# Patient Record
Sex: MALE | Race: WHITE | NOT HISPANIC OR LATINO | URBAN - METROPOLITAN AREA
[De-identification: names, ages, dates, MRNs, and addresses within clinical notes are randomized per-mention and may not be internally consistent; named-entity substitution may affect disease eponyms.]

---

## 2018-05-09 ENCOUNTER — OUTPATIENT (OUTPATIENT)
Dept: OUTPATIENT SERVICES | Facility: HOSPITAL | Age: 68
LOS: 1 days | End: 2018-05-09

## 2020-07-15 PROBLEM — Z00.00 ENCOUNTER FOR PREVENTIVE HEALTH EXAMINATION: Status: ACTIVE | Noted: 2020-07-15

## 2020-07-17 ENCOUNTER — APPOINTMENT (OUTPATIENT)
Dept: UROLOGY | Facility: CLINIC | Age: 70
End: 2020-07-17
Payer: MEDICARE

## 2020-07-17 VITALS
WEIGHT: 146 LBS | HEIGHT: 73 IN | TEMPERATURE: 98 F | DIASTOLIC BLOOD PRESSURE: 80 MMHG | HEART RATE: 90 BPM | RESPIRATION RATE: 17 BRPM | BODY MASS INDEX: 19.35 KG/M2 | SYSTOLIC BLOOD PRESSURE: 153 MMHG

## 2020-07-17 DIAGNOSIS — F17.200 NICOTINE DEPENDENCE, UNSPECIFIED, UNCOMPLICATED: ICD-10-CM

## 2020-07-17 LAB
BILIRUB UR QL STRIP: NORMAL
CLARITY UR: CLEAR
COLLECTION METHOD: NORMAL
GLUCOSE UR-MCNC: NORMAL
HCG UR QL: 0.2 EU/DL
HGB UR QL STRIP.AUTO: NORMAL
KETONES UR-MCNC: NORMAL
LEUKOCYTE ESTERASE UR QL STRIP: ABNORMAL
NITRITE UR QL STRIP: NORMAL
PH UR STRIP: 5
PROT UR STRIP-MCNC: NORMAL
SP GR UR STRIP: 1.02

## 2020-07-17 PROCEDURE — 81003 URINALYSIS AUTO W/O SCOPE: CPT | Mod: QW

## 2020-07-17 PROCEDURE — 99204 OFFICE O/P NEW MOD 45 MIN: CPT | Mod: 25

## 2020-07-17 PROCEDURE — 51798 US URINE CAPACITY MEASURE: CPT

## 2020-07-17 NOTE — HISTORY OF PRESENT ILLNESS
[FreeTextEntry1] : Mr. WILLARD ROSAS 70 year old  M  no PMH and no PSH. pt comes in bc of urinary freq. Urinating small amounts. After urination he feels he doesn't totally empty. This has been going on for at least a year. Stream is weak. Nocturia 2-3. Has urinary urgency. Some hesitancy. Occasional intermittency. 7/7/20 PSA 4.3. Pt has noticed some unexplained weight loss.  Denies FM of cancer. Current smoker.\par \par PVR 0\par

## 2020-07-17 NOTE — PHYSICAL EXAM
[General Appearance - Well Developed] : well developed [General Appearance - Well Nourished] : well nourished [Normal Appearance] : normal appearance [Well Groomed] : well groomed [General Appearance - In No Acute Distress] : no acute distress [Edema] : no peripheral edema [Exaggerated Use Of Accessory Muscles For Inspiration] : no accessory muscle use [Respiration, Rhythm And Depth] : normal respiratory rhythm and effort [Abdomen Soft] : soft [Abdomen Tenderness] : non-tender [Penis Abnormality] : normal circumcised penis [Urethral Meatus] : meatus normal [Costovertebral Angle Tenderness] : no ~M costovertebral angle tenderness [Scrotum] : the scrotum was normal [Testes Mass (___cm)] : there were no testicular masses [Urinary Bladder Findings] : the bladder was normal on palpation [Prostate Tenderness] : the prostate was not tender [Prostate Size ___ gm] : prostate size [unfilled] gm [No Prostate Nodules] : no prostate nodules [Normal Station and Gait] : the gait and station were normal for the patient's age [No Focal Deficits] : no focal deficits [] : no rash [Affect] : the affect was normal [Mood] : the mood was normal [Oriented To Time, Place, And Person] : oriented to person, place, and time [Not Anxious] : not anxious

## 2020-07-17 NOTE — REVIEW OF SYSTEMS
[Feeling Poorly] : feeling poorly [Feeling Tired] : feeling tired [Eyesight Problems] : eyesight problems [Pain during urination] : pain during urination [Constipation] : constipation [Urine Infection (bladder/kidney)] : bladder/kidney infection [Slow urine stream] : slow urine stream [Interrupted urine stream] : interrupted urine stream [Wake up at night to urinate  How many times?  ___] : wakes up to urinate [unfilled] times during the night [Negative] : Heme/Lymph [see HPI] : see HPI

## 2020-07-20 LAB
APPEARANCE: CLEAR
BACTERIA UR CULT: NORMAL
BACTERIA: NEGATIVE
BILIRUBIN URINE: NEGATIVE
BLOOD URINE: NEGATIVE
COLOR: YELLOW
GLUCOSE QUALITATIVE U: NEGATIVE
HYALINE CASTS: 0 /LPF
KETONES URINE: NEGATIVE
LEUKOCYTE ESTERASE URINE: NEGATIVE
MICROSCOPIC-UA: NORMAL
NITRITE URINE: NEGATIVE
PH URINE: 6
PROTEIN URINE: NORMAL
RED BLOOD CELLS URINE: 4 /HPF
SPECIFIC GRAVITY URINE: 1.03
SQUAMOUS EPITHELIAL CELLS: 0 /HPF
UROBILINOGEN URINE: NORMAL
WHITE BLOOD CELLS URINE: 3 /HPF

## 2020-08-14 ENCOUNTER — APPOINTMENT (OUTPATIENT)
Dept: UROLOGY | Facility: CLINIC | Age: 70
End: 2020-08-14
Payer: MEDICARE

## 2020-08-14 VITALS
HEIGHT: 73 IN | WEIGHT: 146 LBS | SYSTOLIC BLOOD PRESSURE: 131 MMHG | HEART RATE: 98 BPM | TEMPERATURE: 97.3 F | DIASTOLIC BLOOD PRESSURE: 69 MMHG | BODY MASS INDEX: 19.35 KG/M2

## 2020-08-14 PROCEDURE — 99214 OFFICE O/P EST MOD 30 MIN: CPT

## 2020-08-14 NOTE — ASSESSMENT
[FreeTextEntry1] : Plan\par prostate biopsy with Dr. Urias\par BMP\par CT urogram\par cystoscopy\par c/w flomax

## 2020-08-14 NOTE — PHYSICAL EXAM
[General Appearance - Well Developed] : well developed [General Appearance - Well Nourished] : well nourished [Normal Appearance] : normal appearance [Well Groomed] : well groomed [General Appearance - In No Acute Distress] : no acute distress [Abdomen Tenderness] : non-tender [Abdomen Soft] : soft [Costovertebral Angle Tenderness] : no ~M costovertebral angle tenderness [Urinary Bladder Findings] : the bladder was normal on palpation [Edema] : no peripheral edema [] : no respiratory distress [Respiration, Rhythm And Depth] : normal respiratory rhythm and effort [Affect] : the affect was normal [Mood] : the mood was normal [Exaggerated Use Of Accessory Muscles For Inspiration] : no accessory muscle use [Oriented To Time, Place, And Person] : oriented to person, place, and time [Not Anxious] : not anxious [Normal Station and Gait] : the gait and station were normal for the patient's age [No Focal Deficits] : no focal deficits

## 2020-08-19 ENCOUNTER — NON-APPOINTMENT (OUTPATIENT)
Age: 70
End: 2020-08-19

## 2020-08-24 ENCOUNTER — APPOINTMENT (OUTPATIENT)
Dept: CT IMAGING | Facility: CLINIC | Age: 70
End: 2020-08-24
Payer: MEDICARE

## 2020-08-24 ENCOUNTER — OUTPATIENT (OUTPATIENT)
Dept: OUTPATIENT SERVICES | Facility: HOSPITAL | Age: 70
LOS: 1 days | End: 2020-08-24
Payer: MEDICARE

## 2020-08-24 ENCOUNTER — RESULT REVIEW (OUTPATIENT)
Age: 70
End: 2020-08-24

## 2020-08-24 DIAGNOSIS — R31.29 OTHER MICROSCOPIC HEMATURIA: ICD-10-CM

## 2020-08-24 PROCEDURE — 74178 CT ABD&PLV WO CNTR FLWD CNTR: CPT

## 2020-08-24 PROCEDURE — 74178 CT ABD&PLV WO CNTR FLWD CNTR: CPT | Mod: 26

## 2020-08-26 ENCOUNTER — APPOINTMENT (OUTPATIENT)
Dept: UROLOGY | Facility: CLINIC | Age: 70
End: 2020-08-26

## 2020-09-03 ENCOUNTER — RX RENEWAL (OUTPATIENT)
Age: 70
End: 2020-09-03

## 2020-09-04 LAB
ANION GAP SERPL CALC-SCNC: 14 MMOL/L
BUN SERPL-MCNC: 22 MG/DL
CALCIUM SERPL-MCNC: 9.5 MG/DL
CHLORIDE SERPL-SCNC: 103 MMOL/L
CO2 SERPL-SCNC: 24 MMOL/L
CREAT SERPL-MCNC: 1.03 MG/DL
GLUCOSE SERPL-MCNC: 97 MG/DL
POTASSIUM SERPL-SCNC: 4.5 MMOL/L
SODIUM SERPL-SCNC: 141 MMOL/L

## 2020-09-15 ENCOUNTER — TRANSCRIPTION ENCOUNTER (OUTPATIENT)
Age: 70
End: 2020-09-15

## 2020-09-16 ENCOUNTER — APPOINTMENT (OUTPATIENT)
Dept: UROLOGY | Facility: CLINIC | Age: 70
End: 2020-09-16
Payer: MEDICARE

## 2020-09-16 VITALS
BODY MASS INDEX: 19.88 KG/M2 | DIASTOLIC BLOOD PRESSURE: 81 MMHG | HEIGHT: 73 IN | TEMPERATURE: 96.7 F | SYSTOLIC BLOOD PRESSURE: 150 MMHG | WEIGHT: 150 LBS | HEART RATE: 101 BPM

## 2020-09-16 LAB
4K SCORE CALCULATION: 6 %
FREE PSA: 0.79 NG/ML
PERCENT FREE PSA: 23 %
TOTAL PSA: 3.49 NG/ML

## 2020-09-16 PROCEDURE — 99214 OFFICE O/P EST MOD 30 MIN: CPT

## 2020-09-16 NOTE — HISTORY OF PRESENT ILLNESS
[FreeTextEntry1] : Pt comes in dollow up elevated PSA. 4K blood test on 9/3/20 shows a PSA of 3.49 and a 4K score calculation 6%. Pt currently on flomax and is doing much better with his urination. He is currently happy with his urination. CT shows a left anterior upper pole renal cyst, left parapelvic cyst. Infiltration of the right perinephric fat unchanged from prior exam. Bladder shows mild circumferential thickening of the bladder wall with more prominent focal thickening along the posterior left bladder wall. Enlarged prostate with focal cystic changes anteriorly. Prostate protrudes into the base of the bladder

## 2020-09-16 NOTE — PHYSICAL EXAM
[General Appearance - Well Developed] : well developed [Normal Appearance] : normal appearance [General Appearance - Well Nourished] : well nourished [Well Groomed] : well groomed [General Appearance - In No Acute Distress] : no acute distress [Abdomen Soft] : soft [Costovertebral Angle Tenderness] : no ~M costovertebral angle tenderness [Abdomen Tenderness] : non-tender [Urinary Bladder Findings] : the bladder was normal on palpation [Edema] : no peripheral edema [] : no respiratory distress [Respiration, Rhythm And Depth] : normal respiratory rhythm and effort [Exaggerated Use Of Accessory Muscles For Inspiration] : no accessory muscle use [Oriented To Time, Place, And Person] : oriented to person, place, and time [Mood] : the mood was normal [Affect] : the affect was normal [No Focal Deficits] : no focal deficits [Normal Station and Gait] : the gait and station were normal for the patient's age [Not Anxious] : not anxious

## 2020-10-08 ENCOUNTER — APPOINTMENT (OUTPATIENT)
Dept: UROLOGY | Facility: CLINIC | Age: 70
End: 2020-10-08
Payer: MEDICARE

## 2020-10-08 VITALS
WEIGHT: 150 LBS | BODY MASS INDEX: 19.88 KG/M2 | DIASTOLIC BLOOD PRESSURE: 93 MMHG | SYSTOLIC BLOOD PRESSURE: 165 MMHG | HEIGHT: 73 IN | HEART RATE: 90 BPM | TEMPERATURE: 97.8 F

## 2020-10-08 LAB
BILIRUB UR QL STRIP: NEGATIVE
CLARITY UR: CLEAR
COLLECTION METHOD: NORMAL
GLUCOSE UR-MCNC: NEGATIVE
HCG UR QL: 1 EU/DL
HGB UR QL STRIP.AUTO: NEGATIVE
KETONES UR-MCNC: NEGATIVE
LEUKOCYTE ESTERASE UR QL STRIP: NEGATIVE
NITRITE UR QL STRIP: NEGATIVE
PH UR STRIP: 7.5
PROT UR STRIP-MCNC: NEGATIVE
SP GR UR STRIP: 1.02

## 2020-10-08 PROCEDURE — 81003 URINALYSIS AUTO W/O SCOPE: CPT | Mod: QW

## 2020-10-08 PROCEDURE — 52000 CYSTOURETHROSCOPY: CPT

## 2021-01-07 ENCOUNTER — APPOINTMENT (OUTPATIENT)
Dept: UROLOGY | Facility: CLINIC | Age: 71
End: 2021-01-07
Payer: MEDICARE

## 2021-01-07 VITALS
DIASTOLIC BLOOD PRESSURE: 82 MMHG | HEART RATE: 96 BPM | SYSTOLIC BLOOD PRESSURE: 158 MMHG | BODY MASS INDEX: 19.88 KG/M2 | TEMPERATURE: 97.6 F | WEIGHT: 150 LBS | HEIGHT: 73 IN

## 2021-01-07 PROCEDURE — 99072 ADDL SUPL MATRL&STAF TM PHE: CPT

## 2021-01-07 PROCEDURE — 99214 OFFICE O/P EST MOD 30 MIN: CPT

## 2021-01-07 NOTE — ASSESSMENT
[FreeTextEntry1] : Plan\par PSA in April\par fu 3 months\par c/w finasteride and flomax\par urine cytology

## 2021-01-08 LAB — URINE CYTOLOGY: NORMAL

## 2021-04-08 ENCOUNTER — APPOINTMENT (OUTPATIENT)
Dept: UROLOGY | Facility: CLINIC | Age: 71
End: 2021-04-08
Payer: MEDICARE

## 2021-04-08 VITALS
TEMPERATURE: 97.8 F | BODY MASS INDEX: 19.88 KG/M2 | SYSTOLIC BLOOD PRESSURE: 138 MMHG | HEART RATE: 103 BPM | DIASTOLIC BLOOD PRESSURE: 72 MMHG | HEIGHT: 73 IN | WEIGHT: 150 LBS

## 2021-04-08 PROCEDURE — 99072 ADDL SUPL MATRL&STAF TM PHE: CPT

## 2021-04-08 PROCEDURE — 99213 OFFICE O/P EST LOW 20 MIN: CPT

## 2021-04-08 NOTE — HISTORY OF PRESENT ILLNESS
[FreeTextEntry1] : Pt comes in follow up hematuria and elevated PSA. Pt did not get PSA done yet. Pt denies any urinary complaints or hematuria.

## 2021-04-15 ENCOUNTER — APPOINTMENT (OUTPATIENT)
Dept: UROLOGY | Facility: CLINIC | Age: 71
End: 2021-04-15
Payer: MEDICARE

## 2021-04-15 VITALS
BODY MASS INDEX: 19.88 KG/M2 | DIASTOLIC BLOOD PRESSURE: 83 MMHG | HEIGHT: 73 IN | HEART RATE: 101 BPM | SYSTOLIC BLOOD PRESSURE: 145 MMHG | TEMPERATURE: 97.3 F | WEIGHT: 150 LBS

## 2021-04-15 PROCEDURE — 99072 ADDL SUPL MATRL&STAF TM PHE: CPT

## 2021-04-15 PROCEDURE — 99214 OFFICE O/P EST MOD 30 MIN: CPT

## 2021-04-15 NOTE — HISTORY OF PRESENT ILLNESS
[FreeTextEntry1] : Pt comes in follow up elevated PSA. Pt is currently on flomax and finasteride. \par \par 8/7/20 PSA 4.6 % free 24\par 9/16/20 PSA 3.49\par 4/12/21 PSA 1.7 on finasteride

## 2021-09-27 ENCOUNTER — RX RENEWAL (OUTPATIENT)
Age: 71
End: 2021-09-27

## 2021-10-14 ENCOUNTER — APPOINTMENT (OUTPATIENT)
Dept: UROLOGY | Facility: CLINIC | Age: 71
End: 2021-10-14
Payer: MEDICARE

## 2021-10-14 VITALS
WEIGHT: 150 LBS | HEART RATE: 108 BPM | HEIGHT: 73 IN | SYSTOLIC BLOOD PRESSURE: 126 MMHG | TEMPERATURE: 97.9 F | BODY MASS INDEX: 19.88 KG/M2 | DIASTOLIC BLOOD PRESSURE: 81 MMHG

## 2021-10-14 PROCEDURE — 99214 OFFICE O/P EST MOD 30 MIN: CPT

## 2021-10-14 RX ORDER — FINASTERIDE 5 MG/1
5 TABLET, FILM COATED ORAL
Qty: 90 | Refills: 3 | Status: ACTIVE | COMMUNITY
Start: 2020-10-08 | End: 1900-01-01

## 2021-10-14 NOTE — HISTORY OF PRESENT ILLNESS
[FreeTextEntry1] : Pt comes in follow up elevated PSA. Pt is currently on flomax and finasteride. \par \par 8/7/20 PSA 4.6 % free 24\par 9/16/20 PSA 3.49\par 4/12/21 PSA 1.7 on finasteride \par 10/8/21 PSA 9.3 %free 17\par

## 2021-10-14 NOTE — ASSESSMENT
[FreeTextEntry1] : Plan\par UA\par culture\par c/w flomax and finasteride\par repeat PSA\par fu 1 month\par

## 2021-10-18 LAB
APPEARANCE: CLEAR
BACTERIA UR CULT: NORMAL
BACTERIA: NEGATIVE
BILIRUBIN URINE: NEGATIVE
BLOOD URINE: NEGATIVE
COLOR: YELLOW
GLUCOSE QUALITATIVE U: NEGATIVE
HYALINE CASTS: 0 /LPF
KETONES URINE: NEGATIVE
LEUKOCYTE ESTERASE URINE: NEGATIVE
MICROSCOPIC-UA: NORMAL
NITRITE URINE: NEGATIVE
PH URINE: 6
PROTEIN URINE: NORMAL
RED BLOOD CELLS URINE: 2 /HPF
SPECIFIC GRAVITY URINE: 1.02
SQUAMOUS EPITHELIAL CELLS: 1 /HPF
UROBILINOGEN URINE: NORMAL
WHITE BLOOD CELLS URINE: 1 /HPF

## 2021-11-16 ENCOUNTER — APPOINTMENT (OUTPATIENT)
Dept: UROLOGY | Facility: CLINIC | Age: 71
End: 2021-11-16
Payer: MEDICARE

## 2021-11-16 VITALS
HEIGHT: 73 IN | HEART RATE: 93 BPM | BODY MASS INDEX: 18.95 KG/M2 | TEMPERATURE: 97.7 F | SYSTOLIC BLOOD PRESSURE: 144 MMHG | WEIGHT: 143 LBS | DIASTOLIC BLOOD PRESSURE: 75 MMHG

## 2021-11-16 PROCEDURE — 99214 OFFICE O/P EST MOD 30 MIN: CPT

## 2021-11-16 NOTE — HISTORY OF PRESENT ILLNESS
[FreeTextEntry1] : Pt comes in follow up elevated PSA. Pt is currently on flomax and finasteride. \par \par 8/7/20 PSA 4.6 % free 24\par 9/16/20 PSA 3.49\par 4/12/21 PSA 1.7 on finasteride \par 10/8/21 PSA 9.3 %free 17 on finasteride\par 11/9/21 PSA 4.2 %free 21 on finasteride\par

## 2021-11-30 ENCOUNTER — APPOINTMENT (OUTPATIENT)
Dept: MRI IMAGING | Facility: CLINIC | Age: 71
End: 2021-11-30

## 2021-12-09 ENCOUNTER — APPOINTMENT (OUTPATIENT)
Dept: MRI IMAGING | Facility: CLINIC | Age: 71
End: 2021-12-09
Payer: MEDICARE

## 2021-12-09 ENCOUNTER — OUTPATIENT (OUTPATIENT)
Dept: OUTPATIENT SERVICES | Facility: HOSPITAL | Age: 71
LOS: 1 days | End: 2021-12-09
Payer: MEDICARE

## 2021-12-09 ENCOUNTER — RESULT REVIEW (OUTPATIENT)
Age: 71
End: 2021-12-09

## 2021-12-09 DIAGNOSIS — R97.20 ELEVATED PROSTATE SPECIFIC ANTIGEN [PSA]: ICD-10-CM

## 2021-12-09 PROCEDURE — 72197 MRI PELVIS W/O & W/DYE: CPT | Mod: 26

## 2021-12-09 PROCEDURE — 76377 3D RENDER W/INTRP POSTPROCES: CPT

## 2021-12-09 PROCEDURE — 76377 3D RENDER W/INTRP POSTPROCES: CPT | Mod: 26

## 2021-12-09 PROCEDURE — 72197 MRI PELVIS W/O & W/DYE: CPT

## 2021-12-09 PROCEDURE — A9585: CPT

## 2021-12-14 ENCOUNTER — APPOINTMENT (OUTPATIENT)
Dept: UROLOGY | Facility: CLINIC | Age: 71
End: 2021-12-14
Payer: MEDICARE

## 2021-12-14 VITALS
HEART RATE: 92 BPM | HEIGHT: 73 IN | DIASTOLIC BLOOD PRESSURE: 80 MMHG | WEIGHT: 143 LBS | BODY MASS INDEX: 18.95 KG/M2 | TEMPERATURE: 97.8 F | SYSTOLIC BLOOD PRESSURE: 187 MMHG

## 2021-12-14 PROCEDURE — 99214 OFFICE O/P EST MOD 30 MIN: CPT

## 2021-12-14 NOTE — HISTORY OF PRESENT ILLNESS
[FreeTextEntry1] : Pt comes in follow up elevated PSA. Pt is currently on flomax and finasteride. \par \par 8/7/20 PSA 4.6 % free 24\par 9/16/20 PSA 3.49\par 4/12/21 PSA 1.7 on finasteride \par 10/8/21 PSA 9.3 %free 17 on finasteride\par 11/9/21 PSA 4.2 %free 21 on finasteride\par 12/9/21 MRI shows 1 PIRADS 5 lesion\par

## 2021-12-22 ENCOUNTER — APPOINTMENT (OUTPATIENT)
Dept: UROLOGY | Facility: CLINIC | Age: 71
End: 2021-12-22
Payer: MEDICARE

## 2021-12-22 VITALS
DIASTOLIC BLOOD PRESSURE: 77 MMHG | OXYGEN SATURATION: 98 % | WEIGHT: 145 LBS | TEMPERATURE: 96.8 F | BODY MASS INDEX: 19.22 KG/M2 | SYSTOLIC BLOOD PRESSURE: 157 MMHG | HEIGHT: 73 IN | HEART RATE: 85 BPM

## 2021-12-22 PROCEDURE — 99214 OFFICE O/P EST MOD 30 MIN: CPT

## 2021-12-22 NOTE — HISTORY OF PRESENT ILLNESS
[FreeTextEntry1] : Pt comes in follow up elevated PSA. Pt is currently on flomax and finasteride. \par 8/7/20 PSA 4.6 % free 24\par 9/16/20 PSA 3.49\par 4/12/21 PSA 1.7 on finasteride \par 10/8/21 PSA 9.3 %free 17 on finasteride\par 11/9/21 PSA 4.2 %free 21 on finasteride\par \par \par 12/9/21 MRI shows 1 PIRADS 5 lesion\par Midline, posterior medial-lateral (PZpm-pl), base-to-apex, peripheral \par zone lesion. There is bilateral neurovascular bundle \par involvement and bilateral seminal vesicle invasion. Long segment contact \par with the anterior rectal wall suspicious for invasion.\par \par Heavy smoker\par PMHx: negative\par Prost Volume = 37 cc / PSA density = 0.24  >>> 0.15\par Fam Hx: neg for PCa, breast cancer.

## 2021-12-22 NOTE — ASSESSMENT
[FreeTextEntry1] : 71 year old male with elevated PSA of: 4.2 on Finasteride (8.8) with a PSA density = 0.24  >> 0.15, with a low%free fraction and an MRI with 1 PIRADS 5 lesion invading bilateral NVB's, both SV's, and abutting the anterior rectal wall. cT3b / cT4\par \par PEPE:\par No Family History of prostate cancer. \par \par Plan:  \par -Schedule Transperineal FUSION Prostate Biopsy  \par NOTE: patient has extreme back pain , so we need to be careful in positioning for biopsy. SEDATION needed (do at [facility])\par \par Precision Trial: 500 men w/ elevated PSA underwent randomization to either MRI +/- MRI guided Bx if suspicion on MRI vs Standard TRUS 12 core Bx-Primary outcome was detection of clinically significant Cancer. Results-71/252 men in MRI group did not undergo biopsy bcs MRI not suggestive; 95/252 who underwent biopsy had clinically significant cancer detected (38%) copared with just 26% in TRUS --> Take home message: the use of MRI and MRI Bx is superior to TRUS in men who have not undergone previous bx because you had higher detection of clinically significant cancer with MRI and less detection and diagnosis of clinically insignificant PCa with MRI, thus saving patients an unnecessary Bx.\par \par I had a discussion with the patient regarding the implication of an elevated PSA and the need for further evaluation with a multiparametric MRI of the prostate with 3D mapping to facilitate subsequent fusion biopsy.  \par \par If suspicious areas are noted, the patient will need a Uronav (meaning fusion MRI-US biopsies) to biopsy the prostate using the transperineal approach. \par \par The potential side effects including bleeding and infection were also detailed. Additionally, we discussed the potential implication of a positive biopsy for prostate cancer, and depending on the grade and stage of the cancer the need for treatment including, active surveillance, radiation, radiation seed implants and surgery.  \par \par The patient has agreed to proceed with prostate biopsy. He will stop all aspirin and aspirin like products 10 days prior to the biopsy. There is no need for pre-procedural antibiotics, and he will self-administer a cleansing Fleet's enema just prior to the biopsy.  \par \par \par

## 2021-12-22 NOTE — PHYSICAL EXAM
[General Appearance - Well Developed] : well developed [Normal Appearance] : normal appearance [General Appearance - Well Nourished] : well nourished [Well Groomed] : well groomed [General Appearance - In No Acute Distress] : no acute distress [Abdomen Soft] : soft [Abdomen Tenderness] : non-tender [Costovertebral Angle Tenderness] : no ~M costovertebral angle tenderness [Urinary Bladder Findings] : the bladder was normal on palpation [Edema] : no peripheral edema [] : no respiratory distress [Respiration, Rhythm And Depth] : normal respiratory rhythm and effort [Exaggerated Use Of Accessory Muscles For Inspiration] : no accessory muscle use [Oriented To Time, Place, And Person] : oriented to person, place, and time [Affect] : the affect was normal [Mood] : the mood was normal [Not Anxious] : not anxious [Normal Station and Gait] : the gait and station were normal for the patient's age [No Focal Deficits] : no focal deficits [FreeTextEntry1] : PEPE:

## 2021-12-28 ENCOUNTER — NON-APPOINTMENT (OUTPATIENT)
Age: 71
End: 2021-12-28

## 2021-12-29 ENCOUNTER — NON-APPOINTMENT (OUTPATIENT)
Age: 71
End: 2021-12-29

## 2022-01-03 ENCOUNTER — APPOINTMENT (OUTPATIENT)
Dept: UROLOGY | Facility: HOSPITAL | Age: 72
End: 2022-01-03

## 2022-01-27 ENCOUNTER — OUTPATIENT (OUTPATIENT)
Dept: OUTPATIENT SERVICES | Facility: HOSPITAL | Age: 72
LOS: 1 days | End: 2022-01-27
Payer: MEDICARE

## 2022-01-27 PROCEDURE — 93010 ELECTROCARDIOGRAM REPORT: CPT

## 2022-01-31 ENCOUNTER — APPOINTMENT (OUTPATIENT)
Dept: CARDIOLOGY | Facility: CLINIC | Age: 72
End: 2022-01-31
Payer: COMMERCIAL

## 2022-01-31 ENCOUNTER — NON-APPOINTMENT (OUTPATIENT)
Age: 72
End: 2022-01-31

## 2022-01-31 VITALS
HEART RATE: 94 BPM | OXYGEN SATURATION: 99 % | SYSTOLIC BLOOD PRESSURE: 116 MMHG | TEMPERATURE: 98.1 F | DIASTOLIC BLOOD PRESSURE: 80 MMHG | BODY MASS INDEX: 18.21 KG/M2 | WEIGHT: 138 LBS

## 2022-01-31 VITALS — SYSTOLIC BLOOD PRESSURE: 124 MMHG | DIASTOLIC BLOOD PRESSURE: 78 MMHG

## 2022-01-31 DIAGNOSIS — R94.31 ABNORMAL ELECTROCARDIOGRAM [ECG] [EKG]: ICD-10-CM

## 2022-01-31 DIAGNOSIS — Z83.438 FAMILY HISTORY OF OTHER DISORDER OF LIPOPROTEIN METABOLISM AND OTHER LIPIDEMIA: ICD-10-CM

## 2022-01-31 DIAGNOSIS — R06.02 SHORTNESS OF BREATH: ICD-10-CM

## 2022-01-31 DIAGNOSIS — Z87.898 PERSONAL HISTORY OF OTHER SPECIFIED CONDITIONS: ICD-10-CM

## 2022-01-31 DIAGNOSIS — N40.0 BENIGN PROSTATIC HYPERPLASIA WITHOUT LOWER URINARY TRACT SYMPMS: ICD-10-CM

## 2022-01-31 DIAGNOSIS — F17.200 NICOTINE DEPENDENCE, UNSPECIFIED, UNCOMPLICATED: ICD-10-CM

## 2022-01-31 DIAGNOSIS — R09.89 OTHER SPECIFIED SYMPTOMS AND SIGNS INVOLVING THE CIRCULATORY AND RESPIRATORY SYSTEMS: ICD-10-CM

## 2022-01-31 DIAGNOSIS — Z78.9 OTHER SPECIFIED HEALTH STATUS: ICD-10-CM

## 2022-01-31 DIAGNOSIS — R39.9 UNSPECIFIED SYMPTOMS AND SIGNS INVOLVING THE GENITOURINARY SYSTEM: ICD-10-CM

## 2022-01-31 DIAGNOSIS — I70.90 UNSPECIFIED ATHEROSCLEROSIS: ICD-10-CM

## 2022-01-31 DIAGNOSIS — Z72.3 LACK OF PHYSICAL EXERCISE: ICD-10-CM

## 2022-01-31 DIAGNOSIS — R97.20 BENIGN PROSTATIC HYPERPLASIA WITHOUT LOWER URINARY TRACT SYMPMS: ICD-10-CM

## 2022-01-31 DIAGNOSIS — I49.9 CARDIAC ARRHYTHMIA, UNSPECIFIED: ICD-10-CM

## 2022-01-31 DIAGNOSIS — R31.29 OTHER MICROSCOPIC HEMATURIA: ICD-10-CM

## 2022-01-31 DIAGNOSIS — N28.1 CYST OF KIDNEY, ACQUIRED: ICD-10-CM

## 2022-01-31 DIAGNOSIS — N32.89 OTHER SPECIFIED DISORDERS OF BLADDER: ICD-10-CM

## 2022-01-31 PROCEDURE — 99205 OFFICE O/P NEW HI 60 MIN: CPT | Mod: 25

## 2022-01-31 PROCEDURE — 93306 TTE W/DOPPLER COMPLETE: CPT

## 2022-01-31 NOTE — ASSESSMENT
[FreeTextEntry1] : Abnormal EKG with right bundle branch and left anterior hemiblock -he has history of smoking; shortness of breath on more than usual exertion; he needs noninvasive CAD work-up.  Echocardiogram today did not show any LV wall motion and has normal LVEF.  He needs Lexiscan marker perfusion scan to be scheduled at some point in future.\par \par Arrhythmias -in the form of frequent PVCs; Holter monitor to quantify PVCs; avoid caffeine products.\par \par Atherosclerosis -I recommended carotid Doppler for evaluation of carotid stenosis, I also recommended abdominal ultrasound for aortic dimension, FAUSTO for peripheral artery disease.\par \par Cachexia and weight loss -has normal colonoscopy; I recommended evaluation by Dr. Chris Quiles; in the meantime I will refer him for x-ray of the chest and CT scan without contrast.\par \par Nicotine dependency -smoking associated hazards of variety of cancers, pulmonary/vascular complication has been discussed with him at great length; importance of stopping smoking has been discussed with him.\par \par Preop for prostate biopsy -clinically he has no evidence of ACS, CHF, arrhythmias; overall cardiac wise patient maximized for the planned procedure.  Proceed without delay.  Perioperative close monitoring of vitals.  Please call me if I can assist you further.  \par \par Risk factor monitoring has been discussed with him at great length including regular walking, compliance to medications, low-cholesterol diet, smoking cessation.  He will be be evaluated by me after cardiac testing.

## 2022-01-31 NOTE — HISTORY OF PRESENT ILLNESS
[FreeTextEntry1] : 71 years old gentleman has been diagnosed of elevated PSA with BPH and has been planned for prostate biopsy.  Preop EKG showed right bundle branch block along with left and hemiblock and a unifocal PVCs he has been referred for cardiac evaluation.\par \par Patient does not exercise per se but gets short of breath very easily.  He denies any chest pain, PND, orthopnea, diaphoresis, dizziness, palpitations.\par \par He has been losing weight since last years old, he lost more than 30 pounds; not seen his PMD for more than a year and half; colonoscopy was normal as per patient.\par \par No past history of diabetes, hypertension, dyslipidemia, CHF, MI, syncope.  He smokes about half to 1 pack/day.

## 2022-02-01 ENCOUNTER — OUTPATIENT (OUTPATIENT)
Dept: OUTPATIENT SERVICES | Facility: HOSPITAL | Age: 72
LOS: 1 days | End: 2022-02-01
Payer: MEDICARE

## 2022-02-01 ENCOUNTER — APPOINTMENT (OUTPATIENT)
Dept: UROLOGY | Facility: HOSPITAL | Age: 72
End: 2022-02-01

## 2022-02-01 PROCEDURE — 76942 ECHO GUIDE FOR BIOPSY: CPT | Mod: 26,59

## 2022-02-01 PROCEDURE — 55700: CPT | Mod: 22

## 2022-02-01 PROCEDURE — 76377 3D RENDER W/INTRP POSTPROCES: CPT | Mod: 26

## 2022-02-01 PROCEDURE — 76872 US TRANSRECTAL: CPT | Mod: 26

## 2022-02-02 ENCOUNTER — NON-APPOINTMENT (OUTPATIENT)
Age: 72
End: 2022-02-02

## 2022-02-03 DIAGNOSIS — Z01.810 ENCOUNTER FOR PREPROCEDURAL CARDIOVASCULAR EXAMINATION: ICD-10-CM

## 2022-02-03 DIAGNOSIS — R97.20 ELEVATED PROSTATE SPECIFIC ANTIGEN [PSA]: ICD-10-CM

## 2022-02-03 DIAGNOSIS — Z01.812 ENCOUNTER FOR PREPROCEDURAL LABORATORY EXAMINATION: ICD-10-CM

## 2022-02-07 DIAGNOSIS — R97.20 ELEVATED PROSTATE SPECIFIC ANTIGEN [PSA]: ICD-10-CM

## 2022-02-14 ENCOUNTER — APPOINTMENT (OUTPATIENT)
Dept: UROLOGY | Facility: CLINIC | Age: 72
End: 2022-02-14
Payer: COMMERCIAL

## 2022-02-14 VITALS
TEMPERATURE: 91.4 F | OXYGEN SATURATION: 98 % | BODY MASS INDEX: 18.55 KG/M2 | HEART RATE: 98 BPM | HEIGHT: 73 IN | SYSTOLIC BLOOD PRESSURE: 171 MMHG | DIASTOLIC BLOOD PRESSURE: 83 MMHG | WEIGHT: 140 LBS

## 2022-02-14 PROCEDURE — 99215 OFFICE O/P EST HI 40 MIN: CPT

## 2022-02-14 NOTE — ASSESSMENT
[FreeTextEntry1] : 71 year old male with elevated PSA of: 4.2 on Finasteride (8.8) with a PSA density = 0.24  >> 0.15, with a low%free fraction and an MRI with 1 PIRADS 5 lesion invading bilateral NVB's, both SV's, and abutting the anterior rectal wall. cT3b / cT4\par PEPE: extremely localized\par No Family History of prostate cancer. \par FU Feb 14 2022: 13/13 cores all positive for high risk PCa: gl 8 and gl 9 PCa\par \par Plan:  Very high risk locally advanced Pca - Gl 9 invading bilateral sems, NVB's\par Bone scan to r/o bone mets\par Refer to Rad Onc / Med Onc / Dr Edwards and Dr. Preston Alvarado Rad Onc\par \par I had a very lengthy and thorough discussion with Mr. Portillo regarding the characteristics of his cancer and the risks, benefits, rationale, implications and alternatives of the various management options. I discouraged him from considering active surveillance because of high risk disease. I also discouraged surgery as he has locally advanced disease with possible invasion to the rectum. Surgery can lead to unfavorable complications including rectal injury, in addition to positive oncologic outcomes and unfavorable outcomes.\par \par We spent the bulk of our time discussing two major options: Primary androgen deprivation therapy PLUS External beam radiation therapy: ADT is associated with multiple side effects, such as hot flashes, osteoporosis, decreased libido, erectile dysfunction, and a potentially higher risk of diabetes and heart disease.\par \par We also discussed the rationale of radiation therapy, specifically external beam radiation therapy. There is an excellent track record of success but can be associated with potential side effects which include urinary urgency, frequency, urethral stricture as well as the potential for erectile dysfunction and rectal irritation or frequency of bowel movements. I explained there is an approximately 2% chance of serious bladder or rectal toxicity as well as a very low risk of inducing a secondary malignancy. The potential downside of radiation is the lack of complete pathologic review and lack of reliable salvage curative options. \par \par At the completion of our discussion, multiple questions were answered to the best of my ability. He appears to be very well informed about his cancer as well as the options. I explained to him there is no rush in making a decision. \par \par \par

## 2022-02-14 NOTE — HISTORY OF PRESENT ILLNESS
[FreeTextEntry1] : Pt comes in follow up elevated PSA. Pt is currently on flomax and finasteride. \par 8/7/20 PSA 4.6 % free 24\par 10/8/21 PSA 9.3 %free 17 on finasteride\par 11/9/21 PSA 4.2 %free 21 on finasteride\par \par 12/9/21 MRI shows 1 PIRADS 5 lesion --  Midline, posterior medial-lateral (PZpm-pl), base-to-apex, peripheral \par zone lesion. There is bilateral neurovascular bundle involvement and bilateral seminal vesicle invasion. Long segment contact with the anterior rectal wall suspicious for invasion. Neurovascular bundle: Bilateral invasion.\par Seminal vesicles:  Bilateral seminal vesicle invasion. Lymph nodes: No pelvic adenopathy. Bones: No suspicious lesions identified.\par \par Heavy smoker\par PMHx: negative\par Prost Volume = 37 cc / PSA density = 0.24  >>> 0.15\par Fam Hx: neg for PCa, breast cancer.\par \par FU Feb 14 2022: 13/13 cores all positive for high risk PCa: gl 8 and gl 9 PCa

## 2022-02-14 NOTE — PHYSICAL EXAM

## 2022-02-18 ENCOUNTER — RESULT REVIEW (OUTPATIENT)
Age: 72
End: 2022-02-18

## 2022-02-18 ENCOUNTER — APPOINTMENT (OUTPATIENT)
Dept: HEMATOLOGY ONCOLOGY | Facility: CLINIC | Age: 72
End: 2022-02-18
Payer: MEDICARE

## 2022-02-18 ENCOUNTER — OUTPATIENT (OUTPATIENT)
Dept: OUTPATIENT SERVICES | Facility: HOSPITAL | Age: 72
LOS: 1 days | End: 2022-02-18
Payer: MEDICARE

## 2022-02-18 VITALS
SYSTOLIC BLOOD PRESSURE: 162 MMHG | TEMPERATURE: 97.9 F | BODY MASS INDEX: 18.27 KG/M2 | OXYGEN SATURATION: 97 % | DIASTOLIC BLOOD PRESSURE: 91 MMHG | WEIGHT: 138.45 LBS | HEART RATE: 85 BPM

## 2022-02-18 DIAGNOSIS — C61 MALIGNANT NEOPLASM OF PROSTATE: ICD-10-CM

## 2022-02-18 LAB
ALBUMIN SERPL ELPH-MCNC: 4.4 G/DL — SIGNIFICANT CHANGE UP (ref 3.3–5)
ALP SERPL-CCNC: 149 U/L — HIGH (ref 40–120)
ALT FLD-CCNC: 23 U/L — SIGNIFICANT CHANGE UP (ref 10–45)
ANION GAP SERPL CALC-SCNC: 13 MMOL/L — SIGNIFICANT CHANGE UP (ref 5–17)
AST SERPL-CCNC: 28 U/L — SIGNIFICANT CHANGE UP (ref 10–40)
BASOPHILS # BLD AUTO: 0.04 K/UL — SIGNIFICANT CHANGE UP (ref 0–0.2)
BASOPHILS NFR BLD AUTO: 0.4 % — SIGNIFICANT CHANGE UP (ref 0–2)
BILIRUB SERPL-MCNC: 0.5 MG/DL — SIGNIFICANT CHANGE UP (ref 0.2–1.2)
BUN SERPL-MCNC: 20 MG/DL — SIGNIFICANT CHANGE UP (ref 7–23)
CALCIUM SERPL-MCNC: 9.4 MG/DL — SIGNIFICANT CHANGE UP (ref 8.4–10.5)
CHLORIDE SERPL-SCNC: 100 MMOL/L — SIGNIFICANT CHANGE UP (ref 96–108)
CO2 SERPL-SCNC: 26 MMOL/L — SIGNIFICANT CHANGE UP (ref 22–31)
CREAT SERPL-MCNC: 1.03 MG/DL — SIGNIFICANT CHANGE UP (ref 0.5–1.3)
CRP SERPL-MCNC: 4 MG/L — SIGNIFICANT CHANGE UP
EOSINOPHIL # BLD AUTO: 0.21 K/UL — SIGNIFICANT CHANGE UP (ref 0–0.5)
EOSINOPHIL NFR BLD AUTO: 2 % — SIGNIFICANT CHANGE UP (ref 0–6)
FERRITIN SERPL-MCNC: 640 NG/ML — HIGH (ref 30–400)
FOLATE SERPL-MCNC: 4.9 NG/ML — SIGNIFICANT CHANGE UP
GLUCOSE SERPL-MCNC: 95 MG/DL — SIGNIFICANT CHANGE UP (ref 70–99)
HCT VFR BLD CALC: 46.4 % — SIGNIFICANT CHANGE UP (ref 39–50)
HGB BLD-MCNC: 15.7 G/DL — SIGNIFICANT CHANGE UP (ref 13–17)
IMM GRANULOCYTES NFR BLD AUTO: 0.4 % — SIGNIFICANT CHANGE UP (ref 0–1.5)
IRON SATN MFR SERPL: 34 % — SIGNIFICANT CHANGE UP (ref 16–55)
IRON SATN MFR SERPL: 80 UG/DL — SIGNIFICANT CHANGE UP (ref 45–165)
LYMPHOCYTES # BLD AUTO: 19 % — SIGNIFICANT CHANGE UP (ref 13–44)
LYMPHOCYTES # BLD AUTO: 2.03 K/UL — SIGNIFICANT CHANGE UP (ref 1–3.3)
MCHC RBC-ENTMCNC: 31.5 PG — SIGNIFICANT CHANGE UP (ref 27–34)
MCHC RBC-ENTMCNC: 33.8 GM/DL — SIGNIFICANT CHANGE UP (ref 32–36)
MCV RBC AUTO: 93 FL — SIGNIFICANT CHANGE UP (ref 80–100)
MONOCYTES # BLD AUTO: 0.75 K/UL — SIGNIFICANT CHANGE UP (ref 0–0.9)
MONOCYTES NFR BLD AUTO: 7 % — SIGNIFICANT CHANGE UP (ref 2–14)
NEUTROPHILS # BLD AUTO: 7.64 K/UL — HIGH (ref 1.8–7.4)
NEUTROPHILS NFR BLD AUTO: 71.2 % — SIGNIFICANT CHANGE UP (ref 43–77)
NRBC # BLD: 0 /100 WBCS — SIGNIFICANT CHANGE UP (ref 0–0)
PLATELET # BLD AUTO: 283 K/UL — SIGNIFICANT CHANGE UP (ref 150–400)
POTASSIUM SERPL-MCNC: 5.1 MMOL/L — SIGNIFICANT CHANGE UP (ref 3.5–5.3)
POTASSIUM SERPL-SCNC: 5.1 MMOL/L — SIGNIFICANT CHANGE UP (ref 3.5–5.3)
PROT SERPL-MCNC: 7.3 G/DL — SIGNIFICANT CHANGE UP (ref 6–8.3)
PSA FREE FLD-MCNC: 16 % — SIGNIFICANT CHANGE UP
PSA FREE FLD-MCNC: 4.15 NG/ML — SIGNIFICANT CHANGE UP
PSA SERPL-MCNC: 25.9 NG/ML — HIGH (ref 0–4)
RBC # BLD: 4.99 M/UL — SIGNIFICANT CHANGE UP (ref 4.2–5.8)
RBC # FLD: 14.7 % — HIGH (ref 10.3–14.5)
SODIUM SERPL-SCNC: 139 MMOL/L — SIGNIFICANT CHANGE UP (ref 135–145)
TIBC SERPL-MCNC: 237 UG/DL — SIGNIFICANT CHANGE UP (ref 220–430)
TRANSFERRIN SERPL-MCNC: 183 MG/DL — LOW (ref 200–360)
UIBC SERPL-MCNC: 157 UG/DL — SIGNIFICANT CHANGE UP (ref 110–370)
VIT B12 SERPL-MCNC: 742 PG/ML — SIGNIFICANT CHANGE UP (ref 232–1245)
WBC # BLD: 10.71 K/UL — HIGH (ref 3.8–10.5)
WBC # FLD AUTO: 10.71 K/UL — HIGH (ref 3.8–10.5)

## 2022-02-18 PROCEDURE — 99205 OFFICE O/P NEW HI 60 MIN: CPT

## 2022-02-18 NOTE — RESULTS/DATA
[FreeTextEntry1] : Mr. Portillo presented at age 71 in February 2022 for evaluation of locally advanced prostate cancer. \par The patient has a medical history of active smoking. \par \par Pending bone scan and CT chest. \par Start bicalutamide. \par RT evaluation. \par Rule out metastatic disease.

## 2022-02-18 NOTE — HISTORY OF PRESENT ILLNESS
[de-identified] : Referred by: Dr. Adrian Kc\par Diagnosis: very high risk locally advanced prostate cancer \par \par Mr. Portillo presented at age 71 in February 2022 for evaluation of locally advanced prostate cancer. \par The patient has a medical history of active smoking. \par \par Sriram was sent in for evaluation for locally advanced prostate cancer after recently being seen by uro-oncologist Dr. Kc.  He initially presented to Dr. Kc with an elevated PSA. MRI pelvis on 12/9/21 revealed 1 PIRADS 5 lesion with bilateral neurovascular bundle involvement and bilateral seminal vesicle invasion, no pelvic adenopathy. He underwent prostate biopsy on 2/14/22 which revealed high risk prostate cancer in 13/13 cores, idania 8  (4+4) and 9 (4+5). He is currently taking tamsulosin and finasteride. He reports extreme urinary urgency and constipation. He is currently taking dulcolax daily but still having difficulty with bowel movements. He reports he has lost 30lb over the past 2 years 2/2 difficulty eating 2/2 constipation. His bone scan is scheduled and he is ordered for CT chest as well. He was referred to medical oncology and radiation oncology, Dr. Alvarado. \par \par Imaging: pending CT chest and bone scan\par MRI pelvis 12/9/21: 1 PIRADS 5 lesion with bilateral neurovascular bundle involvement and bilateral seminal vesicle invasion, no pelvic adenopathy.\par \par Path: \par Prostate biopsy 2/1/22: prostate adenocarcinoma, Idania 8 (4+4) and 9 (4+5) in all cores, +PNI\par \par Genetics: not done\par \par HCM: \par - COVID vaccination: s/p booster January 2022\par - Colonoscopy: done 2 years ago, normal \par - Lung cancer screen: pending CT chest high res\par \par SH: \par - Occupation: previously worked in photo finishes \par - Living situation: lives in [place], his wife passed away, he is here with his sister and HCP today (Maylin) \par - Smoking/etoh/illicits: he actively smokes 10 cigarettes / day x many years, denies etoh, denies illicits \par - Exercise: minimally active \par \par FH: \par - No family history of cancer

## 2022-02-18 NOTE — CONSULT LETTER
[Dear  ___] : Dear  [unfilled], [Consult Letter:] : I had the pleasure of evaluating your patient, [unfilled]. [Please see my note below.] : Please see my note below. [Consult Closing:] : Thank you very much for allowing me to participate in the care of this patient.  If you have any questions, please do not hesitate to contact me. [Sincerely,] : Sincerely, [FreeTextEntry2] : Dr. Adrian Kc [FreeTextEntry3] : Dr. Elizabeth Edwards\par  [DrDaniel  ___] : Dr. SANTIAGO

## 2022-02-18 NOTE — REASON FOR VISIT
[Initial Consultation] : an initial consultation [FreeTextEntry2] : Locally advanced prostate cancer

## 2022-02-18 NOTE — PHYSICAL EXAM
[Ambulatory and capable of all self care but unable to carry out any work activities] : Status 2- Ambulatory and capable of all self care but unable to carry out any work activities. Up and about more than 50% of waking hours [Normal] : affect appropriate [de-identified] : thin male, pleasant, NAD

## 2022-02-19 ENCOUNTER — NON-APPOINTMENT (OUTPATIENT)
Age: 72
End: 2022-02-19

## 2022-02-19 LAB
ERYTHROCYTE [SEDIMENTATION RATE] IN BLOOD: 28 MM/HR — HIGH (ref 0–20)
HBV CORE AB SER-ACNC: REACTIVE
HBV SURFACE AB SER-ACNC: 50.9 MIU/ML — SIGNIFICANT CHANGE UP
HBV SURFACE AB SER-ACNC: REACTIVE
HBV SURFACE AG SER-ACNC: SIGNIFICANT CHANGE UP
HCV AB S/CO SERPL IA: 5.73 S/CO — HIGH (ref 0–0.99)
HCV AB SERPL-IMP: REACTIVE
HIV 1+2 AB+HIV1 P24 AG SERPL QL IA: SIGNIFICANT CHANGE UP

## 2022-02-23 LAB — HCV RNA FLD QL NAA+PROBE: SIGNIFICANT CHANGE UP

## 2022-02-24 ENCOUNTER — OUTPATIENT (OUTPATIENT)
Dept: OUTPATIENT SERVICES | Facility: HOSPITAL | Age: 72
LOS: 1 days | End: 2022-02-24
Payer: MEDICARE

## 2022-02-24 ENCOUNTER — RESULT REVIEW (OUTPATIENT)
Age: 72
End: 2022-02-24

## 2022-02-24 ENCOUNTER — APPOINTMENT (OUTPATIENT)
Dept: CT IMAGING | Facility: CLINIC | Age: 72
End: 2022-02-24

## 2022-02-24 DIAGNOSIS — C61 MALIGNANT NEOPLASM OF PROSTATE: ICD-10-CM

## 2022-02-24 DIAGNOSIS — R93.6 ABNORMAL FINDINGS ON DIAGNOSTIC IMAGING OF LIMBS: ICD-10-CM

## 2022-02-24 DIAGNOSIS — R07.81 PLEURODYNIA: ICD-10-CM

## 2022-02-24 PROCEDURE — 78306 BONE IMAGING WHOLE BODY: CPT | Mod: 26

## 2022-02-24 PROCEDURE — 71100 X-RAY EXAM RIBS UNI 2 VIEWS: CPT | Mod: 26,RT

## 2022-02-24 PROCEDURE — 73552 X-RAY EXAM OF FEMUR 2/>: CPT | Mod: 26,LT

## 2022-03-01 ENCOUNTER — NON-APPOINTMENT (OUTPATIENT)
Age: 72
End: 2022-03-01

## 2022-03-02 ENCOUNTER — APPOINTMENT (OUTPATIENT)
Dept: UROLOGY | Facility: CLINIC | Age: 72
End: 2022-03-02
Payer: MEDICARE

## 2022-03-02 VITALS
HEART RATE: 66 BPM | BODY MASS INDEX: 18.29 KG/M2 | TEMPERATURE: 96.8 F | HEIGHT: 73 IN | OXYGEN SATURATION: 100 % | SYSTOLIC BLOOD PRESSURE: 138 MMHG | WEIGHT: 138 LBS | DIASTOLIC BLOOD PRESSURE: 78 MMHG

## 2022-03-02 PROCEDURE — 99214 OFFICE O/P EST MOD 30 MIN: CPT

## 2022-03-02 NOTE — HISTORY OF PRESENT ILLNESS
[FreeTextEntry1] : Pt comes in follow up elevated PSA. Pt is currently on flomax and finasteride. \par 8/7/20 PSA 4.6 % free 24\par 10/8/21 PSA 9.3 %free 17 on finasteride\par 11/9/21 PSA 4.2 %free 21 on finasteride\par \par 12/9/21 MRI shows 1 PIRADS 5 lesion --  Midline, posterior medial-lateral (PZpm-pl), base-to-apex, peripheral \par zone lesion. There is bilateral neurovascular bundle involvement and bilateral seminal vesicle invasion. Long segment contact with the anterior rectal wall suspicious for invasion. Neurovascular bundle: Bilateral invasion.\par Seminal vesicles:  Bilateral seminal vesicle invasion. Lymph nodes: No pelvic adenopathy. Bones: No suspicious lesions identified.\par \par Heavy smoker\par PMHx: negative\par Prost Volume = 37 cc / PSA density = 0.24  >>> 0.15\par Fam Hx: neg for PCa, breast cancer.\par \par FU Feb 14 2022: 13/13 cores all positive for high risk PCa: Gl 8 and Gl 9 PCa\par FU March 2, 2022: Saw Dr. Alvarado: could do rad tx starting april\par Bone scan:  Focal increased  LEFT femoral, RIGHT 3rd rib and sacral radiotracer accumulation(without correlative radiographic abnormalities) are most suspicious for osseous metastasis

## 2022-03-02 NOTE — ASSESSMENT
[FreeTextEntry1] : 71 year old male with elevated PSA of: 4.2 on Finasteride (8.8) with a PSA density = 0.24  >> 0.15, with a low%free fraction and an MRI with 1 PIRADS 5 lesion invading bilateral NVB's, both SV's, and abutting the anterior rectal wall. cT3b / cT4\par PEPE: extremely localized\par No Family History of prostate cancer. 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 PET PSMA scan.\par Patient has already started casodex 50 mg daily on 02/25/2022.\par Surgical orchiectomy vs. medical ADT +/- Radiation boost especially to bone lesions because patient is symptomatic.\par Regarding orchiectomy, risks and benefits of surgery were discussed in detail with the patient\par All images were reviewed and explained thoroughly\par All the patient's questions were answered to the best of my ability.\par Patient prefers medical ADT + rad tx.\par

## 2022-03-08 ENCOUNTER — RESULT REVIEW (OUTPATIENT)
Age: 72
End: 2022-03-08

## 2022-03-08 ENCOUNTER — APPOINTMENT (OUTPATIENT)
Dept: HEMATOLOGY ONCOLOGY | Facility: CLINIC | Age: 72
End: 2022-03-08
Payer: MEDICARE

## 2022-03-08 ENCOUNTER — APPOINTMENT (OUTPATIENT)
Dept: INFUSION THERAPY | Facility: CANCER CENTER | Age: 72
End: 2022-03-08

## 2022-03-08 VITALS
TEMPERATURE: 97.9 F | HEART RATE: 83 BPM | WEIGHT: 139.97 LBS | OXYGEN SATURATION: 99 % | SYSTOLIC BLOOD PRESSURE: 150 MMHG | DIASTOLIC BLOOD PRESSURE: 85 MMHG | BODY MASS INDEX: 18.47 KG/M2

## 2022-03-08 LAB
BASOPHILS # BLD AUTO: 0.04 K/UL — SIGNIFICANT CHANGE UP (ref 0–0.2)
BASOPHILS NFR BLD AUTO: 0.5 % — SIGNIFICANT CHANGE UP (ref 0–2)
EOSINOPHIL # BLD AUTO: 0.27 K/UL — SIGNIFICANT CHANGE UP (ref 0–0.5)
EOSINOPHIL NFR BLD AUTO: 3.5 % — SIGNIFICANT CHANGE UP (ref 0–6)
HCT VFR BLD CALC: 49.2 % — SIGNIFICANT CHANGE UP (ref 39–50)
HGB BLD-MCNC: 16.3 G/DL — SIGNIFICANT CHANGE UP (ref 13–17)
IMM GRANULOCYTES NFR BLD AUTO: 0.3 % — SIGNIFICANT CHANGE UP (ref 0–1.5)
LYMPHOCYTES # BLD AUTO: 1.89 K/UL — SIGNIFICANT CHANGE UP (ref 1–3.3)
LYMPHOCYTES # BLD AUTO: 24.5 % — SIGNIFICANT CHANGE UP (ref 13–44)
MCHC RBC-ENTMCNC: 31.2 PG — SIGNIFICANT CHANGE UP (ref 27–34)
MCHC RBC-ENTMCNC: 33.1 GM/DL — SIGNIFICANT CHANGE UP (ref 32–36)
MCV RBC AUTO: 94.3 FL — SIGNIFICANT CHANGE UP (ref 80–100)
MONOCYTES # BLD AUTO: 0.47 K/UL — SIGNIFICANT CHANGE UP (ref 0–0.9)
MONOCYTES NFR BLD AUTO: 6.1 % — SIGNIFICANT CHANGE UP (ref 2–14)
NEUTROPHILS # BLD AUTO: 5.01 K/UL — SIGNIFICANT CHANGE UP (ref 1.8–7.4)
NEUTROPHILS NFR BLD AUTO: 65.1 % — SIGNIFICANT CHANGE UP (ref 43–77)
NRBC # BLD: 0 /100 WBCS — SIGNIFICANT CHANGE UP (ref 0–0)
PLATELET # BLD AUTO: 144 K/UL — LOW (ref 150–400)
PSA FREE FLD-MCNC: 17 % — SIGNIFICANT CHANGE UP
PSA FREE FLD-MCNC: 2.48 NG/ML — SIGNIFICANT CHANGE UP
PSA SERPL-MCNC: 14.6 NG/ML — HIGH (ref 0–4)
RBC # BLD: 5.22 M/UL — SIGNIFICANT CHANGE UP (ref 4.2–5.8)
RBC # FLD: 15.4 % — HIGH (ref 10.3–14.5)
WBC # BLD: 7.7 K/UL — SIGNIFICANT CHANGE UP (ref 3.8–10.5)
WBC # FLD AUTO: 7.7 K/UL — SIGNIFICANT CHANGE UP (ref 3.8–10.5)

## 2022-03-08 PROCEDURE — 99215 OFFICE O/P EST HI 40 MIN: CPT

## 2022-03-08 NOTE — HISTORY OF PRESENT ILLNESS
[de-identified] : Referred by: Dr. Adrian Kc\par Diagnosis: very high risk locally advanced prostate cancer \par \par Mr. Portillo presented at age 71 in February 2022 for evaluation of locally advanced prostate cancer. \par The patient has a medical history of active smoking. \par \par Presenting HPI: Sriram was sent in for evaluation for locally advanced prostate cancer after recently being seen by uro-oncologist Dr. Kc.  He initially presented to Dr. Kc with an elevated PSA. MRI pelvis on 12/9/21 revealed 1 PIRADS 5 lesion with bilateral neurovascular bundle involvement and bilateral seminal vesicle invasion, no pelvic adenopathy. He underwent prostate biopsy on 2/14/22 which revealed high risk prostate cancer in 13/13 cores, idania 8  (4+4) and 9 (4+5). He is currently taking tamsulosin and finasteride. He reports extreme urinary urgency and constipation. He is currently taking dulcolax daily but still having difficulty with bowel movements. He reports he has lost 30lb over the past 2 years 2/2 difficulty eating 2/2 constipation. His bone scan is scheduled and he is ordered for CT chest as well. He was referred to medical oncology and radiation oncology, Dr. Alvarado. \par \par Imaging: \par MRI pelvis 12/9/21: 1 PIRADS 5 lesion with bilateral neurovascular bundle involvement and bilateral seminal vesicle invasion, no pelvic adenopathy.\par NM bone scan 2/24/22: focal increased radiotracer activity in L femoral, R 3rd rib and sacrum, c/f osseous metastatic disease. \par \par Path: \par Prostate biopsy 2/1/22: prostate adenocarcinoma, Idania 8 (4+4) and 9 (4+5) in all cores, +PNI\par \par Genetics: not done\par \par HCM: \par - COVID vaccination: s/p booster January 2022\par - Colonoscopy: done 2 years ago, normal \par - Lung cancer screen: pending CT chest high res\par \par SH: \par - Occupation: previously worked in photo finishes \par - Living situation: lives in [place], his wife passed away, he is here with his sister and HCP today (Maylin) \par - Smoking/etoh/illicits: he actively smokes 10 cigarettes / day x many years, denies etoh, denies illicits \par - Exercise: minimally active \par \par FH: \par - No family history of cancer  [de-identified] : Sriram presents for follow up on 3/8/22 for prostate cancer. \par Pending PSMA PET to rule out metastatic disease. \par PSA 2/18/22 was 25.9. \par \par Labs from 2/18/22 reviewed: notable for WBC 10.71, Hb 15.7, Plt 283. 71% Neutrophils.\par Iron studies sufficient. Ferritin 640, B12 742, folate 4.9. ESR 28, \par Alk Phos 149. Cr 1.03. LFTs wnl. \par PSA 25.90. Free PSA 4.15, Free PSA 16%. \par Hep B core Ab +, Hep C + \par \par At today's visit he states he continues to have difficulty urinating and constipation. He is taking dulcolax every 3 days to have a bowel movement. He denies any fevers, chills or foul smelling urine. He initiated bicalutamide approximately 2 weeks ago and is here for lupron injection today. He denies hot flashes or significant fatigue from the bicalutamide.\par NM bone scan on 2/24/22 revealed focal increased radiotracer activity in L femoral, R 3rd rib and sacrum, c/f osseous metastatic disease. \par \par Lab studies today reviewed and notable for WBC 7.70, Hb 16.3, Plt 144. \par

## 2022-03-08 NOTE — CONSULT LETTER
[Dear  ___] : Dear  [unfilled], [Courtesy Letter:] : I had the pleasure of seeing your patient, [unfilled], in my office today. [Please see my note below.] : Please see my note below. [Consult Closing:] : Thank you very much for allowing me to participate in the care of this patient.  If you have any questions, please do not hesitate to contact me. [Sincerely,] : Sincerely, [FreeTextEntry2] : Dr. Adrian Kc [FreeTextEntry3] : Dr. Elizabeth Edwards\par  [DrDaniel  ___] : Dr. SANTIAGO

## 2022-03-08 NOTE — RESULTS/DATA
[FreeTextEntry1] : Mr. Portillo presented at age 71 in February 2022 for evaluation of locally advanced prostate cancer. \par The patient has a medical history of active smoking. \par \par Bone scan concerning for osseous metastatic disease. Patient states he is asymptomatic and without bone pain. \par Pending PSMA PET CT. \par Continue bicalutamide, start lupron today 3/8/22. Consider addition of abiraterone if positive for metastatic disease. \par RT evaluation. \par Start folic acid supplement.

## 2022-03-08 NOTE — PHYSICAL EXAM
[Ambulatory and capable of all self care but unable to carry out any work activities] : Status 2- Ambulatory and capable of all self care but unable to carry out any work activities. Up and about more than 50% of waking hours [Normal] : affect appropriate [de-identified] : thin male, pleasant, NAD

## 2022-03-09 ENCOUNTER — NON-APPOINTMENT (OUTPATIENT)
Age: 72
End: 2022-03-09

## 2022-03-09 LAB
ALBUMIN SERPL ELPH-MCNC: 4.5 G/DL — SIGNIFICANT CHANGE UP (ref 3.3–5)
ALP SERPL-CCNC: 171 U/L — HIGH (ref 40–120)
ALT FLD-CCNC: 25 U/L — SIGNIFICANT CHANGE UP (ref 10–45)
ANION GAP SERPL CALC-SCNC: 18 MMOL/L — HIGH (ref 5–17)
AST SERPL-CCNC: 29 U/L — SIGNIFICANT CHANGE UP (ref 10–40)
BILIRUB SERPL-MCNC: 0.4 MG/DL — SIGNIFICANT CHANGE UP (ref 0.2–1.2)
BUN SERPL-MCNC: 25 MG/DL — HIGH (ref 7–23)
CALCIUM SERPL-MCNC: 9.7 MG/DL — SIGNIFICANT CHANGE UP (ref 8.4–10.5)
CHLORIDE SERPL-SCNC: 104 MMOL/L — SIGNIFICANT CHANGE UP (ref 96–108)
CO2 SERPL-SCNC: 22 MMOL/L — SIGNIFICANT CHANGE UP (ref 22–31)
CREAT SERPL-MCNC: 0.98 MG/DL — SIGNIFICANT CHANGE UP (ref 0.5–1.3)
D DIMER BLD IA.RAPID-MCNC: 336 NG/ML DDU — HIGH
EGFR: 82 ML/MIN/1.73M2 — SIGNIFICANT CHANGE UP
GLUCOSE SERPL-MCNC: 143 MG/DL — HIGH (ref 70–99)
HBV DNA # SERPL NAA+PROBE: SIGNIFICANT CHANGE UP
HBV DNA SERPL NAA+PROBE-LOG#: SIGNIFICANT CHANGE UP LOGIU/ML
HCV RNA SPEC NAA+PROBE-LOG IU: SIGNIFICANT CHANGE UP
HCV RNA SPEC NAA+PROBE-LOG IU: SIGNIFICANT CHANGE UP LOGIU/ML
POTASSIUM SERPL-MCNC: 4.4 MMOL/L — SIGNIFICANT CHANGE UP (ref 3.5–5.3)
POTASSIUM SERPL-SCNC: 4.4 MMOL/L — SIGNIFICANT CHANGE UP (ref 3.5–5.3)
PROT SERPL-MCNC: 7.5 G/DL — SIGNIFICANT CHANGE UP (ref 6–8.3)
SODIUM SERPL-SCNC: 145 MMOL/L — SIGNIFICANT CHANGE UP (ref 135–145)
SURGICAL PATHOLOGY STUDY: SIGNIFICANT CHANGE UP

## 2022-03-16 ENCOUNTER — RESULT REVIEW (OUTPATIENT)
Age: 72
End: 2022-03-16

## 2022-03-16 ENCOUNTER — APPOINTMENT (OUTPATIENT)
Dept: HEMATOLOGY ONCOLOGY | Facility: CLINIC | Age: 72
End: 2022-03-16

## 2022-03-16 LAB
BASOPHILS # BLD AUTO: 0.04 K/UL — SIGNIFICANT CHANGE UP (ref 0–0.2)
BASOPHILS NFR BLD AUTO: 0.5 % — SIGNIFICANT CHANGE UP (ref 0–2)
EOSINOPHIL # BLD AUTO: 0.21 K/UL — SIGNIFICANT CHANGE UP (ref 0–0.5)
EOSINOPHIL NFR BLD AUTO: 2.4 % — SIGNIFICANT CHANGE UP (ref 0–6)
HCT VFR BLD CALC: 44.6 % — SIGNIFICANT CHANGE UP (ref 39–50)
HGB BLD-MCNC: 15 G/DL — SIGNIFICANT CHANGE UP (ref 13–17)
IMM GRANULOCYTES NFR BLD AUTO: 0.3 % — SIGNIFICANT CHANGE UP (ref 0–1.5)
LYMPHOCYTES # BLD AUTO: 1.86 K/UL — SIGNIFICANT CHANGE UP (ref 1–3.3)
LYMPHOCYTES # BLD AUTO: 21.2 % — SIGNIFICANT CHANGE UP (ref 13–44)
MCHC RBC-ENTMCNC: 31.3 PG — SIGNIFICANT CHANGE UP (ref 27–34)
MCHC RBC-ENTMCNC: 33.6 GM/DL — SIGNIFICANT CHANGE UP (ref 32–36)
MCV RBC AUTO: 92.9 FL — SIGNIFICANT CHANGE UP (ref 80–100)
MONOCYTES # BLD AUTO: 0.59 K/UL — SIGNIFICANT CHANGE UP (ref 0–0.9)
MONOCYTES NFR BLD AUTO: 6.7 % — SIGNIFICANT CHANGE UP (ref 2–14)
NEUTROPHILS # BLD AUTO: 6.03 K/UL — SIGNIFICANT CHANGE UP (ref 1.8–7.4)
NEUTROPHILS NFR BLD AUTO: 68.9 % — SIGNIFICANT CHANGE UP (ref 43–77)
NRBC # BLD: 0 /100 WBCS — SIGNIFICANT CHANGE UP (ref 0–0)
PLATELET # BLD AUTO: 171 K/UL — SIGNIFICANT CHANGE UP (ref 150–400)
RBC # BLD: 4.8 M/UL — SIGNIFICANT CHANGE UP (ref 4.2–5.8)
RBC # FLD: 15.3 % — HIGH (ref 10.3–14.5)
WBC # BLD: 8.76 K/UL — SIGNIFICANT CHANGE UP (ref 3.8–10.5)
WBC # FLD AUTO: 8.76 K/UL — SIGNIFICANT CHANGE UP (ref 3.8–10.5)

## 2022-03-16 PROCEDURE — 80053 COMPREHEN METABOLIC PANEL: CPT

## 2022-03-16 PROCEDURE — 86803 HEPATITIS C AB TEST: CPT

## 2022-03-16 PROCEDURE — 87340 HEPATITIS B SURFACE AG IA: CPT

## 2022-03-16 PROCEDURE — 83540 ASSAY OF IRON: CPT

## 2022-03-16 PROCEDURE — 87521 HEPATITIS C PROBE&RVRS TRNSC: CPT

## 2022-03-16 PROCEDURE — 85730 THROMBOPLASTIN TIME PARTIAL: CPT

## 2022-03-16 PROCEDURE — 86704 HEP B CORE ANTIBODY TOTAL: CPT

## 2022-03-16 PROCEDURE — 87522 HEPATITIS C REVRS TRNSCRPJ: CPT

## 2022-03-16 PROCEDURE — 96372 THER/PROPH/DIAG INJ SC/IM: CPT

## 2022-03-16 PROCEDURE — 86706 HEP B SURFACE ANTIBODY: CPT

## 2022-03-16 PROCEDURE — 87517 HEPATITIS B DNA QUANT: CPT

## 2022-03-16 PROCEDURE — 85027 COMPLETE CBC AUTOMATED: CPT

## 2022-03-16 PROCEDURE — 82607 VITAMIN B-12: CPT

## 2022-03-16 PROCEDURE — 82746 ASSAY OF FOLIC ACID SERUM: CPT

## 2022-03-16 PROCEDURE — 82728 ASSAY OF FERRITIN: CPT

## 2022-03-16 PROCEDURE — 36415 COLL VENOUS BLD VENIPUNCTURE: CPT

## 2022-03-16 PROCEDURE — 83550 IRON BINDING TEST: CPT

## 2022-03-16 PROCEDURE — 86140 C-REACTIVE PROTEIN: CPT

## 2022-03-16 PROCEDURE — 85610 PROTHROMBIN TIME: CPT

## 2022-03-16 PROCEDURE — 85379 FIBRIN DEGRADATION QUANT: CPT

## 2022-03-16 PROCEDURE — 87389 HIV-1 AG W/HIV-1&-2 AB AG IA: CPT

## 2022-03-16 PROCEDURE — 84153 ASSAY OF PSA TOTAL: CPT

## 2022-03-16 PROCEDURE — 84466 ASSAY OF TRANSFERRIN: CPT

## 2022-03-16 PROCEDURE — 85652 RBC SED RATE AUTOMATED: CPT

## 2022-03-16 PROCEDURE — 84154 ASSAY OF PSA FREE: CPT

## 2022-03-16 PROCEDURE — 85384 FIBRINOGEN ACTIVITY: CPT

## 2022-03-17 LAB
APTT BLD: 35.1 SEC — SIGNIFICANT CHANGE UP (ref 27.5–35.5)
D DIMER BLD IA.RAPID-MCNC: 331 NG/ML DDU — HIGH
FIBRINOGEN PPP-MCNC: 462 MG/DL — SIGNIFICANT CHANGE UP (ref 330–520)
INR BLD: 0.97 RATIO — SIGNIFICANT CHANGE UP (ref 0.88–1.16)
PROTHROM AB SERPL-ACNC: 11.4 SEC — SIGNIFICANT CHANGE UP (ref 10.5–13.4)

## 2022-03-24 ENCOUNTER — OUTPATIENT (OUTPATIENT)
Dept: OUTPATIENT SERVICES | Facility: HOSPITAL | Age: 72
LOS: 1 days | End: 2022-03-24
Payer: MEDICARE

## 2022-03-24 DIAGNOSIS — C61 MALIGNANT NEOPLASM OF PROSTATE: ICD-10-CM

## 2022-03-25 ENCOUNTER — RESULT REVIEW (OUTPATIENT)
Age: 72
End: 2022-03-25

## 2022-03-25 ENCOUNTER — APPOINTMENT (OUTPATIENT)
Dept: HEMATOLOGY ONCOLOGY | Facility: CLINIC | Age: 72
End: 2022-03-25
Payer: MEDICARE

## 2022-03-25 VITALS
WEIGHT: 139.77 LBS | SYSTOLIC BLOOD PRESSURE: 165 MMHG | RESPIRATION RATE: 18 BRPM | OXYGEN SATURATION: 98 % | BODY MASS INDEX: 18.44 KG/M2 | TEMPERATURE: 97.7 F | HEART RATE: 75 BPM | DIASTOLIC BLOOD PRESSURE: 80 MMHG

## 2022-03-25 LAB
ALBUMIN SERPL ELPH-MCNC: 4.3 G/DL — SIGNIFICANT CHANGE UP (ref 3.3–5)
ALP SERPL-CCNC: 136 U/L — HIGH (ref 40–120)
ALT FLD-CCNC: 21 U/L — SIGNIFICANT CHANGE UP (ref 10–45)
ANION GAP SERPL CALC-SCNC: 12 MMOL/L — SIGNIFICANT CHANGE UP (ref 5–17)
AST SERPL-CCNC: 25 U/L — SIGNIFICANT CHANGE UP (ref 10–40)
BASOPHILS # BLD AUTO: 0.04 K/UL — SIGNIFICANT CHANGE UP (ref 0–0.2)
BASOPHILS NFR BLD AUTO: 0.5 % — SIGNIFICANT CHANGE UP (ref 0–2)
BILIRUB SERPL-MCNC: 0.6 MG/DL — SIGNIFICANT CHANGE UP (ref 0.2–1.2)
BUN SERPL-MCNC: 25 MG/DL — HIGH (ref 7–23)
CALCIUM SERPL-MCNC: 9.1 MG/DL — SIGNIFICANT CHANGE UP (ref 8.4–10.5)
CHLORIDE SERPL-SCNC: 101 MMOL/L — SIGNIFICANT CHANGE UP (ref 96–108)
CO2 SERPL-SCNC: 27 MMOL/L — SIGNIFICANT CHANGE UP (ref 22–31)
CREAT SERPL-MCNC: 0.99 MG/DL — SIGNIFICANT CHANGE UP (ref 0.5–1.3)
EGFR: 81 ML/MIN/1.73M2 — SIGNIFICANT CHANGE UP
EOSINOPHIL # BLD AUTO: 0.21 K/UL — SIGNIFICANT CHANGE UP (ref 0–0.5)
EOSINOPHIL NFR BLD AUTO: 2.8 % — SIGNIFICANT CHANGE UP (ref 0–6)
GLUCOSE SERPL-MCNC: 190 MG/DL — HIGH (ref 70–99)
HCT VFR BLD CALC: 44.6 % — SIGNIFICANT CHANGE UP (ref 39–50)
HGB BLD-MCNC: 15.1 G/DL — SIGNIFICANT CHANGE UP (ref 13–17)
IMM GRANULOCYTES NFR BLD AUTO: 0.4 % — SIGNIFICANT CHANGE UP (ref 0–1.5)
LYMPHOCYTES # BLD AUTO: 1.66 K/UL — SIGNIFICANT CHANGE UP (ref 1–3.3)
LYMPHOCYTES # BLD AUTO: 21.8 % — SIGNIFICANT CHANGE UP (ref 13–44)
MCHC RBC-ENTMCNC: 31.6 PG — SIGNIFICANT CHANGE UP (ref 27–34)
MCHC RBC-ENTMCNC: 33.9 GM/DL — SIGNIFICANT CHANGE UP (ref 32–36)
MCV RBC AUTO: 93.3 FL — SIGNIFICANT CHANGE UP (ref 80–100)
MONOCYTES # BLD AUTO: 0.56 K/UL — SIGNIFICANT CHANGE UP (ref 0–0.9)
MONOCYTES NFR BLD AUTO: 7.4 % — SIGNIFICANT CHANGE UP (ref 2–14)
NEUTROPHILS # BLD AUTO: 5.11 K/UL — SIGNIFICANT CHANGE UP (ref 1.8–7.4)
NEUTROPHILS NFR BLD AUTO: 67.1 % — SIGNIFICANT CHANGE UP (ref 43–77)
NRBC # BLD: 0 /100 WBCS — SIGNIFICANT CHANGE UP (ref 0–0)
PLATELET # BLD AUTO: 151 K/UL — SIGNIFICANT CHANGE UP (ref 150–400)
POTASSIUM SERPL-MCNC: 3.6 MMOL/L — SIGNIFICANT CHANGE UP (ref 3.5–5.3)
POTASSIUM SERPL-SCNC: 3.6 MMOL/L — SIGNIFICANT CHANGE UP (ref 3.5–5.3)
PROT SERPL-MCNC: 6.6 G/DL — SIGNIFICANT CHANGE UP (ref 6–8.3)
PSA FREE FLD-MCNC: 1.34 NG/ML — SIGNIFICANT CHANGE UP
PSA FREE FLD-MCNC: 18 % — SIGNIFICANT CHANGE UP
PSA SERPL-MCNC: 7.47 NG/ML — HIGH (ref 0–4)
RBC # BLD: 4.78 M/UL — SIGNIFICANT CHANGE UP (ref 4.2–5.8)
RBC # FLD: 15.4 % — HIGH (ref 10.3–14.5)
SODIUM SERPL-SCNC: 140 MMOL/L — SIGNIFICANT CHANGE UP (ref 135–145)
WBC # BLD: 7.61 K/UL — SIGNIFICANT CHANGE UP (ref 3.8–10.5)
WBC # FLD AUTO: 7.61 K/UL — SIGNIFICANT CHANGE UP (ref 3.8–10.5)

## 2022-03-25 PROCEDURE — 99215 OFFICE O/P EST HI 40 MIN: CPT

## 2022-03-25 NOTE — HISTORY OF PRESENT ILLNESS
[de-identified] : Referred by: Dr. Adrian Kc\par Diagnosis: very high risk locally advanced prostate cancer \par \par Mr. Portillo presented at age 71 in February 2022 for evaluation of locally advanced prostate cancer. \par The patient has a medical history of active smoking. \par \par Presenting HPI: Sriram was sent in for evaluation for locally advanced prostate cancer after recently being seen by uro-oncologist Dr. Kc.  He initially presented to Dr. Kc with an elevated PSA. MRI pelvis on 12/9/21 revealed 1 PIRADS 5 lesion with bilateral neurovascular bundle involvement and bilateral seminal vesicle invasion, no pelvic adenopathy. He underwent prostate biopsy on 2/14/22 which revealed high risk prostate cancer in 13/13 cores, idania 8  (4+4) and 9 (4+5). He is currently taking tamsulosin and finasteride. He reports extreme urinary urgency and constipation. He is currently taking dulcolax daily but still having difficulty with bowel movements. He reports he has lost 30lb over the past 2 years 2/2 difficulty eating 2/2 constipation. His bone scan is scheduled and he is ordered for CT chest as well. He was referred to medical oncology and radiation oncology, Dr. Alvarado. \par \par Imaging: \par MRI pelvis 12/9/21: 1 PIRADS 5 lesion with bilateral neurovascular bundle involvement and bilateral seminal vesicle invasion, no pelvic adenopathy.\par NM bone scan 2/24/22: focal increased radiotracer activity in L femoral, R 3rd rib and sacrum, c/f osseous metastatic disease. \par \par Path: \par Prostate biopsy 2/1/22: prostate adenocarcinoma, Idania 8 (4+4) and 9 (4+5) in all cores, +PNI\par \par Genetics: not done\par \par HCM: \par - COVID vaccination: s/p booster January 2022\par - Colonoscopy: done 2 years ago, normal \par - Lung cancer screen: pending CT chest high res\par \par SH: \par - Occupation: previously worked in photo finishes \par - Living situation: lives in [place], his wife passed away, he is here with his sister and HCP today (Maylin) \par - Smoking/etoh/illicits: he actively smokes 10 cigarettes / day x many years, denies etoh, denies illicits \par - Exercise: minimally active \par \par FH: \par - No family history of cancer  [de-identified] : Sriram presents for follow up on 3/25/22 for prostate cancer. \par PSA 2/18/22 was 25.9. Repeat PSA 14.60 on 3/8/22. \par NM bone scan on 2/24/22 revealed focal increased radiotracer activity in L femoral, R 3rd rib and sacrum, c/f osseous metastatic disease. \par \par He received his first dose lupron on 3/8/22. PSMA PET was declined by insurance. He was seen by Dr. Alvarado this week who recommended RT to the prostate bed. He denies significant hot flashes or fatigue. He has been urinating more easily. His weight is stable and his appetite is good. Case discussed with Dr. Alvarado and Dr. Kc. Will plan to initiate abiraterone + prednisone at this time and stop bicalutamide. \par \par Lab studies today reviewed and notable for WBC 7.61, Hb 15.1, Plt 151 \par \par PSA trend: \par 3/8/22 (first lupron) PSA 14.6\par 3/3/22 PSA 25.90. Free PSA 4.15, Free PSA 16%. \par Hep B core Ab +, Hep B PCR negative, Hep C +, Hep C RNA not detected

## 2022-03-25 NOTE — RESULTS/DATA
[FreeTextEntry1] : Mr. Portillo presented at age 71 in February 2022 for evaluation of locally advanced prostate cancer. \par The patient has a medical history of active smoking. \par \par Bone scan concerning for osseous metastatic disease. Patient states he is asymptomatic and without bone pain. \par PSMA PET CT denied by insurance. \par Continue lupron next due 5/31/22. Add abiraterone + prednisone. STOP bicalutamide. \par Lab check in 3 weeks. \par Follow up with Dr. Alvarado from RT and Dr. Kc from urologic oncology.

## 2022-03-25 NOTE — CONSULT LETTER
[Dear  ___] : Dear  [unfilled], [Consult Letter:] : I had the pleasure of evaluating your patient, [unfilled]. [Please see my note below.] : Please see my note below. [Consult Closing:] : Thank you very much for allowing me to participate in the care of this patient.  If you have any questions, please do not hesitate to contact me. [Sincerely,] : Sincerely, [FreeTextEntry3] : Dr. Elizabeth Edwards\par  [FreeTextEntry2] : Dr. Adrian Kc

## 2022-03-25 NOTE — PHYSICAL EXAM
[Ambulatory and capable of all self care but unable to carry out any work activities] : Status 2- Ambulatory and capable of all self care but unable to carry out any work activities. Up and about more than 50% of waking hours [Normal] : affect appropriate [de-identified] : thin male, pleasant, NAD

## 2022-03-26 LAB — FOLATE SERPL-MCNC: >20 NG/ML — SIGNIFICANT CHANGE UP

## 2022-03-29 ENCOUNTER — NON-APPOINTMENT (OUTPATIENT)
Age: 72
End: 2022-03-29

## 2022-04-06 ENCOUNTER — APPOINTMENT (OUTPATIENT)
Dept: CARDIOLOGY | Facility: CLINIC | Age: 72
End: 2022-04-06

## 2022-04-12 ENCOUNTER — APPOINTMENT (OUTPATIENT)
Dept: CT IMAGING | Facility: CLINIC | Age: 72
End: 2022-04-12
Payer: MEDICARE

## 2022-04-12 ENCOUNTER — APPOINTMENT (OUTPATIENT)
Dept: RADIOLOGY | Facility: CLINIC | Age: 72
End: 2022-04-12
Payer: MEDICARE

## 2022-04-12 PROCEDURE — 71046 X-RAY EXAM CHEST 2 VIEWS: CPT

## 2022-04-12 PROCEDURE — 71260 CT THORAX DX C+: CPT

## 2022-04-14 ENCOUNTER — APPOINTMENT (OUTPATIENT)
Dept: CARDIOLOGY | Facility: CLINIC | Age: 72
End: 2022-04-14

## 2022-04-15 ENCOUNTER — RESULT REVIEW (OUTPATIENT)
Age: 72
End: 2022-04-15

## 2022-04-15 ENCOUNTER — APPOINTMENT (OUTPATIENT)
Dept: HEMATOLOGY ONCOLOGY | Facility: CLINIC | Age: 72
End: 2022-04-15
Payer: MEDICARE

## 2022-04-15 VITALS
HEIGHT: 73 IN | SYSTOLIC BLOOD PRESSURE: 134 MMHG | OXYGEN SATURATION: 96 % | HEART RATE: 89 BPM | BODY MASS INDEX: 18.71 KG/M2 | DIASTOLIC BLOOD PRESSURE: 83 MMHG | WEIGHT: 141.2 LBS | TEMPERATURE: 98.2 F

## 2022-04-15 LAB
BASOPHILS # BLD AUTO: 0.05 K/UL — SIGNIFICANT CHANGE UP (ref 0–0.2)
BASOPHILS NFR BLD AUTO: 0.5 % — SIGNIFICANT CHANGE UP (ref 0–2)
EOSINOPHIL # BLD AUTO: 0.12 K/UL — SIGNIFICANT CHANGE UP (ref 0–0.5)
EOSINOPHIL NFR BLD AUTO: 1.1 % — SIGNIFICANT CHANGE UP (ref 0–6)
HCT VFR BLD CALC: 46.2 % — SIGNIFICANT CHANGE UP (ref 39–50)
HGB BLD-MCNC: 15.6 G/DL — SIGNIFICANT CHANGE UP (ref 13–17)
IMM GRANULOCYTES NFR BLD AUTO: 0.4 % — SIGNIFICANT CHANGE UP (ref 0–1.5)
LYMPHOCYTES # BLD AUTO: 1.52 K/UL — SIGNIFICANT CHANGE UP (ref 1–3.3)
LYMPHOCYTES # BLD AUTO: 14.2 % — SIGNIFICANT CHANGE UP (ref 13–44)
MCHC RBC-ENTMCNC: 31.4 PG — SIGNIFICANT CHANGE UP (ref 27–34)
MCHC RBC-ENTMCNC: 33.8 GM/DL — SIGNIFICANT CHANGE UP (ref 32–36)
MCV RBC AUTO: 93 FL — SIGNIFICANT CHANGE UP (ref 80–100)
MONOCYTES # BLD AUTO: 0.62 K/UL — SIGNIFICANT CHANGE UP (ref 0–0.9)
MONOCYTES NFR BLD AUTO: 5.8 % — SIGNIFICANT CHANGE UP (ref 2–14)
NEUTROPHILS # BLD AUTO: 8.32 K/UL — HIGH (ref 1.8–7.4)
NEUTROPHILS NFR BLD AUTO: 78 % — HIGH (ref 43–77)
NRBC # BLD: 0 /100 WBCS — SIGNIFICANT CHANGE UP (ref 0–0)
PLATELET # BLD AUTO: 162 K/UL — SIGNIFICANT CHANGE UP (ref 150–400)
RBC # BLD: 4.97 M/UL — SIGNIFICANT CHANGE UP (ref 4.2–5.8)
RBC # FLD: 15.3 % — HIGH (ref 10.3–14.5)
WBC # BLD: 10.67 K/UL — HIGH (ref 3.8–10.5)
WBC # FLD AUTO: 10.67 K/UL — HIGH (ref 3.8–10.5)

## 2022-04-15 PROCEDURE — 82746 ASSAY OF FOLIC ACID SERUM: CPT

## 2022-04-15 PROCEDURE — 84154 ASSAY OF PSA FREE: CPT

## 2022-04-15 PROCEDURE — 84153 ASSAY OF PSA TOTAL: CPT

## 2022-04-15 PROCEDURE — 80053 COMPREHEN METABOLIC PANEL: CPT

## 2022-04-15 PROCEDURE — 84403 ASSAY OF TOTAL TESTOSTERONE: CPT

## 2022-04-15 PROCEDURE — 84402 ASSAY OF FREE TESTOSTERONE: CPT

## 2022-04-15 PROCEDURE — 85027 COMPLETE CBC AUTOMATED: CPT

## 2022-04-15 PROCEDURE — 99215 OFFICE O/P EST HI 40 MIN: CPT

## 2022-04-16 LAB — TESTOST FREE+TOTAL PANEL SERPL-MCNC: <2.5 NG/DL — LOW (ref 193–740)

## 2022-04-16 NOTE — PHYSICAL EXAM
[Ambulatory and capable of all self care but unable to carry out any work activities] : Status 2- Ambulatory and capable of all self care but unable to carry out any work activities. Up and about more than 50% of waking hours [Normal] : affect appropriate [de-identified] : thin male, pleasant, NAD

## 2022-04-16 NOTE — HISTORY OF PRESENT ILLNESS
[de-identified] : Referred by: Dr. Adrian Kc\par Diagnosis: very high risk locally advanced prostate cancer \par \par Mr. Portillo presented at age 71 in February 2022 for evaluation of locally advanced prostate cancer. \par The patient has a medical history of active smoking. \par \par Presenting HPI: Sriram was sent in for evaluation for locally advanced prostate cancer after recently being seen by uro-oncologist Dr. Kc.  He initially presented to Dr. Kc with an elevated PSA. MRI pelvis on 12/9/21 revealed 1 PIRADS 5 lesion with bilateral neurovascular bundle involvement and bilateral seminal vesicle invasion, no pelvic adenopathy. He underwent prostate biopsy on 2/14/22 which revealed high risk prostate cancer in 13/13 cores, idania 8  (4+4) and 9 (4+5). He is currently taking tamsulosin and finasteride. He reports extreme urinary urgency and constipation. He is currently taking dulcolax daily but still having difficulty with bowel movements. He reports he has lost 30lb over the past 2 years 2/2 difficulty eating 2/2 constipation. His bone scan is scheduled and he is ordered for CT chest as well. He was referred to medical oncology and radiation oncology, Dr. Alvarado. \par \par Imaging: \par MRI pelvis 12/9/21: 1 PIRADS 5 lesion with bilateral neurovascular bundle involvement and bilateral seminal vesicle invasion, no pelvic adenopathy.\par NM bone scan 2/24/22: focal increased radiotracer activity in L femoral, R 3rd rib and sacrum, c/f osseous metastatic disease. \par \par Path: \par Prostate biopsy 2/1/22: prostate adenocarcinoma, Idania 8 (4+4) and 9 (4+5) in all cores, +PNI\par \par Genetics: not done\par \par HCM: \par - COVID vaccination: s/p booster January 2022\par - Colonoscopy: done 2 years ago, normal \par - Lung cancer screen: pending CT chest high res\par \par SH: \par - Occupation: previously worked in photo finishes \par - Living situation: lives in [place], his wife passed away, he is here with his sister and HCP today (Maylin) \par - Smoking/etoh/illicits: he actively smokes 10 cigarettes / day x many years, denies etoh, denies illicits \par - Exercise: minimally active \par \par FH: \par - No family history of cancer  [de-identified] : Sriram presents for follow up on 4/15/22 for prostate cancer. \par PSA 2/18/22 was 25.9. Repeat PSA 14.60 on 3/8/22. \par NM bone scan on 2/24/22 revealed focal increased radiotracer activity in L femoral, R 3rd rib and sacrum, c/f osseous metastatic disease. \par Lupron 3/8/22. Next dose due 5/31/22. \par \par At today's visit he states he is feeling generally well. He is urinating more easily. He started abiraterone 1000mg daily + prednisone 2 weeks ago (4/1/22). He does report mild fatigue and mild hot flashes. His weight is stable but he does not have a great appetite. CT chest 4/12/22 revealed liver metastases, R 3rd rib mets, additional possible sclerotic mets, few indeterminate sub-4mm lung nodules. He denies fevers, chills, CP, SOB, n/v/d, LE edema. \par \par Lab studies today reviewed and notable for WBC 10.67, Hb 15.6, Plt 162\par \par PSA trend: \par 4/15/22: PSA 2.23\par 3/8/22 (first lupron) PSA 14.6\par 3/3/22 PSA 25.90. Free PSA 4.15, Free PSA 16%.

## 2022-04-16 NOTE — RESULTS/DATA
[FreeTextEntry1] : Mr. Portillo presented at age 71 in February 2022 for evaluation of locally advanced prostate cancer. \par The patient has a medical history of active smoking. \par \par Bone scan concerning for osseous metastatic disease. Patient states he is asymptomatic and without bone pain. \par PSMA PET CT denied by insurance. CT chest revealed liver mets, bone mets. Small pulmonary nodules. \par Continue lupron next due 5/31/22. Started on abiraterone/pred. PSA responding nicely. \par Follow up with Dr. Alvarado from RT and Dr. Kc from urologic oncology. \par Consider addition of docetaxel if PS improves.

## 2022-04-18 LAB
ALBUMIN SERPL ELPH-MCNC: 4.2 G/DL
ALP BLD-CCNC: 125 U/L
ALT SERPL-CCNC: 29 U/L
ANION GAP SERPL CALC-SCNC: 16 MMOL/L
AST SERPL-CCNC: 27 U/L
BILIRUB SERPL-MCNC: 0.7 MG/DL
BUN SERPL-MCNC: 19 MG/DL
CALCIUM SERPL-MCNC: 9.3 MG/DL
CHLORIDE SERPL-SCNC: 101 MMOL/L
CO2 SERPL-SCNC: 24 MMOL/L
CREAT SERPL-MCNC: 0.96 MG/DL
EGFR: 84 ML/MIN/1.73M2
GLUCOSE SERPL-MCNC: 166 MG/DL
POTASSIUM SERPL-SCNC: 3.9 MMOL/L
PROT SERPL-MCNC: 6.8 G/DL
PSA FREE FLD-MCNC: 20 %
PSA FREE SERPL-MCNC: 0.45 NG/ML
PSA SERPL-MCNC: 2.23 NG/ML
SODIUM SERPL-SCNC: 141 MMOL/L
TESTOST FREE SERPL-MCNC: 0.9 PG/ML — LOW (ref 5.9–27)

## 2022-04-19 ENCOUNTER — NON-APPOINTMENT (OUTPATIENT)
Age: 72
End: 2022-04-19

## 2022-05-27 ENCOUNTER — OUTPATIENT (OUTPATIENT)
Dept: OUTPATIENT SERVICES | Facility: HOSPITAL | Age: 72
LOS: 1 days | End: 2022-05-27
Payer: MEDICARE

## 2022-05-27 DIAGNOSIS — D64.9 ANEMIA, UNSPECIFIED: ICD-10-CM

## 2022-05-27 DIAGNOSIS — C61 MALIGNANT NEOPLASM OF PROSTATE: ICD-10-CM

## 2022-05-31 ENCOUNTER — RESULT REVIEW (OUTPATIENT)
Age: 72
End: 2022-05-31

## 2022-05-31 ENCOUNTER — APPOINTMENT (OUTPATIENT)
Dept: HEMATOLOGY ONCOLOGY | Facility: CLINIC | Age: 72
End: 2022-05-31
Payer: MEDICARE

## 2022-05-31 ENCOUNTER — APPOINTMENT (OUTPATIENT)
Dept: INFUSION THERAPY | Facility: CANCER CENTER | Age: 72
End: 2022-05-31

## 2022-05-31 VITALS
TEMPERATURE: 97.2 F | HEART RATE: 84 BPM | OXYGEN SATURATION: 97 % | DIASTOLIC BLOOD PRESSURE: 67 MMHG | SYSTOLIC BLOOD PRESSURE: 146 MMHG | BODY MASS INDEX: 19.05 KG/M2 | WEIGHT: 144.4 LBS

## 2022-05-31 LAB
BASOPHILS # BLD AUTO: 0.06 K/UL — SIGNIFICANT CHANGE UP (ref 0–0.2)
BASOPHILS NFR BLD AUTO: 0.6 % — SIGNIFICANT CHANGE UP (ref 0–2)
EOSINOPHIL # BLD AUTO: 0.26 K/UL — SIGNIFICANT CHANGE UP (ref 0–0.5)
EOSINOPHIL NFR BLD AUTO: 2.6 % — SIGNIFICANT CHANGE UP (ref 0–6)
HCT VFR BLD CALC: 44 % — SIGNIFICANT CHANGE UP (ref 39–50)
HGB BLD-MCNC: 14.9 G/DL — SIGNIFICANT CHANGE UP (ref 13–17)
IMM GRANULOCYTES NFR BLD AUTO: 0.4 % — SIGNIFICANT CHANGE UP (ref 0–1.5)
LYMPHOCYTES # BLD AUTO: 2.14 K/UL — SIGNIFICANT CHANGE UP (ref 1–3.3)
LYMPHOCYTES # BLD AUTO: 21.4 % — SIGNIFICANT CHANGE UP (ref 13–44)
MCHC RBC-ENTMCNC: 32 PG — SIGNIFICANT CHANGE UP (ref 27–34)
MCHC RBC-ENTMCNC: 33.9 GM/DL — SIGNIFICANT CHANGE UP (ref 32–36)
MCV RBC AUTO: 94.6 FL — SIGNIFICANT CHANGE UP (ref 80–100)
MONOCYTES # BLD AUTO: 0.72 K/UL — SIGNIFICANT CHANGE UP (ref 0–0.9)
MONOCYTES NFR BLD AUTO: 7.2 % — SIGNIFICANT CHANGE UP (ref 2–14)
NEUTROPHILS # BLD AUTO: 6.78 K/UL — SIGNIFICANT CHANGE UP (ref 1.8–7.4)
NEUTROPHILS NFR BLD AUTO: 67.8 % — SIGNIFICANT CHANGE UP (ref 43–77)
NRBC # BLD: 0 /100 WBCS — SIGNIFICANT CHANGE UP (ref 0–0)
PLATELET # BLD AUTO: 146 K/UL — LOW (ref 150–400)
RBC # BLD: 4.65 M/UL — SIGNIFICANT CHANGE UP (ref 4.2–5.8)
RBC # FLD: 14.7 % — HIGH (ref 10.3–14.5)
WBC # BLD: 10 K/UL — SIGNIFICANT CHANGE UP (ref 3.8–10.5)
WBC # FLD AUTO: 10 K/UL — SIGNIFICANT CHANGE UP (ref 3.8–10.5)

## 2022-05-31 PROCEDURE — 99214 OFFICE O/P EST MOD 30 MIN: CPT

## 2022-05-31 PROCEDURE — 85027 COMPLETE CBC AUTOMATED: CPT

## 2022-05-31 PROCEDURE — 96402 CHEMO HORMON ANTINEOPL SQ/IM: CPT

## 2022-05-31 RX ORDER — ACETAMINOPHEN 500 MG
1200-1000 TABLET ORAL DAILY
Qty: 30 | Refills: 5 | Status: ACTIVE | COMMUNITY
Start: 2022-05-31 | End: 1900-01-01

## 2022-05-31 NOTE — RESULTS/DATA
[FreeTextEntry1] : Mr. Portillo presented at age 71 in February 2022 for evaluation of locally advanced prostate cancer. \par The patient has a medical history of active smoking. \par \par Bone scan concerning for osseous metastatic disease. Patient states he is asymptomatic and without bone pain. \par PSMA PET CT denied by insurance. CT chest revealed liver mets, bone mets. Small pulmonary nodules. \par Continue lupron next due 8/25/22. Started on abiraterone/pred. PSA responding nicely. \par Follow up with Dr. Alvarado from RT and Dr. Kc from urologic oncology. \par Consider addition of docetaxel if PS improves.

## 2022-05-31 NOTE — PHYSICAL EXAM
[Ambulatory and capable of all self care but unable to carry out any work activities] : Status 2- Ambulatory and capable of all self care but unable to carry out any work activities. Up and about more than 50% of waking hours [Normal] : affect appropriate [de-identified] : thin male, pleasant, NAD

## 2022-05-31 NOTE — HISTORY OF PRESENT ILLNESS
[de-identified] : Referred by: Dr. Adrian Kc\par Diagnosis: very high risk locally advanced prostate cancer \par \par Mr. Portillo presented at age 71 in February 2022 for evaluation of locally advanced prostate cancer. \par The patient has a medical history of active smoking. \par \par Presenting HPI: Sriram was sent in for evaluation for locally advanced prostate cancer after recently being seen by uro-oncologist Dr. Kc.  He initially presented to Dr. Kc with an elevated PSA. MRI pelvis on 12/9/21 revealed 1 PIRADS 5 lesion with bilateral neurovascular bundle involvement and bilateral seminal vesicle invasion, no pelvic adenopathy. He underwent prostate biopsy on 2/14/22 which revealed high risk prostate cancer in 13/13 cores, idania 8  (4+4) and 9 (4+5). He is currently taking tamsulosin and finasteride. He reports extreme urinary urgency and constipation. He is currently taking dulcolax daily but still having difficulty with bowel movements. He reports he has lost 30lb over the past 2 years 2/2 difficulty eating 2/2 constipation. His bone scan is scheduled and he is ordered for CT chest as well. He was referred to medical oncology and radiation oncology, Dr. Alvarado. \par \par Imaging: \par MRI pelvis 12/9/21: 1 PIRADS 5 lesion with bilateral neurovascular bundle involvement and bilateral seminal vesicle invasion, no pelvic adenopathy.\par NM bone scan 2/24/22: focal increased radiotracer activity in L femoral, R 3rd rib and sacrum, c/f osseous metastatic disease. \par \par Path: \par Prostate biopsy 2/1/22: prostate adenocarcinoma, Idania 8 (4+4) and 9 (4+5) in all cores, +PNI\par \par Genetics: not done\par \par HCM: \par - COVID vaccination: s/p booster January 2022\par - Colonoscopy: done 2 years ago, normal \par - Lung cancer screen: pending CT chest high res\par \par SH: \par - Occupation: previously worked in photo finishes \par - Living situation: lives in [place], his wife passed away, he is here with his sister and HCP today (Maylin) \par - Smoking/etoh/illicits: he actively smokes 10 cigarettes / day x many years, denies etoh, denies illicits \par - Exercise: minimally active \par \par FH: \par - No family history of cancer  [de-identified] : Sriram presents for follow up on 5/31/22 for prostate cancer. \par PSA 2/18/22 was 25.9. Repeat PSA 14.60 on 3/8/22. \par NM bone scan on 2/24/22 revealed focal increased radiotracer activity in L femoral, R 3rd rib and sacrum, c/f osseous metastatic disease. \par CT chest 4/12/22 revealed liver metastases, R 3rd rib mets, additional possible sclerotic mets, few indeterminate sub-4mm lung nodules. \par Lupron 3/8/22. Next dose due 5/31/22. \par Abiraterone started 4/1/22. \par \par He remains on lupron and abiraterone 1000mg daily + prednisone. He reports a good appetite- gained 3 lb. He reports only having 1-2 bowel movements per week. He continues to have mild fatigue and hot flashes. He denies fevers, chills, CP, SOB, n/v/d, LE edema. \par \par Lab studies today reviewed and notable for WBC 10, Hb 14.9, Plt 146\par \par PSA trend: \par 4/15/22: PSA 2.23\par 3/8/22 (first lupron) PSA 14.6\par 3/3/22 PSA 25.90. Free PSA 4.15, Free PSA 16%.

## 2022-06-01 LAB
ALBUMIN SERPL ELPH-MCNC: 4.1 G/DL
ALP BLD-CCNC: 128 U/L
ALT SERPL-CCNC: 19 U/L
ANION GAP SERPL CALC-SCNC: 15 MMOL/L
AST SERPL-CCNC: 25 U/L
BILIRUB SERPL-MCNC: 0.4 MG/DL
BUN SERPL-MCNC: 24 MG/DL
CALCIUM SERPL-MCNC: 9.2 MG/DL
CHLORIDE SERPL-SCNC: 103 MMOL/L
CO2 SERPL-SCNC: 24 MMOL/L
CREAT SERPL-MCNC: 0.9 MG/DL
EGFR: 91 ML/MIN/1.73M2
GLUCOSE SERPL-MCNC: 157 MG/DL
POTASSIUM SERPL-SCNC: 3.5 MMOL/L
PROT SERPL-MCNC: 6.4 G/DL
PSA FREE FLD-MCNC: 24 %
PSA FREE SERPL-MCNC: 0.05 NG/ML
PSA SERPL-MCNC: 0.21 NG/ML
SODIUM SERPL-SCNC: 142 MMOL/L
TESTOST SERPL-MCNC: <2.5 NG/DL

## 2022-06-06 ENCOUNTER — APPOINTMENT (OUTPATIENT)
Dept: RADIOLOGY | Facility: CLINIC | Age: 72
End: 2022-06-06
Payer: MEDICARE

## 2022-06-06 PROCEDURE — 77080 DXA BONE DENSITY AXIAL: CPT

## 2022-06-08 ENCOUNTER — NON-APPOINTMENT (OUTPATIENT)
Age: 72
End: 2022-06-08

## 2022-07-14 ENCOUNTER — RESULT REVIEW (OUTPATIENT)
Age: 72
End: 2022-07-14

## 2022-07-14 ENCOUNTER — LABORATORY RESULT (OUTPATIENT)
Age: 72
End: 2022-07-14

## 2022-07-14 ENCOUNTER — OUTPATIENT (OUTPATIENT)
Dept: OUTPATIENT SERVICES | Facility: HOSPITAL | Age: 72
LOS: 1 days | End: 2022-07-14
Payer: MEDICARE

## 2022-07-14 ENCOUNTER — APPOINTMENT (OUTPATIENT)
Dept: HEMATOLOGY ONCOLOGY | Facility: CLINIC | Age: 72
End: 2022-07-14

## 2022-07-14 VITALS
BODY MASS INDEX: 18.78 KG/M2 | HEART RATE: 82 BPM | SYSTOLIC BLOOD PRESSURE: 160 MMHG | DIASTOLIC BLOOD PRESSURE: 114 MMHG | WEIGHT: 142.38 LBS | OXYGEN SATURATION: 96 % | TEMPERATURE: 98.4 F

## 2022-07-14 DIAGNOSIS — D64.9 ANEMIA, UNSPECIFIED: ICD-10-CM

## 2022-07-14 DIAGNOSIS — C61 MALIGNANT NEOPLASM OF PROSTATE: ICD-10-CM

## 2022-07-14 LAB
BASOPHILS # BLD AUTO: 0.04 K/UL — SIGNIFICANT CHANGE UP (ref 0–0.2)
BASOPHILS NFR BLD AUTO: 0.4 % — SIGNIFICANT CHANGE UP (ref 0–2)
EOSINOPHIL # BLD AUTO: 0.08 K/UL — SIGNIFICANT CHANGE UP (ref 0–0.5)
EOSINOPHIL NFR BLD AUTO: 0.7 % — SIGNIFICANT CHANGE UP (ref 0–6)
HCT VFR BLD CALC: 44 % — SIGNIFICANT CHANGE UP (ref 39–50)
HGB BLD-MCNC: 15.3 G/DL — SIGNIFICANT CHANGE UP (ref 13–17)
IMM GRANULOCYTES NFR BLD AUTO: 0.3 % — SIGNIFICANT CHANGE UP (ref 0–1.5)
LYMPHOCYTES # BLD AUTO: 1.62 K/UL — SIGNIFICANT CHANGE UP (ref 1–3.3)
LYMPHOCYTES # BLD AUTO: 14.7 % — SIGNIFICANT CHANGE UP (ref 13–44)
MCHC RBC-ENTMCNC: 32.2 PG — SIGNIFICANT CHANGE UP (ref 27–34)
MCHC RBC-ENTMCNC: 34.8 GM/DL — SIGNIFICANT CHANGE UP (ref 32–36)
MCV RBC AUTO: 92.6 FL — SIGNIFICANT CHANGE UP (ref 80–100)
MONOCYTES # BLD AUTO: 0.56 K/UL — SIGNIFICANT CHANGE UP (ref 0–0.9)
MONOCYTES NFR BLD AUTO: 5.1 % — SIGNIFICANT CHANGE UP (ref 2–14)
NEUTROPHILS # BLD AUTO: 8.66 K/UL — HIGH (ref 1.8–7.4)
NEUTROPHILS NFR BLD AUTO: 78.8 % — HIGH (ref 43–77)
NRBC # BLD: 0 /100 WBCS — SIGNIFICANT CHANGE UP (ref 0–0)
PLATELET # BLD AUTO: 163 K/UL — SIGNIFICANT CHANGE UP (ref 150–400)
RBC # BLD: 4.75 M/UL — SIGNIFICANT CHANGE UP (ref 4.2–5.8)
RBC # FLD: 13.8 % — SIGNIFICANT CHANGE UP (ref 10.3–14.5)
WBC # BLD: 10.99 K/UL — HIGH (ref 3.8–10.5)
WBC # FLD AUTO: 10.99 K/UL — HIGH (ref 3.8–10.5)

## 2022-07-14 PROCEDURE — 99214 OFFICE O/P EST MOD 30 MIN: CPT

## 2022-07-14 NOTE — HISTORY OF PRESENT ILLNESS
[de-identified] : Referred by: Dr. Adrian Kc\par Diagnosis: very high risk locally advanced prostate cancer \par \par Mr. Poritllo presented at age 71 in February 2022 for evaluation of locally advanced prostate cancer. \par The patient has a medical history of active smoking. \par \par Presenting HPI: Sriram was sent in for evaluation for locally advanced prostate cancer after recently being seen by uro-oncologist Dr. Kc.  He initially presented to Dr. Kc with an elevated PSA. MRI pelvis on 12/9/21 revealed 1 PIRADS 5 lesion with bilateral neurovascular bundle involvement and bilateral seminal vesicle invasion, no pelvic adenopathy. He underwent prostate biopsy on 2/14/22 which revealed high risk prostate cancer in 13/13 cores, idania 8  (4+4) and 9 (4+5). He is currently taking tamsulosin and finasteride. He reports extreme urinary urgency and constipation. He is currently taking dulcolax daily but still having difficulty with bowel movements. He reports he has lost 30lb over the past 2 years 2/2 difficulty eating 2/2 constipation. His bone scan is scheduled and he is ordered for CT chest as well. He was referred to medical oncology and radiation oncology, Dr. Alvarado. \par \par Imaging: \par MRI pelvis 12/9/21: 1 PIRADS 5 lesion with bilateral neurovascular bundle involvement and bilateral seminal vesicle invasion, no pelvic adenopathy.\par NM bone scan 2/24/22: focal increased radiotracer activity in L femoral, R 3rd rib and sacrum, c/f osseous metastatic disease. \par \par Path: \par Prostate biopsy 2/1/22: prostate adenocarcinoma, Idania 8 (4+4) and 9 (4+5) in all cores, +PNI\par \par Genetics: not done\par \par HCM: \par - COVID vaccination: s/p booster January 2022\par - Colonoscopy: done 2 years ago, normal \par - Lung cancer screen: pending CT chest high res\par \par SH: \par - Occupation: previously worked in photo finishes \par - Living situation: lives in [place], his wife passed away, he is here with his sister and HCP today (Maylin) \par - Smoking/etoh/illicits: he actively smokes 10 cigarettes / day x many years, denies etoh, denies illicits \par - Exercise: minimally active \par \par FH: \par - No family history of cancer  [de-identified] : Sriram presents for follow up on 7/14/22 for prostate cancer. \par NM bone scan on 2/24/22 revealed focal increased radiotracer activity in L femoral, R 3rd rib and sacrum, c/f osseous metastatic disease. \par CT chest 4/12/22 revealed liver metastases, R 3rd rib mets, additional possible sclerotic mets, few indeterminate sub-4mm lung nodules. \par Lupron 3/8/22, 5/31/22, next dose 8/25/22. \par Abiraterone started 4/1/22. \par DEXA 6/6/22- normal bone density \par \par At today's visit he reports he continues to suffer from constipation. He is taking miralax and was having daily bowel movements but has not had one in a few days. He remains on lupron and abiraterone 1000mg daily + prednisone. He reports a good appetite- weight is generally stable. He is following with Dr. Alvarado and planned for RT but this was denied by insurance. Will bring over denial letter to Dr. Alvarado today. He continues to have mild fatigue and hot flashes. He denies fevers, chills, CP, SOB, n/v/d, LE edema. \par \par Laboratory studies reviewed at today's visit and notable for: WBC 10.99, Hb 15.3, Plt 163 \par PSA trend: 25.9 --> 14.6 --> 2.23 --> 0.21

## 2022-07-14 NOTE — PHYSICAL EXAM
[Ambulatory and capable of all self care but unable to carry out any work activities] : Status 2- Ambulatory and capable of all self care but unable to carry out any work activities. Up and about more than 50% of waking hours [Normal] : affect appropriate [de-identified] : thin male, pleasant, NAD

## 2022-07-15 LAB
PSA FREE FLD-MCNC: 24 %
PSA FREE SERPL-MCNC: 0.02 NG/ML
PSA SERPL-MCNC: 0.09 NG/ML
TESTOST SERPL-MCNC: <2.5 NG/DL

## 2022-08-02 ENCOUNTER — RESULT REVIEW (OUTPATIENT)
Age: 72
End: 2022-08-02

## 2022-08-02 ENCOUNTER — APPOINTMENT (OUTPATIENT)
Dept: HEMATOLOGY ONCOLOGY | Facility: CLINIC | Age: 72
End: 2022-08-02

## 2022-08-02 LAB
BASOPHILS # BLD AUTO: 0.03 K/UL — SIGNIFICANT CHANGE UP (ref 0–0.2)
BASOPHILS NFR BLD AUTO: 0.3 % — SIGNIFICANT CHANGE UP (ref 0–2)
EOSINOPHIL # BLD AUTO: 0.12 K/UL — SIGNIFICANT CHANGE UP (ref 0–0.5)
EOSINOPHIL NFR BLD AUTO: 1.3 % — SIGNIFICANT CHANGE UP (ref 0–6)
HCT VFR BLD CALC: 42.1 % — SIGNIFICANT CHANGE UP (ref 39–50)
HGB BLD-MCNC: 15.1 G/DL — SIGNIFICANT CHANGE UP (ref 13–17)
IMM GRANULOCYTES NFR BLD AUTO: 0.6 % — SIGNIFICANT CHANGE UP (ref 0–1.5)
LYMPHOCYTES # BLD AUTO: 1.13 K/UL — SIGNIFICANT CHANGE UP (ref 1–3.3)
LYMPHOCYTES # BLD AUTO: 12.2 % — LOW (ref 13–44)
MCHC RBC-ENTMCNC: 33.5 PG — SIGNIFICANT CHANGE UP (ref 27–34)
MCHC RBC-ENTMCNC: 35.9 GM/DL — SIGNIFICANT CHANGE UP (ref 32–36)
MCV RBC AUTO: 93.3 FL — SIGNIFICANT CHANGE UP (ref 80–100)
MONOCYTES # BLD AUTO: 0.56 K/UL — SIGNIFICANT CHANGE UP (ref 0–0.9)
MONOCYTES NFR BLD AUTO: 6 % — SIGNIFICANT CHANGE UP (ref 2–14)
NEUTROPHILS # BLD AUTO: 7.38 K/UL — SIGNIFICANT CHANGE UP (ref 1.8–7.4)
NEUTROPHILS NFR BLD AUTO: 79.6 % — HIGH (ref 43–77)
NRBC # BLD: 0 /100 WBCS — SIGNIFICANT CHANGE UP (ref 0–0)
PLATELET # BLD AUTO: 165 K/UL — SIGNIFICANT CHANGE UP (ref 150–400)
RBC # BLD: 4.51 M/UL — SIGNIFICANT CHANGE UP (ref 4.2–5.8)
RBC # FLD: 13.8 % — SIGNIFICANT CHANGE UP (ref 10.3–14.5)
WBC # BLD: 9.28 K/UL — SIGNIFICANT CHANGE UP (ref 3.8–10.5)
WBC # FLD AUTO: 9.28 K/UL — SIGNIFICANT CHANGE UP (ref 3.8–10.5)

## 2022-08-02 PROCEDURE — 85027 COMPLETE CBC AUTOMATED: CPT

## 2022-08-15 ENCOUNTER — RX RENEWAL (OUTPATIENT)
Age: 72
End: 2022-08-15

## 2022-08-22 ENCOUNTER — OUTPATIENT (OUTPATIENT)
Dept: OUTPATIENT SERVICES | Facility: HOSPITAL | Age: 72
LOS: 1 days | End: 2022-08-22
Payer: MEDICARE

## 2022-08-22 DIAGNOSIS — C61 MALIGNANT NEOPLASM OF PROSTATE: ICD-10-CM

## 2022-08-25 ENCOUNTER — RESULT REVIEW (OUTPATIENT)
Age: 72
End: 2022-08-25

## 2022-08-25 ENCOUNTER — APPOINTMENT (OUTPATIENT)
Dept: INFUSION THERAPY | Facility: CANCER CENTER | Age: 72
End: 2022-08-25

## 2022-08-25 ENCOUNTER — APPOINTMENT (OUTPATIENT)
Dept: HEMATOLOGY ONCOLOGY | Facility: CLINIC | Age: 72
End: 2022-08-25

## 2022-08-25 VITALS
TEMPERATURE: 98.2 F | BODY MASS INDEX: 18.69 KG/M2 | DIASTOLIC BLOOD PRESSURE: 76 MMHG | SYSTOLIC BLOOD PRESSURE: 146 MMHG | HEART RATE: 93 BPM | HEIGHT: 73 IN | OXYGEN SATURATION: 96 % | WEIGHT: 141 LBS

## 2022-08-25 LAB
ALBUMIN SERPL ELPH-MCNC: 4.2 G/DL — SIGNIFICANT CHANGE UP (ref 3.3–5)
ALP SERPL-CCNC: 97 U/L — SIGNIFICANT CHANGE UP (ref 40–120)
ALT FLD-CCNC: 15 U/L — SIGNIFICANT CHANGE UP (ref 10–45)
ANION GAP SERPL CALC-SCNC: 14 MMOL/L — SIGNIFICANT CHANGE UP (ref 5–17)
AST SERPL-CCNC: 18 U/L — SIGNIFICANT CHANGE UP (ref 10–40)
BASOPHILS # BLD AUTO: 0.05 K/UL — SIGNIFICANT CHANGE UP (ref 0–0.2)
BASOPHILS NFR BLD AUTO: 0.5 % — SIGNIFICANT CHANGE UP (ref 0–2)
BILIRUB SERPL-MCNC: 0.5 MG/DL — SIGNIFICANT CHANGE UP (ref 0.2–1.2)
BUN SERPL-MCNC: 27 MG/DL — HIGH (ref 7–23)
CALCIUM SERPL-MCNC: 9.6 MG/DL — SIGNIFICANT CHANGE UP (ref 8.4–10.5)
CHLORIDE SERPL-SCNC: 102 MMOL/L — SIGNIFICANT CHANGE UP (ref 96–108)
CO2 SERPL-SCNC: 26 MMOL/L — SIGNIFICANT CHANGE UP (ref 22–31)
CREAT SERPL-MCNC: 1.01 MG/DL — SIGNIFICANT CHANGE UP (ref 0.5–1.3)
EGFR: 80 ML/MIN/1.73M2 — SIGNIFICANT CHANGE UP
EOSINOPHIL # BLD AUTO: 0.15 K/UL — SIGNIFICANT CHANGE UP (ref 0–0.5)
EOSINOPHIL NFR BLD AUTO: 1.6 % — SIGNIFICANT CHANGE UP (ref 0–6)
GLUCOSE SERPL-MCNC: 160 MG/DL — HIGH (ref 70–99)
HCT VFR BLD CALC: 42.1 % — SIGNIFICANT CHANGE UP (ref 39–50)
HGB BLD-MCNC: 15.1 G/DL — SIGNIFICANT CHANGE UP (ref 13–17)
IMM GRANULOCYTES NFR BLD AUTO: 0.7 % — SIGNIFICANT CHANGE UP (ref 0–1.5)
LYMPHOCYTES # BLD AUTO: 0.8 K/UL — LOW (ref 1–3.3)
LYMPHOCYTES # BLD AUTO: 8.5 % — LOW (ref 13–44)
MCHC RBC-ENTMCNC: 33.2 PG — SIGNIFICANT CHANGE UP (ref 27–34)
MCHC RBC-ENTMCNC: 35.9 GM/DL — SIGNIFICANT CHANGE UP (ref 32–36)
MCV RBC AUTO: 92.5 FL — SIGNIFICANT CHANGE UP (ref 80–100)
MONOCYTES # BLD AUTO: 0.47 K/UL — SIGNIFICANT CHANGE UP (ref 0–0.9)
MONOCYTES NFR BLD AUTO: 5 % — SIGNIFICANT CHANGE UP (ref 2–14)
NEUTROPHILS # BLD AUTO: 7.83 K/UL — HIGH (ref 1.8–7.4)
NEUTROPHILS NFR BLD AUTO: 83.7 % — HIGH (ref 43–77)
NRBC # BLD: 0 /100 WBCS — SIGNIFICANT CHANGE UP (ref 0–0)
PLATELET # BLD AUTO: 169 K/UL — SIGNIFICANT CHANGE UP (ref 150–400)
POTASSIUM SERPL-MCNC: 4.1 MMOL/L — SIGNIFICANT CHANGE UP (ref 3.5–5.3)
POTASSIUM SERPL-SCNC: 4.1 MMOL/L — SIGNIFICANT CHANGE UP (ref 3.5–5.3)
PROT SERPL-MCNC: 6.7 G/DL — SIGNIFICANT CHANGE UP (ref 6–8.3)
RBC # BLD: 4.55 M/UL — SIGNIFICANT CHANGE UP (ref 4.2–5.8)
RBC # FLD: 14 % — SIGNIFICANT CHANGE UP (ref 10.3–14.5)
SODIUM SERPL-SCNC: 141 MMOL/L — SIGNIFICANT CHANGE UP (ref 135–145)
WBC # BLD: 9.37 K/UL — SIGNIFICANT CHANGE UP (ref 3.8–10.5)
WBC # FLD AUTO: 9.37 K/UL — SIGNIFICANT CHANGE UP (ref 3.8–10.5)

## 2022-08-25 PROCEDURE — 96402 CHEMO HORMON ANTINEOPL SQ/IM: CPT

## 2022-08-25 PROCEDURE — 84153 ASSAY OF PSA TOTAL: CPT

## 2022-08-25 PROCEDURE — 80053 COMPREHEN METABOLIC PANEL: CPT

## 2022-08-25 PROCEDURE — 99214 OFFICE O/P EST MOD 30 MIN: CPT

## 2022-08-25 PROCEDURE — 84154 ASSAY OF PSA FREE: CPT

## 2022-08-25 PROCEDURE — 85027 COMPLETE CBC AUTOMATED: CPT

## 2022-08-25 PROCEDURE — 84403 ASSAY OF TOTAL TESTOSTERONE: CPT

## 2022-08-25 PROCEDURE — 84402 ASSAY OF FREE TESTOSTERONE: CPT

## 2022-08-26 LAB
PSA FREE FLD-MCNC: 0.02 NG/ML — SIGNIFICANT CHANGE UP
PSA FREE FLD-MCNC: 24 % — SIGNIFICANT CHANGE UP
PSA SERPL-MCNC: 0.08 NG/ML — SIGNIFICANT CHANGE UP (ref 0–4)

## 2022-08-26 NOTE — PHYSICAL EXAM
[Ambulatory and capable of all self care but unable to carry out any work activities] : Status 2- Ambulatory and capable of all self care but unable to carry out any work activities. Up and about more than 50% of waking hours [Normal] : affect appropriate [de-identified] : thin male, pleasant, NAD

## 2022-08-26 NOTE — HISTORY OF PRESENT ILLNESS
[de-identified] : Referred by: Dr. Adrian Kc\par Diagnosis: very high risk locally advanced prostate cancer \par \par Mr. Portillo presented at age 71 in February 2022 for evaluation of locally advanced prostate cancer. \par The patient has a medical history of active smoking. \par \par Presenting HPI: Sriram was sent in for evaluation for locally advanced prostate cancer after recently being seen by uro-oncologist Dr. Kc.  He initially presented to Dr. Kc with an elevated PSA. MRI pelvis on 12/9/21 revealed 1 PIRADS 5 lesion with bilateral neurovascular bundle involvement and bilateral seminal vesicle invasion, no pelvic adenopathy. He underwent prostate biopsy on 2/14/22 which revealed high risk prostate cancer in 13/13 cores, idania 8  (4+4) and 9 (4+5). He is currently taking tamsulosin and finasteride. He reports extreme urinary urgency and constipation. He is currently taking dulcolax daily but still having difficulty with bowel movements. He reports he has lost 30lb over the past 2 years 2/2 difficulty eating 2/2 constipation. His bone scan is scheduled and he is ordered for CT chest as well. He was referred to medical oncology and radiation oncology, Dr. Alvarado. \par \par Imaging: \par MRI pelvis 12/9/21: 1 PIRADS 5 lesion with bilateral neurovascular bundle involvement and bilateral seminal vesicle invasion, no pelvic adenopathy.\par NM bone scan 2/24/22: focal increased radiotracer activity in L femoral, R 3rd rib and sacrum, c/f osseous metastatic disease. \par \par Path: \par Prostate biopsy 2/1/22: prostate adenocarcinoma, Idania 8 (4+4) and 9 (4+5) in all cores, +PNI\par \par Genetics: not done\par \par HCM: \par - COVID vaccination: s/p booster January 2022\par - Colonoscopy: done 2 years ago, normal \par - Lung cancer screen: pending CT chest high res\par \par SH: \par - Occupation: previously worked in photo finishes \par - Living situation: lives in [place], his wife passed away, he is here with his sister and HCP today (Maylin) \par - Smoking/etoh/illicits: he actively smokes 10 cigarettes / day x many years, denies etoh, denies illicits \par - Exercise: minimally active \par \par FH: \par - No family history of cancer  [de-identified] : Sriram presents for follow up on 8/25/22 for metastatic prostate cancer. \par NM bone scan on 2/24/22 revealed focal increased radiotracer activity in L femoral, R 3rd rib and sacrum, c/f osseous metastatic disease. \par CT chest 4/12/22 revealed liver metastases, R 3rd rib mets, additional possible sclerotic mets, few indeterminate sub-4mm lung nodules. \par Lupron 3/8/22, 5/31/22, next dose 8/25/22. \par Abiraterone started 4/1/22. \par DEXA 6/6/22- normal bone density \par \par At today's visit he reports he continues to suffer from constipation. He completed 15 treatments of RT to the prostate with Dr. Alvarado. He remains on lupron and abiraterone 1000mg daily + prednisone. He reports a good appetite- weight is generally stable. He continues to have mild fatigue and hot flashes. He denies fevers, chills, CP, SOB, n/v/d, LE edema. \par \par Laboratory studies reviewed at today's visit and notable for: WBC 9.37, Hb 15.1, Plt 169\par PSA trend: 25.9 --> 14.6 --> 2.23 --> 0.21 --> 0.09 --> 0.08

## 2022-08-27 LAB — TESTOST FREE+TOTAL PANEL SERPL-MCNC: <2.5 NG/DL — LOW (ref 193–740)

## 2022-08-29 LAB — TESTOST FREE SERPL-MCNC: 0.1 PG/ML — LOW (ref 5.9–27)

## 2022-11-14 ENCOUNTER — OUTPATIENT (OUTPATIENT)
Dept: OUTPATIENT SERVICES | Facility: HOSPITAL | Age: 72
LOS: 1 days | End: 2022-11-14
Payer: MEDICARE

## 2022-11-14 DIAGNOSIS — C61 MALIGNANT NEOPLASM OF PROSTATE: ICD-10-CM

## 2022-11-17 ENCOUNTER — RESULT REVIEW (OUTPATIENT)
Age: 72
End: 2022-11-17

## 2022-11-17 ENCOUNTER — APPOINTMENT (OUTPATIENT)
Dept: HEMATOLOGY ONCOLOGY | Facility: CLINIC | Age: 72
End: 2022-11-17

## 2022-11-17 ENCOUNTER — APPOINTMENT (OUTPATIENT)
Dept: INFUSION THERAPY | Facility: CANCER CENTER | Age: 72
End: 2022-11-17

## 2022-11-17 ENCOUNTER — LABORATORY RESULT (OUTPATIENT)
Age: 72
End: 2022-11-17

## 2022-11-17 VITALS
SYSTOLIC BLOOD PRESSURE: 160 MMHG | HEART RATE: 90 BPM | OXYGEN SATURATION: 99 % | HEIGHT: 73 IN | TEMPERATURE: 97.5 F | BODY MASS INDEX: 19.27 KG/M2 | WEIGHT: 145.39 LBS | DIASTOLIC BLOOD PRESSURE: 82 MMHG

## 2022-11-17 LAB
BASOPHILS # BLD AUTO: 0.03 K/UL — SIGNIFICANT CHANGE UP (ref 0–0.2)
BASOPHILS NFR BLD AUTO: 0.3 % — SIGNIFICANT CHANGE UP (ref 0–2)
EOSINOPHIL # BLD AUTO: 0.1 K/UL — SIGNIFICANT CHANGE UP (ref 0–0.5)
EOSINOPHIL NFR BLD AUTO: 1.1 % — SIGNIFICANT CHANGE UP (ref 0–6)
HCT VFR BLD CALC: 44 % — SIGNIFICANT CHANGE UP (ref 39–50)
HGB BLD-MCNC: 15.4 G/DL — SIGNIFICANT CHANGE UP (ref 13–17)
IMM GRANULOCYTES NFR BLD AUTO: 0.5 % — SIGNIFICANT CHANGE UP (ref 0–0.9)
LYMPHOCYTES # BLD AUTO: 1.1 K/UL — SIGNIFICANT CHANGE UP (ref 1–3.3)
LYMPHOCYTES # BLD AUTO: 12.5 % — LOW (ref 13–44)
MCHC RBC-ENTMCNC: 32.8 PG — SIGNIFICANT CHANGE UP (ref 27–34)
MCHC RBC-ENTMCNC: 35 GM/DL — SIGNIFICANT CHANGE UP (ref 32–36)
MCV RBC AUTO: 93.6 FL — SIGNIFICANT CHANGE UP (ref 80–100)
MONOCYTES # BLD AUTO: 0.51 K/UL — SIGNIFICANT CHANGE UP (ref 0–0.9)
MONOCYTES NFR BLD AUTO: 5.8 % — SIGNIFICANT CHANGE UP (ref 2–14)
NEUTROPHILS # BLD AUTO: 7.03 K/UL — SIGNIFICANT CHANGE UP (ref 1.8–7.4)
NEUTROPHILS NFR BLD AUTO: 79.8 % — HIGH (ref 43–77)
NRBC # BLD: 0 /100 WBCS — SIGNIFICANT CHANGE UP (ref 0–0)
PLATELET # BLD AUTO: 188 K/UL — SIGNIFICANT CHANGE UP (ref 150–400)
RBC # BLD: 4.7 M/UL — SIGNIFICANT CHANGE UP (ref 4.2–5.8)
RBC # FLD: 14.4 % — SIGNIFICANT CHANGE UP (ref 10.3–14.5)
WBC # BLD: 8.81 K/UL — SIGNIFICANT CHANGE UP (ref 3.8–10.5)
WBC # FLD AUTO: 8.81 K/UL — SIGNIFICANT CHANGE UP (ref 3.8–10.5)

## 2022-11-17 PROCEDURE — 36415 COLL VENOUS BLD VENIPUNCTURE: CPT

## 2022-11-17 PROCEDURE — 99214 OFFICE O/P EST MOD 30 MIN: CPT

## 2022-11-17 PROCEDURE — 85027 COMPLETE CBC AUTOMATED: CPT

## 2022-11-17 PROCEDURE — 96402 CHEMO HORMON ANTINEOPL SQ/IM: CPT

## 2022-11-20 NOTE — HISTORY OF PRESENT ILLNESS
[de-identified] : Referred by: Dr. Adrian Kc\par Diagnosis: very high risk locally advanced prostate cancer \par \par Mr. Portillo initially presented at age 71 in February 2022 for evaluation of locally advanced prostate cancer. \par The patient has a medical history of active smoking. \par \par HPI: Sriram was referred for evaluation for locally advanced prostate cancer after being seen by uro-oncologist Dr. Kc.  He initially presented to Dr. Kc with an elevated PSA. MRI pelvis on 12/9/21 revealed 1 PIRADS 5 lesion with bilateral neurovascular bundle involvement and bilateral seminal vesicle invasion, no pelvic adenopathy. He underwent prostate biopsy on 2/14/22 which revealed high risk prostate cancer in 13/13 cores, idania 8  (4+4) and 9 (4+5). He was currently taking tamsulosin and finasteride. He was experiencing extreme urinary urgency and constipation. He had lost 30lb over the past 2 years 2/2 difficulty eating 2/2 constipation.  He was referred to medical oncology and radiation oncology, Dr. Alvarado. \par \par NM bone scan on 2/24/22 revealed focal increased radiotracer activity in L femoral, R 3rd rib and sacrum, c/f osseous metastatic disease. \par CT chest 4/12/22 revealed liver metastases, R 3rd rib mets, additional possible sclerotic mets, few indeterminate sub-4mm lung nodules. \par Lupron 3/8/22, 5/31/22, next dose 8/25/22. \par Abiraterone with Prednisone started 4/1/22. \par \par Imaging: \par MRI pelvis 12/9/21: 1 PIRADS 5 lesion with bilateral neurovascular bundle involvement and bilateral seminal vesicle invasion, no pelvic adenopathy.\par NM bone scan 2/24/22: focal increased radiotracer activity in L femoral, R 3rd rib and sacrum, c/f osseous metastatic disease. \par \par Path: \par Prostate biopsy 2/1/22: prostate adenocarcinoma, [place] 8 (4+4) and 9 (4+5) in all cores, +PNI\par \par Genetics: not done\par \par HCM: \par - COVID vaccination: s/p booster January 2022\par - Colonoscopy:  2020 ?, wnl\par - Lung cancer screen:  CT chest 4/12/22 - small indeterminate sub-4 mm lung nodules\par - DEXA 6/6/22- normal bone density \par \par SH: \par - Occupation: previously worked in photo finishes \par - Living situation: lives in [place], his wife passed away, presented with his sister and HCP (Maylin) \par - Smoking/etoh/illicits: he actively smokes 10 cigarettes / day x many years, denies etoh, denies illicits \par - Exercise: minimally active \par \par FH: \par - No family history of cancer  [de-identified] : Sriram presents for follow up on 11/17/22 for metastatic prostate cancer. \par \par At today's visit he reports he is feeling "a little better".  Urinary symptoms continue to improve.  No longer with dysuria, nocturia decreased to just 2-3 times/night.  No bone pain.  Constipation also slightly improved.  Appetite "very good".  Has gained 2-3 lbs, was hoping for more.   Previously completed 15 treatments of RT to the prostate with Dr. Alvarado. He remains on lupron and abiraterone 1000mg daily + prednisone. Hot flashes not bothersome. He denies fevers, chills, CP, SOB, n/v/d, LE edema. \par \par Laboratory studies reviewed at today's visit and notable for: WBC 8.81, Hb 15.4, Plt 188\par PSA trend: 25.9 --> 14.6 --> 2.23 --> 0.21 --> 0.09 --> 0.08

## 2022-11-20 NOTE — RESULTS/DATA
[FreeTextEntry1] : Mr. Portillo initially presented at age 71 in February 2022 for evaluation of locally advanced prostate cancer. \par The patient has a medical history of active smoking. \par \par Bone scan concerning for osseous metastatic disease. Patient remains asymptomatic and without bone pain. \par PSMA PET CT denied by insurance. CT chest revealed liver mets, bone mets. Small pulmonary nodules. \par Continue lupron next due 2/16/23. Started on abiraterone/pred. PSA responding nicely. \par Continue to follow up with Dr. Alvarado from RT (s/p RT to prostate bed) and Dr. Kc from urologic oncology. \par Consider addition of docetaxel in the event of progression if PS improves. \par \par PSA 0.06 on 11/17/22.

## 2022-11-20 NOTE — PHYSICAL EXAM
[Ambulatory and capable of all self care but unable to carry out any work activities] : Status 2- Ambulatory and capable of all self care but unable to carry out any work activities. Up and about more than 50% of waking hours [Normal] : affect appropriate [de-identified] : thin male, pleasant/cooperative, NAD

## 2022-11-21 LAB
ALBUMIN SERPL ELPH-MCNC: 4.3 G/DL
ALP BLD-CCNC: 106 U/L
ALT SERPL-CCNC: 16 U/L
ANION GAP SERPL CALC-SCNC: 12 MMOL/L
AST SERPL-CCNC: 19 U/L
BILIRUB SERPL-MCNC: 0.4 MG/DL
BUN SERPL-MCNC: 20 MG/DL
CALCIUM SERPL-MCNC: 9.6 MG/DL
CHLORIDE SERPL-SCNC: 100 MMOL/L
CO2 SERPL-SCNC: 28 MMOL/L
CREAT SERPL-MCNC: 0.98 MG/DL
EGFR: 82 ML/MIN/1.73M2
GLUCOSE SERPL-MCNC: 146 MG/DL
POTASSIUM SERPL-SCNC: 4.2 MMOL/L
PROT SERPL-MCNC: 6.9 G/DL
PSA FREE FLD-MCNC: NORMAL %
PSA FREE SERPL-MCNC: <0.01 NG/ML
PSA SERPL-MCNC: 0.06 NG/ML
SODIUM SERPL-SCNC: 140 MMOL/L

## 2022-11-23 RX ORDER — BICALUTAMIDE 50 MG/1
50 TABLET ORAL DAILY
Qty: 30 | Refills: 4 | Status: DISCONTINUED | COMMUNITY
Start: 2022-02-18 | End: 2022-11-23

## 2023-02-11 ENCOUNTER — OUTPATIENT (OUTPATIENT)
Dept: OUTPATIENT SERVICES | Facility: HOSPITAL | Age: 73
LOS: 1 days | End: 2023-02-11
Payer: MEDICARE

## 2023-02-11 DIAGNOSIS — C61 MALIGNANT NEOPLASM OF PROSTATE: ICD-10-CM

## 2023-02-15 ENCOUNTER — APPOINTMENT (OUTPATIENT)
Dept: INFUSION THERAPY | Facility: CANCER CENTER | Age: 73
End: 2023-02-15

## 2023-02-16 ENCOUNTER — APPOINTMENT (OUTPATIENT)
Dept: INFUSION THERAPY | Facility: CANCER CENTER | Age: 73
End: 2023-02-16

## 2023-02-16 ENCOUNTER — RX RENEWAL (OUTPATIENT)
Age: 73
End: 2023-02-16

## 2023-02-16 ENCOUNTER — APPOINTMENT (OUTPATIENT)
Dept: HEMATOLOGY ONCOLOGY | Facility: CLINIC | Age: 73
End: 2023-02-16
Payer: MEDICARE

## 2023-02-16 ENCOUNTER — RESULT REVIEW (OUTPATIENT)
Age: 73
End: 2023-02-16

## 2023-02-16 VITALS
WEIGHT: 144.4 LBS | DIASTOLIC BLOOD PRESSURE: 71 MMHG | HEART RATE: 93 BPM | BODY MASS INDEX: 19.14 KG/M2 | OXYGEN SATURATION: 97 % | HEIGHT: 73 IN | TEMPERATURE: 98.4 F | SYSTOLIC BLOOD PRESSURE: 159 MMHG

## 2023-02-16 DIAGNOSIS — K59.00 CONSTIPATION, UNSPECIFIED: ICD-10-CM

## 2023-02-16 LAB
BASOPHILS # BLD AUTO: 0.04 K/UL — SIGNIFICANT CHANGE UP (ref 0–0.2)
BASOPHILS NFR BLD AUTO: 0.4 % — SIGNIFICANT CHANGE UP (ref 0–2)
EOSINOPHIL # BLD AUTO: 0.24 K/UL — SIGNIFICANT CHANGE UP (ref 0–0.5)
EOSINOPHIL NFR BLD AUTO: 2.4 % — SIGNIFICANT CHANGE UP (ref 0–6)
HCT VFR BLD CALC: 44.3 % — SIGNIFICANT CHANGE UP (ref 39–50)
HGB BLD-MCNC: 15.1 G/DL — SIGNIFICANT CHANGE UP (ref 13–17)
IMM GRANULOCYTES NFR BLD AUTO: 0.4 % — SIGNIFICANT CHANGE UP (ref 0–0.9)
LYMPHOCYTES # BLD AUTO: 1.26 K/UL — SIGNIFICANT CHANGE UP (ref 1–3.3)
LYMPHOCYTES # BLD AUTO: 12.6 % — LOW (ref 13–44)
MCHC RBC-ENTMCNC: 31.4 PG — SIGNIFICANT CHANGE UP (ref 27–34)
MCHC RBC-ENTMCNC: 34.1 GM/DL — SIGNIFICANT CHANGE UP (ref 32–36)
MCV RBC AUTO: 92.1 FL — SIGNIFICANT CHANGE UP (ref 80–100)
MONOCYTES # BLD AUTO: 0.59 K/UL — SIGNIFICANT CHANGE UP (ref 0–0.9)
MONOCYTES NFR BLD AUTO: 5.9 % — SIGNIFICANT CHANGE UP (ref 2–14)
NEUTROPHILS # BLD AUTO: 7.82 K/UL — HIGH (ref 1.8–7.4)
NEUTROPHILS NFR BLD AUTO: 78.3 % — HIGH (ref 43–77)
NRBC # BLD: 0 /100 WBCS — SIGNIFICANT CHANGE UP (ref 0–0)
PLATELET # BLD AUTO: 171 K/UL — SIGNIFICANT CHANGE UP (ref 150–400)
RBC # BLD: 4.81 M/UL — SIGNIFICANT CHANGE UP (ref 4.2–5.8)
RBC # FLD: 14.7 % — HIGH (ref 10.3–14.5)
WBC # BLD: 9.99 K/UL — SIGNIFICANT CHANGE UP (ref 3.8–10.5)
WBC # FLD AUTO: 9.99 K/UL — SIGNIFICANT CHANGE UP (ref 3.8–10.5)

## 2023-02-16 PROCEDURE — 85027 COMPLETE CBC AUTOMATED: CPT

## 2023-02-16 PROCEDURE — 99214 OFFICE O/P EST MOD 30 MIN: CPT

## 2023-02-16 PROCEDURE — 96402 CHEMO HORMON ANTINEOPL SQ/IM: CPT

## 2023-02-16 NOTE — HISTORY OF PRESENT ILLNESS
[de-identified] : Referred by: Dr. Adrian Kc\par Diagnosis: very high risk locally advanced prostate cancer \par \par Mr. Portillo initially presented at age 71 in February 2022 for evaluation of locally advanced prostate cancer. \par The patient has a medical history of active smoking. \par \par HPI: Sriram was referred for evaluation for locally advanced prostate cancer after being seen by uro-oncologist Dr. Kc.  He initially presented to Dr. Kc with an elevated PSA. MRI pelvis on 12/9/21 revealed 1 PIRADS 5 lesion with bilateral neurovascular bundle involvement and bilateral seminal vesicle invasion, no pelvic adenopathy. He underwent prostate biopsy on 2/14/22 which revealed high risk prostate cancer in 13/13 cores, idania 8  (4+4) and 9 (4+5). He was currently taking tamsulosin and finasteride. He was experiencing extreme urinary urgency and constipation. He had lost 30lb over the past 2 years 2/2 difficulty eating 2/2 constipation.  He was referred to medical oncology and radiation oncology, Dr. Alvarado. \par \par NM bone scan on 2/24/22 revealed focal increased radiotracer activity in L femoral, R 3rd rib and sacrum, c/f osseous metastatic disease. \par CT chest 4/12/22 revealed liver metastases, R 3rd rib mets, additional possible sclerotic mets, few indeterminate sub-4mm lung nodules. \par Lupron 3/8/22, 5/31/22, next dose 8/25/22. \par Abiraterone with Prednisone started 4/1/22. \par \par Imaging: \par MRI pelvis 12/9/21: 1 PIRADS 5 lesion with bilateral neurovascular bundle involvement and bilateral seminal vesicle invasion, no pelvic adenopathy.\par NM bone scan 2/24/22: focal increased radiotracer activity in L femoral, R 3rd rib and sacrum, c/f osseous metastatic disease. \par \par Path: \par Prostate biopsy 2/1/22: prostate adenocarcinoma, [place] 8 (4+4) and 9 (4+5) in all cores, +PNI\par \par Genetics: not done\par \par HCM: \par - COVID vaccination: s/p booster January 2022\par - Colonoscopy:  2020 ?, wnl\par - Lung cancer screen:  CT chest 4/12/22 - small indeterminate sub-4 mm lung nodules\par - DEXA 6/6/22- normal bone density \par \par SH: \par - Occupation: previously worked in photo finishes \par - Living situation: lives in [place], his wife passed away, presented with his sister and HCP (Maylin) \par - Smoking/etoh/illicits: he actively smokes 10 cigarettes / day x many years, denies etoh, denies illicits \par - Exercise: minimally active \par \par FH: \par - No family history of cancer  [de-identified] : Sriram presents for follow up on 2/16/23 for metastatic prostate cancer. \par \par At today's visit he reports he is feeling "a little better".  Recovered from Covid infection last month.  Urinary symptoms continue to improve.  No longer with dysuria, nocturia decreased to just 2-3 times/night.  No bone pain.  Constipation persists - no abdominal pain, feels distended at times.  Appetite stable, did not eat x 4 days during Covid.  Remains physically inactive c/w history.  Notes some balance issues. Previously completed 15 treatments of RT to the prostate with Dr. Alvarado. He remains on lupron and abiraterone 1000mg daily + prednisone. Hot flashes not bothersome. He denies headache, visual changes, dizziness, fevers, chills, CP, SOB, n/v/d, LE edema. \par \par Laboratory studies reviewed at today's visit and notable for: WBC 9.99, Hb 15.1, Plt 171\par PSA trend: 25.9 --> 14.6 --> 2.23 --> 0.21 --> 0.09 --> 0.08 -> 0.06

## 2023-02-16 NOTE — RESULTS/DATA
[FreeTextEntry1] : Mr. Portillo initially presented at age 71 in February 2022 for evaluation of locally advanced prostate cancer. \par The patient has a medical history of active smoking. \par \par Bone scan concerning for osseous metastatic disease. Patient remains asymptomatic and without bone pain. \par PSMA PET CT denied by insurance. CT chest revealed liver mets, bone mets. Small pulmonary nodules. \par Continue lupron next due 5/11/23. Started on abiraterone/pred. PSA responding nicely. \par Continue to follow up with Dr. Alvarado from RT (s/p RT to prostate bed) and Dr. Kc from urologic oncology. \par Consider addition of docetaxel in the event of progression if PS improves. \par \par PSA 0.06 on 11/17/22.

## 2023-02-16 NOTE — PHYSICAL EXAM
[Ambulatory and capable of all self care but unable to carry out any work activities] : Status 2- Ambulatory and capable of all self care but unable to carry out any work activities. Up and about more than 50% of waking hours [Normal] : affect appropriate [de-identified] : thin male, pleasant/cooperative, NAD [de-identified] : Soft, non-tender, non-distended

## 2023-02-17 LAB
ALBUMIN SERPL ELPH-MCNC: 4.2 G/DL
ALP BLD-CCNC: 88 U/L
ALT SERPL-CCNC: 18 U/L
ANION GAP SERPL CALC-SCNC: 15 MMOL/L
AST SERPL-CCNC: 23 U/L
BILIRUB SERPL-MCNC: 0.7 MG/DL
BUN SERPL-MCNC: 23 MG/DL
CALCIUM SERPL-MCNC: 9.8 MG/DL
CHLORIDE SERPL-SCNC: 102 MMOL/L
CO2 SERPL-SCNC: 26 MMOL/L
CREAT SERPL-MCNC: 0.94 MG/DL
EGFR: 87 ML/MIN/1.73M2
GLUCOSE SERPL-MCNC: 110 MG/DL
POTASSIUM SERPL-SCNC: 4.3 MMOL/L
PROT SERPL-MCNC: 6.9 G/DL
PSA FREE FLD-MCNC: 25 %
PSA FREE SERPL-MCNC: 0.13 NG/ML
PSA SERPL-MCNC: 0.5 NG/ML
SODIUM SERPL-SCNC: 143 MMOL/L

## 2023-02-24 LAB
TESTOST FREE SERPL-MCNC: 0.2 PG/ML
TESTOST SERPL-MCNC: <2.5 NG/DL

## 2023-05-09 ENCOUNTER — OUTPATIENT (OUTPATIENT)
Dept: OUTPATIENT SERVICES | Facility: HOSPITAL | Age: 73
LOS: 1 days | End: 2023-05-09
Payer: MEDICARE

## 2023-05-09 DIAGNOSIS — C61 MALIGNANT NEOPLASM OF PROSTATE: ICD-10-CM

## 2023-05-15 ENCOUNTER — NON-APPOINTMENT (OUTPATIENT)
Age: 73
End: 2023-05-15

## 2023-05-18 ENCOUNTER — RESULT REVIEW (OUTPATIENT)
Age: 73
End: 2023-05-18

## 2023-05-18 ENCOUNTER — LABORATORY RESULT (OUTPATIENT)
Age: 73
End: 2023-05-18

## 2023-05-18 ENCOUNTER — APPOINTMENT (OUTPATIENT)
Dept: HEMATOLOGY ONCOLOGY | Facility: CLINIC | Age: 73
End: 2023-05-18
Payer: MEDICARE

## 2023-05-18 ENCOUNTER — APPOINTMENT (OUTPATIENT)
Dept: INFUSION THERAPY | Facility: CANCER CENTER | Age: 73
End: 2023-05-18

## 2023-05-18 VITALS
SYSTOLIC BLOOD PRESSURE: 193 MMHG | HEIGHT: 73 IN | BODY MASS INDEX: 19.43 KG/M2 | TEMPERATURE: 98.3 F | OXYGEN SATURATION: 97 % | WEIGHT: 146.6 LBS | DIASTOLIC BLOOD PRESSURE: 100 MMHG | HEART RATE: 93 BPM

## 2023-05-18 LAB
BASOPHILS # BLD AUTO: 0.03 K/UL — SIGNIFICANT CHANGE UP (ref 0–0.2)
BASOPHILS NFR BLD AUTO: 0.3 % — SIGNIFICANT CHANGE UP (ref 0–2)
EOSINOPHIL # BLD AUTO: 0.16 K/UL — SIGNIFICANT CHANGE UP (ref 0–0.5)
EOSINOPHIL NFR BLD AUTO: 1.7 % — SIGNIFICANT CHANGE UP (ref 0–6)
HCT VFR BLD CALC: 46 % — SIGNIFICANT CHANGE UP (ref 39–50)
HGB BLD-MCNC: 15.6 G/DL — SIGNIFICANT CHANGE UP (ref 13–17)
IMM GRANULOCYTES NFR BLD AUTO: 0.3 % — SIGNIFICANT CHANGE UP (ref 0–0.9)
LYMPHOCYTES # BLD AUTO: 1.31 K/UL — SIGNIFICANT CHANGE UP (ref 1–3.3)
LYMPHOCYTES # BLD AUTO: 13.6 % — SIGNIFICANT CHANGE UP (ref 13–44)
MCHC RBC-ENTMCNC: 31.2 PG — SIGNIFICANT CHANGE UP (ref 27–34)
MCHC RBC-ENTMCNC: 33.9 GM/DL — SIGNIFICANT CHANGE UP (ref 32–36)
MCV RBC AUTO: 92 FL — SIGNIFICANT CHANGE UP (ref 80–100)
MONOCYTES # BLD AUTO: 0.53 K/UL — SIGNIFICANT CHANGE UP (ref 0–0.9)
MONOCYTES NFR BLD AUTO: 5.5 % — SIGNIFICANT CHANGE UP (ref 2–14)
NEUTROPHILS # BLD AUTO: 7.58 K/UL — HIGH (ref 1.8–7.4)
NEUTROPHILS NFR BLD AUTO: 78.6 % — HIGH (ref 43–77)
NRBC # BLD: 0 /100 WBCS — SIGNIFICANT CHANGE UP (ref 0–0)
PLATELET # BLD AUTO: 193 K/UL — SIGNIFICANT CHANGE UP (ref 150–400)
RBC # BLD: 5 M/UL — SIGNIFICANT CHANGE UP (ref 4.2–5.8)
RBC # FLD: 14.3 % — SIGNIFICANT CHANGE UP (ref 10.3–14.5)
WBC # BLD: 9.64 K/UL — SIGNIFICANT CHANGE UP (ref 3.8–10.5)
WBC # FLD AUTO: 9.64 K/UL — SIGNIFICANT CHANGE UP (ref 3.8–10.5)

## 2023-05-18 PROCEDURE — 99214 OFFICE O/P EST MOD 30 MIN: CPT

## 2023-05-18 RX ORDER — DIAZEPAM 5 MG/1
5 TABLET ORAL
Qty: 1 | Refills: 0 | Status: DISCONTINUED | COMMUNITY
Start: 2021-12-27 | End: 2023-05-18

## 2023-05-18 NOTE — RESULTS/DATA
[FreeTextEntry1] : Mr. Portillo initially presented at age 71 in February 2022 for evaluation of locally advanced prostate cancer. \par The patient has a medical history of active smoking. \par \par Bone scan concerning for osseous metastatic disease. Patient remains asymptomatic and without bone pain. \par PSMA PET CT denied by insurance. CT chest revealed liver mets, bone mets. Small pulmonary nodules. \par Continue eligard q 3 months. Started on abiraterone/pred. PSA responding nicely. \par Continue to follow up with Dr. Alvarado from RT (s/p RT to prostate bed) and Dr. Kc from urologic oncology. \par Consider addition of docetaxel in the event of progression if PS improves. \par PSA increased to 0.50 at last visit- will continue to monitor. May need to change treatment or add docetaxel. \par RTC 3 months or sooner PRN.

## 2023-05-18 NOTE — PHYSICAL EXAM
[Ambulatory and capable of all self care but unable to carry out any work activities] : Status 2- Ambulatory and capable of all self care but unable to carry out any work activities. Up and about more than 50% of waking hours [Normal] : affect appropriate [de-identified] : thin male, pleasant, NAD

## 2023-05-18 NOTE — HISTORY OF PRESENT ILLNESS
[de-identified] : Referred by: Dr. Adrian Kc\par Diagnosis: very high risk locally advanced prostate cancer \par \par Mr. Portillo presented at age 71 in February 2022 for evaluation of locally advanced prostate cancer. \par The patient has a medical history of active smoking. \par \par Presenting HPI: Sriram was sent in for evaluation for locally advanced prostate cancer after recently being seen by uro-oncologist Dr. Kc.  He initially presented to Dr. Kc with an elevated PSA. MRI pelvis on 12/9/21 revealed 1 PIRADS 5 lesion with bilateral neurovascular bundle involvement and bilateral seminal vesicle invasion, no pelvic adenopathy. He underwent prostate biopsy on 2/14/22 which revealed high risk prostate cancer in 13/13 cores, idania 8  (4+4) and 9 (4+5). He is currently taking tamsulosin and finasteride. He reports extreme urinary urgency and constipation. He is currently taking dulcolax daily but still having difficulty with bowel movements. He reports he has lost 30lb over the past 2 years 2/2 difficulty eating 2/2 constipation. His bone scan is scheduled and he is ordered for CT chest as well. He was referred to medical oncology and radiation oncology, Dr. Alvarado. \par \par Imaging: \par MRI pelvis 12/9/21: 1 PIRADS 5 lesion with bilateral neurovascular bundle involvement and bilateral seminal vesicle invasion, no pelvic adenopathy.\par NM bone scan 2/24/22: focal increased radiotracer activity in L femoral, R 3rd rib and sacrum, c/f osseous metastatic disease. \par CT chest 4/12/22:  liver metastases, R 3rd rib mets, additional possible sclerotic mets, few indeterminate sub-4mm lung nodules. \par \par Path: \par Prostate biopsy 2/1/22: prostate adenocarcinoma, [place] 8 (4+4) and 9 (4+5) in all cores, +PNI\par \par Genetics: not done\par \par HCM: \par - COVID vaccination: s/p booster January 2022\par - Colonoscopy: done 2 years ago, normal \par - Lung cancer screen: pending CT chest high res\par \par SH: \par - Occupation: previously worked in photo finishes \par - Living situation: lives in [place], his wife passed away, he is here with his sister and HCP today (Maylin) \par - Smoking/etoh/illicits: he actively smokes 10 cigarettes / day x many years, denies etoh, denies illicits \par - Exercise: minimally active \par \par FH: \par - No family history of cancer  [de-identified] : Sriram presents for follow up on 5/18/23 for metastatic prostate cancer. \par Lupron/eligard initiated 3/2022. \par Abiraterone started 4/1/22. \par DEXA 6/6/22- normal bone density \par \par At today's visit he states he is generally feeling well. He does report feeling "lazy" and does not want to do much. He feels he may be depressed but is reluctant to start any medications or to speak with social work/therapist. His constipation is somewhat improved - he has not had to take as many laxatives recently. He remains on abiraterone 1000mg daily + prednisone. He received eligard injection today. Reports mild hot flashes which are tolerable. He still wakes up 4-5x per night to urinate. He has not seen Dr. Kc in over a year. He denies headache, visual changes, dizziness, fevers, chills, CP, SOB, n/v/d, LE edema. \par \par PSA increased to 0.50 at last visit- continue to monitor \par \par Laboratory studies reviewed at today's visit and notable for: WBC 9.64, Hb 15.6, Plt 193\par PSA trend: 25.9 --> 14.6 --> 2.23 --> 0.21 --> 0.09 --> 0.08 -> 0.06 --> 0.50.

## 2023-06-12 ENCOUNTER — NON-APPOINTMENT (OUTPATIENT)
Age: 73
End: 2023-06-12

## 2023-06-13 ENCOUNTER — APPOINTMENT (OUTPATIENT)
Dept: NUCLEAR MEDICINE | Facility: CLINIC | Age: 73
End: 2023-06-13
Payer: MEDICARE

## 2023-06-13 ENCOUNTER — OUTPATIENT (OUTPATIENT)
Dept: OUTPATIENT SERVICES | Facility: HOSPITAL | Age: 73
LOS: 1 days | End: 2023-06-13

## 2023-06-13 DIAGNOSIS — C61 MALIGNANT NEOPLASM OF PROSTATE: ICD-10-CM

## 2023-06-13 PROCEDURE — 78816 PET IMAGE W/CT FULL BODY: CPT | Mod: 26

## 2023-06-22 ENCOUNTER — APPOINTMENT (OUTPATIENT)
Dept: HEMATOLOGY ONCOLOGY | Facility: CLINIC | Age: 73
End: 2023-06-22
Payer: MEDICARE

## 2023-06-22 ENCOUNTER — RESULT REVIEW (OUTPATIENT)
Age: 73
End: 2023-06-22

## 2023-06-22 VITALS
BODY MASS INDEX: 19.61 KG/M2 | DIASTOLIC BLOOD PRESSURE: 99 MMHG | WEIGHT: 148 LBS | HEIGHT: 73 IN | TEMPERATURE: 97.9 F | OXYGEN SATURATION: 98 % | HEART RATE: 75 BPM | SYSTOLIC BLOOD PRESSURE: 175 MMHG

## 2023-06-22 LAB
BASOPHILS # BLD AUTO: 0.03 K/UL — SIGNIFICANT CHANGE UP (ref 0–0.2)
BASOPHILS NFR BLD AUTO: 0.3 % — SIGNIFICANT CHANGE UP (ref 0–2)
EOSINOPHIL # BLD AUTO: 0.15 K/UL — SIGNIFICANT CHANGE UP (ref 0–0.5)
EOSINOPHIL NFR BLD AUTO: 1.7 % — SIGNIFICANT CHANGE UP (ref 0–6)
HCT VFR BLD CALC: 46.3 % — SIGNIFICANT CHANGE UP (ref 39–50)
HGB BLD-MCNC: 15.6 G/DL — SIGNIFICANT CHANGE UP (ref 13–17)
IMM GRANULOCYTES NFR BLD AUTO: 0.7 % — SIGNIFICANT CHANGE UP (ref 0–0.9)
LYMPHOCYTES # BLD AUTO: 1.56 K/UL — SIGNIFICANT CHANGE UP (ref 1–3.3)
LYMPHOCYTES # BLD AUTO: 17.3 % — SIGNIFICANT CHANGE UP (ref 13–44)
MCHC RBC-ENTMCNC: 31.3 PG — SIGNIFICANT CHANGE UP (ref 27–34)
MCHC RBC-ENTMCNC: 33.7 GM/DL — SIGNIFICANT CHANGE UP (ref 32–36)
MCV RBC AUTO: 92.8 FL — SIGNIFICANT CHANGE UP (ref 80–100)
MONOCYTES # BLD AUTO: 0.55 K/UL — SIGNIFICANT CHANGE UP (ref 0–0.9)
MONOCYTES NFR BLD AUTO: 6.1 % — SIGNIFICANT CHANGE UP (ref 2–14)
NEUTROPHILS # BLD AUTO: 6.67 K/UL — SIGNIFICANT CHANGE UP (ref 1.8–7.4)
NEUTROPHILS NFR BLD AUTO: 73.9 % — SIGNIFICANT CHANGE UP (ref 43–77)
NRBC # BLD: 0 /100 WBCS — SIGNIFICANT CHANGE UP (ref 0–0)
PLATELET # BLD AUTO: 185 K/UL — SIGNIFICANT CHANGE UP (ref 150–400)
RBC # BLD: 4.99 M/UL — SIGNIFICANT CHANGE UP (ref 4.2–5.8)
RBC # FLD: 14.6 % — HIGH (ref 10.3–14.5)
WBC # BLD: 9.02 K/UL — SIGNIFICANT CHANGE UP (ref 3.8–10.5)
WBC # FLD AUTO: 9.02 K/UL — SIGNIFICANT CHANGE UP (ref 3.8–10.5)

## 2023-06-22 PROCEDURE — 99215 OFFICE O/P EST HI 40 MIN: CPT

## 2023-06-22 PROCEDURE — 96402 CHEMO HORMON ANTINEOPL SQ/IM: CPT

## 2023-06-22 PROCEDURE — 85027 COMPLETE CBC AUTOMATED: CPT

## 2023-06-23 LAB
ALBUMIN SERPL ELPH-MCNC: 4.3 G/DL
ALP BLD-CCNC: 117 U/L
ALT SERPL-CCNC: 16 U/L
ANION GAP SERPL CALC-SCNC: 12 MMOL/L
AST SERPL-CCNC: 21 U/L
BILIRUB SERPL-MCNC: 0.4 MG/DL
BUN SERPL-MCNC: 26 MG/DL
CALCIUM SERPL-MCNC: 9.7 MG/DL
CHLORIDE SERPL-SCNC: 102 MMOL/L
CO2 SERPL-SCNC: 26 MMOL/L
CREAT SERPL-MCNC: 0.96 MG/DL
EGFR: 84 ML/MIN/1.73M2
GLUCOSE SERPL-MCNC: 100 MG/DL
POTASSIUM SERPL-SCNC: 4.5 MMOL/L
PROT SERPL-MCNC: 7.2 G/DL
PSA FREE FLD-MCNC: 28 %
PSA FREE SERPL-MCNC: 1.78 NG/ML
PSA SERPL-MCNC: 6.31 NG/ML
SODIUM SERPL-SCNC: 140 MMOL/L

## 2023-06-23 RX ORDER — ABIRATERONE ACETATE 250 MG/1
250 TABLET, FILM COATED ORAL DAILY
Qty: 120 | Refills: 6 | Status: DISCONTINUED | COMMUNITY
Start: 2022-03-25 | End: 2023-06-23

## 2023-06-23 RX ORDER — PREDNISONE 5 MG/1
5 TABLET ORAL
Qty: 30 | Refills: 6 | Status: DISCONTINUED | COMMUNITY
Start: 2022-03-25 | End: 2023-06-23

## 2023-06-25 NOTE — HISTORY OF PRESENT ILLNESS
[de-identified] : Referred by: Dr. Adrian Kc\par \par Cancer Summary: \par DIAGNOSIS: metastatic prostate cancer \par PROCEDURE AND DATE: Prostate biopsy 2/1/2022\par PATHOLOGY: prostate adenocarcinoma, [place] 8 (4+4) and 9 (4+5) in all cores, +PNI\par STAGE: IV\par Chemotherapy: TBD \par Radiation: s/p palliative RT to prostate bed for symptom management \par pending RT to progressive spinal mets \par Hormonal: lupron initiated on 3/8/22. \par Abiraterone 1000mg daily + prednisone 5mg 4/1/22 - 6/2023, now with POD\par Switch to xtandi 160mg daily \par STATUS: active \par Genetics STATUS: sent 6/22/23 \par [facility] 5/22/23- Foundation testing- PDL1 IC score 0%, TC score 0%, TPS 0%\par \par Mr. Portillo presented at age 71 in February 2022 for evaluation of prostate cancer. \par The patient is an active smoker. \par \par Presenting HPI: Sriram was sent in for evaluation for locally advanced prostate cancer after recently being seen by uro-oncologist Dr. Kc.  He initially presented to Dr. Kc with an elevated PSA. MRI pelvis on 12/9/21 revealed 1 PIRADS 5 lesion with bilateral neurovascular bundle involvement and bilateral seminal vesicle invasion, no pelvic adenopathy. He underwent prostate biopsy on 2/14/22 which revealed high risk prostate cancer in 13/13 cores, idania 8  (4+4) and 9 (4+5). He is currently taking tamsulosin and finasteride. He reports extreme urinary urgency and constipation. He is currently taking dulcolax daily but still having difficulty with bowel movements. He reports he has lost 30lb over the past 2 years 2/2 difficulty eating 2/2 constipation. His bone scan is scheduled and he is ordered for CT chest as well. He was referred to medical oncology and radiation oncology, Dr. Alvarado. \par \par Imaging: \par MRI pelvis 12/9/21: 1 PIRADS 5 lesion with bilateral neurovascular bundle involvement and bilateral seminal vesicle invasion, no pelvic adenopathy.\par NM bone scan 2/24/22: focal increased radiotracer activity in L femoral, R 3rd rib and sacrum, c/f osseous metastatic disease. \par CT chest 4/12/22:  liver metastases, R 3rd rib mets, additional possible sclerotic mets, few indeterminate sub-4mm lung nodules. \par \par HCM: \par - COVID vaccination: s/p booster January 2022\par - Colonoscopy: done 2 years ago, normal \par \par SH: \par - Occupation: previously worked in photo finishes \par - Living situation: lives in [place], his wife passed away, he is here with his sister and HCP today (Maylin) \par - Smoking/etoh/illicits: he actively smokes 10 cigarettes / day x many years, denies etoh, denies illicits \par - Exercise: minimally active \par \par FH: \par - No family history of cancer  [de-identified] : Sriram presents for follow up on 6/22/23 for metastatic prostate cancer. \par Lupron/eligard initiated 3/2022. \par Abiraterone started 4/1/22. \par DEXA 6/6/22- normal bone density \par \par PSA increased to 3.54 at last visit. \par PSMA PET/CT 6/13/23- reveals focus of intensely increased PSMA expression within the LEFT L3 vertebral body (SUV 22.6) and adjacent to the LEFT L4/L5 facet joint. \par Foci of bone tracer seen in L femur, R 3rd rib and sacral regions on prior bone scan do not demonstrate current abnormal PSMA activity. \par No abnormal activity at prostatectomy site. \par Suspected foci of osseous mets at L3 and left L4/L5 facet joint. \par Pending MRI lumbosacral spine. \par \par At today's visit he is generally feeling well. His weight is stable.  He denies headache, visual changes, dizziness, fevers, chills, CP, SOB, n/v/d, LE edema. \par \par Laboratory studies reviewed at today's visit and notable for: WBC 9.64, Hb 15.6, Plt 193\par PSA trend: 25.9 --> 14.6 --> 2.23 --> 0.21 --> 0.09 --> 0.08 -> 0.06 --> 0.50 --> 3.54

## 2023-06-25 NOTE — RESULTS/DATA
[FreeTextEntry1] : Mr. Portillo initially presented at age 71 in February 2022 for evaluation of locally advanced prostate cancer. \par The patient has a medical history of active smoking. \par \par Bone scan concerning for osseous metastatic disease. Patient remains asymptomatic and without bone pain. \par CT chest revealed liver mets, bone mets. Small pulmonary nodules. \par Initiated eligard + abiraterone/pred from 4/2022-6/2023 with initial good response. \par Now with POD- new spinal mets. PSA increased to 3.54 \par PSMA PET/CT 6/13/23- reveals focus of intensely increased PSMA expression within the LEFT L3 vertebral body (SUV 22.6) and adjacent to the LEFT L4/L5 facet joint. \par Foci of bone tracer seen in L femur, R 3rd rib and sacral regions on prior bone scan do not demonstrate current abnormal PSMA activity. \par No abnormal activity at prostatectomy site. \par Suspected foci of osseous mets at L3 and left L4/L5 facet joint. \par MRI of lumbosacral spine pending. \par Will refer back to Dr. Alvarado to consider RT to the spine. \par Will switch abiraterone to enzalutamide 160mg daily. \par Trend PSA and testosterone levels q 3 months. \par Next visit 8/17/23.

## 2023-06-25 NOTE — PHYSICAL EXAM
[Ambulatory and capable of all self care but unable to carry out any work activities] : Status 2- Ambulatory and capable of all self care but unable to carry out any work activities. Up and about more than 50% of waking hours [Normal] : affect appropriate [de-identified] : thin male, pleasant, NAD

## 2023-06-26 LAB
TESTOST FREE SERPL-MCNC: 0 PG/ML
TESTOST SERPL-MCNC: <2.5 NG/DL

## 2023-06-28 ENCOUNTER — APPOINTMENT (OUTPATIENT)
Dept: MRI IMAGING | Facility: CLINIC | Age: 73
End: 2023-06-28
Payer: MEDICARE

## 2023-06-28 PROCEDURE — A9579: CPT

## 2023-06-28 PROCEDURE — 72158 MRI LUMBAR SPINE W/O & W/DYE: CPT

## 2023-06-28 PROCEDURE — A9585: CPT

## 2023-07-09 ENCOUNTER — NON-APPOINTMENT (OUTPATIENT)
Age: 73
End: 2023-07-09

## 2023-07-18 ENCOUNTER — OUTPATIENT (OUTPATIENT)
Dept: OUTPATIENT SERVICES | Facility: HOSPITAL | Age: 73
LOS: 1 days | End: 2023-07-18
Payer: MEDICARE

## 2023-07-18 DIAGNOSIS — C61 MALIGNANT NEOPLASM OF PROSTATE: ICD-10-CM

## 2023-07-19 ENCOUNTER — RESULT REVIEW (OUTPATIENT)
Age: 73
End: 2023-07-19

## 2023-07-19 ENCOUNTER — APPOINTMENT (OUTPATIENT)
Age: 73
End: 2023-07-19

## 2023-07-19 LAB
BASOPHILS # BLD AUTO: 0.05 K/UL — SIGNIFICANT CHANGE UP (ref 0–0.2)
BASOPHILS NFR BLD AUTO: 0.5 % — SIGNIFICANT CHANGE UP (ref 0–2)
EOSINOPHIL # BLD AUTO: 0.33 K/UL — SIGNIFICANT CHANGE UP (ref 0–0.5)
EOSINOPHIL NFR BLD AUTO: 3.6 % — SIGNIFICANT CHANGE UP (ref 0–6)
HCT VFR BLD CALC: 44.8 % — SIGNIFICANT CHANGE UP (ref 39–50)
HGB BLD-MCNC: 15.2 G/DL — SIGNIFICANT CHANGE UP (ref 13–17)
IMM GRANULOCYTES NFR BLD AUTO: 0.4 % — SIGNIFICANT CHANGE UP (ref 0–0.9)
LYMPHOCYTES # BLD AUTO: 1.57 K/UL — SIGNIFICANT CHANGE UP (ref 1–3.3)
LYMPHOCYTES # BLD AUTO: 16.9 % — SIGNIFICANT CHANGE UP (ref 13–44)
MCHC RBC-ENTMCNC: 30.7 PG — SIGNIFICANT CHANGE UP (ref 27–34)
MCHC RBC-ENTMCNC: 33.9 GM/DL — SIGNIFICANT CHANGE UP (ref 32–36)
MCV RBC AUTO: 90.5 FL — SIGNIFICANT CHANGE UP (ref 80–100)
MONOCYTES # BLD AUTO: 0.66 K/UL — SIGNIFICANT CHANGE UP (ref 0–0.9)
MONOCYTES NFR BLD AUTO: 7.1 % — SIGNIFICANT CHANGE UP (ref 2–14)
NEUTROPHILS # BLD AUTO: 6.62 K/UL — SIGNIFICANT CHANGE UP (ref 1.8–7.4)
NEUTROPHILS NFR BLD AUTO: 71.5 % — SIGNIFICANT CHANGE UP (ref 43–77)
NRBC # BLD: 0 /100 WBCS — SIGNIFICANT CHANGE UP (ref 0–0)
PLATELET # BLD AUTO: 195 K/UL — SIGNIFICANT CHANGE UP (ref 150–400)
RBC # BLD: 4.95 M/UL — SIGNIFICANT CHANGE UP (ref 4.2–5.8)
RBC # FLD: 14.5 % — SIGNIFICANT CHANGE UP (ref 10.3–14.5)
WBC # BLD: 9.27 K/UL — SIGNIFICANT CHANGE UP (ref 3.8–10.5)
WBC # FLD AUTO: 9.27 K/UL — SIGNIFICANT CHANGE UP (ref 3.8–10.5)

## 2023-07-20 ENCOUNTER — NON-APPOINTMENT (OUTPATIENT)
Age: 73
End: 2023-07-20

## 2023-07-20 LAB
ALBUMIN SERPL ELPH-MCNC: 4 G/DL
ALP BLD-CCNC: 113 U/L
ALT SERPL-CCNC: 10 U/L
ANION GAP SERPL CALC-SCNC: 12 MMOL/L
AST SERPL-CCNC: 19 U/L
BILIRUB SERPL-MCNC: 0.3 MG/DL
BUN SERPL-MCNC: 27 MG/DL
CALCIUM SERPL-MCNC: 10 MG/DL
CHLORIDE SERPL-SCNC: 101 MMOL/L
CO2 SERPL-SCNC: 25 MMOL/L
CREAT SERPL-MCNC: 0.99 MG/DL
EGFR: 81 ML/MIN/1.73M2
GLUCOSE SERPL-MCNC: 80 MG/DL
POTASSIUM SERPL-SCNC: 4.2 MMOL/L
PROT SERPL-MCNC: 6.8 G/DL
PSA FREE FLD-MCNC: 28 %
PSA FREE SERPL-MCNC: 1.95 NG/ML
PSA SERPL-MCNC: 6.86 NG/ML
SODIUM SERPL-SCNC: 138 MMOL/L

## 2023-07-21 LAB
TESTOST FREE SERPL-MCNC: 0.6 PG/ML
TESTOST SERPL-MCNC: <2.5 NG/DL

## 2023-07-24 ENCOUNTER — APPOINTMENT (OUTPATIENT)
Age: 73
End: 2023-07-24

## 2023-07-31 ENCOUNTER — LABORATORY RESULT (OUTPATIENT)
Age: 73
End: 2023-07-31

## 2023-07-31 ENCOUNTER — APPOINTMENT (OUTPATIENT)
Age: 73
End: 2023-07-31

## 2023-08-17 ENCOUNTER — APPOINTMENT (OUTPATIENT)
Dept: HEMATOLOGY ONCOLOGY | Facility: CLINIC | Age: 73
End: 2023-08-17
Payer: MEDICARE

## 2023-08-17 ENCOUNTER — RESULT REVIEW (OUTPATIENT)
Age: 73
End: 2023-08-17

## 2023-08-17 ENCOUNTER — APPOINTMENT (OUTPATIENT)
Dept: INFUSION THERAPY | Facility: CANCER CENTER | Age: 73
End: 2023-08-17

## 2023-08-17 VITALS
DIASTOLIC BLOOD PRESSURE: 86 MMHG | BODY MASS INDEX: 19.11 KG/M2 | TEMPERATURE: 98.2 F | SYSTOLIC BLOOD PRESSURE: 182 MMHG | HEIGHT: 73 IN | OXYGEN SATURATION: 98 % | HEART RATE: 80 BPM | WEIGHT: 144.2 LBS

## 2023-08-17 LAB
BASOPHILS # BLD AUTO: 0.05 K/UL — SIGNIFICANT CHANGE UP (ref 0–0.2)
BASOPHILS NFR BLD AUTO: 0.7 % — SIGNIFICANT CHANGE UP (ref 0–2)
EOSINOPHIL # BLD AUTO: 0.62 K/UL — HIGH (ref 0–0.5)
EOSINOPHIL NFR BLD AUTO: 8.7 % — HIGH (ref 0–6)
HCT VFR BLD CALC: 42.3 % — SIGNIFICANT CHANGE UP (ref 39–50)
HGB BLD-MCNC: 14.5 G/DL — SIGNIFICANT CHANGE UP (ref 13–17)
IMM GRANULOCYTES NFR BLD AUTO: 0.3 % — SIGNIFICANT CHANGE UP (ref 0–0.9)
LYMPHOCYTES # BLD AUTO: 0.8 K/UL — LOW (ref 1–3.3)
LYMPHOCYTES # BLD AUTO: 11.3 % — LOW (ref 13–44)
MCHC RBC-ENTMCNC: 30.9 PG — SIGNIFICANT CHANGE UP (ref 27–34)
MCHC RBC-ENTMCNC: 34.3 GM/DL — SIGNIFICANT CHANGE UP (ref 32–36)
MCV RBC AUTO: 90 FL — SIGNIFICANT CHANGE UP (ref 80–100)
MONOCYTES # BLD AUTO: 0.66 K/UL — SIGNIFICANT CHANGE UP (ref 0–0.9)
MONOCYTES NFR BLD AUTO: 9.3 % — SIGNIFICANT CHANGE UP (ref 2–14)
NEUTROPHILS # BLD AUTO: 4.96 K/UL — SIGNIFICANT CHANGE UP (ref 1.8–7.4)
NEUTROPHILS NFR BLD AUTO: 69.7 % — SIGNIFICANT CHANGE UP (ref 43–77)
NRBC # BLD: 0 /100 WBCS — SIGNIFICANT CHANGE UP (ref 0–0)
PLATELET # BLD AUTO: 181 K/UL — SIGNIFICANT CHANGE UP (ref 150–400)
RBC # BLD: 4.7 M/UL — SIGNIFICANT CHANGE UP (ref 4.2–5.8)
RBC # FLD: 14.5 % — SIGNIFICANT CHANGE UP (ref 10.3–14.5)
WBC # BLD: 7.11 K/UL — SIGNIFICANT CHANGE UP (ref 3.8–10.5)
WBC # FLD AUTO: 7.11 K/UL — SIGNIFICANT CHANGE UP (ref 3.8–10.5)

## 2023-08-17 PROCEDURE — 85027 COMPLETE CBC AUTOMATED: CPT

## 2023-08-17 PROCEDURE — 99215 OFFICE O/P EST HI 40 MIN: CPT

## 2023-08-17 PROCEDURE — 96402 CHEMO HORMON ANTINEOPL SQ/IM: CPT

## 2023-08-17 RX ORDER — FOLIC ACID 1 MG/1
1 TABLET ORAL DAILY
Qty: 90 | Refills: 3 | Status: DISCONTINUED | COMMUNITY
Start: 2022-03-08 | End: 2023-08-17

## 2023-08-17 NOTE — PHYSICAL EXAM
[Restricted in physically strenuous activity but ambulatory and able to carry out work of a light or sedentary nature] : Status 1- Restricted in physically strenuous activity but ambulatory and able to carry out work of a light or sedentary nature, e.g., light house work, office work [Normal] : affect appropriate [de-identified] : thin male, pleasant, NAD

## 2023-08-17 NOTE — RESULTS/DATA
[FreeTextEntry1] : Mr. Portillo initially presented at age 71 in February 2022 for evaluation of locally advanced prostate cancer.  The patient has a medical history of active smoking.   Bone scan concerning for osseous metastatic disease. Patient remains asymptomatic and without bone pain.  CT chest revealed liver mets, bone mets. Small pulmonary nodules.  Initiated eligard + abiraterone/pred from 4/2022-6/2023 with initial good response.  Now with POD- new spinal mets. PSA increased to 3.54  PSMA PET/CT 6/13/23- reveals focus of intensely increased PSMA expression within the LEFT L3 vertebral body (SUV 22.6) and adjacent to the LEFT L4/L5 facet joint.  Foci of bone tracer seen in L femur, R 3rd rib and sacral regions on prior bone scan do not demonstrate current abnormal PSMA activity.  No abnormal activity at prostatectomy site.  Suspected foci of osseous mets at L3 and left L4/L5 facet joint.  Metastatic disease confirmed on MRI of the spne.  He is now s/p RT x 10 fractions to the spine. Denies back pain.  Initiated enzalutamide (xtandi) 160mg daily on 6/22/23- had decreased appetite but now feels improved.  Will continue for now. If symptoms persist at next visit, can dose reduce.  We previously discussed possible addition of docetaxel. Can consider docetaxel in the event of progression. His performance status has improved.  Discussed Xgeva for castrate resistant disease- reports terrible dental health. Has many decayed/broken teeth. Unlikely to be a candidate for xgeva. Dental letter provided.  DEXA normal. Recommend calcium and vitamin D.  Continue follow with uro-oncology Dr. Kc. Continue tamsulosin and finasteride.  Urged to follow with PCP for monitoring of BP. We will trend PSA (free and total), testosterone levels q 3 months.  Next visit 9/28/23 w/ NP.  All patient questions answered at today's visit. Patient urged to call the office with any questions or concerns.

## 2023-08-17 NOTE — HISTORY OF PRESENT ILLNESS
[de-identified] : Referred by: Dr. Adrian Kc  Cancer Summary:  DIAGNOSIS: metastatic prostate cancer  PROCEDURE AND DATE: Prostate biopsy 2/1/2022 PATHOLOGY: prostate adenocarcinoma, Idania 8 (4+4) and 9 (4+5) in all cores, +PNI STAGE: IV Chemotherapy: TBD  Radiation: s/p palliative RT to prostate bed for symptom management  pending RT to progressive spinal mets  Hormonal: lupron initiated on 3/8/22.  Abiraterone 1000mg daily + prednisone 5mg 4/1/22 - 6/2023, now with POD Switch to xtandi 160mg daily 6/22/2023  STATUS: active  Genetics STATUS: [facility] genetic testing sent 6/22/23 - Negative for pathogenic mutations or VUS  Foundation 5/22/23- Foundation testing- PDL1 IC score 0%, TC score 0%, TPS 0%  Mr. Portillo presented at age 71 in February 2022 for evaluation of prostate cancer.  The patient is an active smoker.   Presenting HPI: Sriram was sent in for evaluation for locally advanced prostate cancer after recently being seen by uro-oncologist Dr. Kc.  He initially presented to Dr. Kc with an elevated PSA. MRI pelvis on 12/9/21 revealed 1 PIRADS 5 lesion with bilateral neurovascular bundle involvement and bilateral seminal vesicle invasion, no pelvic adenopathy. He underwent prostate biopsy on 2/14/22 which revealed high risk prostate cancer in 13/13 cores, idania 8  (4+4) and 9 (4+5). He is currently taking tamsulosin and finasteride. He reports extreme urinary urgency and constipation. He is currently taking dulcolax daily but still having difficulty with bowel movements. He reports he has lost 30lb over the past 2 years 2/2 difficulty eating 2/2 constipation. His bone scan is scheduled and he is ordered for CT chest as well. He was referred to medical oncology and radiation oncology, Dr. Alvarado.   Imaging:  MRI pelvis 12/9/21: 1 PIRADS 5 lesion with bilateral neurovascular bundle involvement and bilateral seminal vesicle invasion, no pelvic adenopathy. NM bone scan 2/24/22: focal increased radiotracer activity in L femoral, R 3rd rib and sacrum, c/f osseous metastatic disease.  CT chest 4/12/22:  liver metastases, R 3rd rib mets, additional possible sclerotic mets, few indeterminate sub-4mm lung nodules.   HCM:  - COVID vaccination: s/p booster January 2022 - Colonoscopy: done 2 years ago, normal   SH:  - Occupation: previously worked in photo finishes  - Living situation: lives in [place], his wife passed away, he is here with his sister and HCP today (Maylin)  - Smoking/etoh/illicits: he actively smokes 10 cigarettes / day x many years, denies etoh, denies illicits  - Exercise: minimally active   FH:  - No family history of cancer  [de-identified] : Sriram presents for follow up on 8/17/23 for metastatic prostate cancer.  Lupron/eligard initiated 3/2022.  Abiraterone started 4/1/22. Switched to xtandi 6/22/23.  DEXA 6/6/22- normal bone density   At today's visit he states he feels a bit lightheaded as he had not eaten in several hours. His most recent PSA had increased to 3.54 and imaging was concerning for progression of disease. He underwent 10 fractions of RT to the spine w/ Dr. Alvarado. He denies any back pain at this time. He continues to have urinary frequency.  He states the xtandi decreased his appetite but it is now coming back. His weight is stable.   PSA increased to 3.54 at last visit.  PSMA PET/CT 6/13/23- reveals focus of intensely increased PSMA expression within the LEFT L3 vertebral body (SUV 22.6) and adjacent to the LEFT L4/L5 facet joint.  Foci of bone tracer seen in L femur, R 3rd rib and sacral regions on prior bone scan do not demonstrate current abnormal PSMA activity.  No abnormal activity at prostatectomy site.  Suspected foci of osseous mets at L3 and left L4/L5 facet joint.   MRI lumbosacral spine 6/28/23- subtle MRI findings at L5-S1 corresponding to focal abnormal PET/CT, concerning for bony metastatic disease, L3 findings suspicious for metastatic disease  Laboratory studies reviewed at today's visit and notable for: WBC 7.11, Hb 14.5, Plt 181  PSA trend: 25.9 --> 14.6 --> 2.23 --> 0.21 --> 0.09 --> 0.08 -> 0.06 --> 0.50 --> 3.54

## 2023-08-18 LAB
ALBUMIN SERPL ELPH-MCNC: 4 G/DL
ALP BLD-CCNC: 100 U/L
ALT SERPL-CCNC: 9 U/L
ANION GAP SERPL CALC-SCNC: 12 MMOL/L
AST SERPL-CCNC: 16 U/L
BILIRUB SERPL-MCNC: 0.3 MG/DL
BUN SERPL-MCNC: 27 MG/DL
CALCIUM SERPL-MCNC: 9.3 MG/DL
CHLORIDE SERPL-SCNC: 103 MMOL/L
CO2 SERPL-SCNC: 25 MMOL/L
CREAT SERPL-MCNC: 0.9 MG/DL
EGFR: 91 ML/MIN/1.73M2
GLUCOSE SERPL-MCNC: 103 MG/DL
POTASSIUM SERPL-SCNC: 4.3 MMOL/L
PROT SERPL-MCNC: 6.9 G/DL
PSA FREE FLD-MCNC: 24 %
PSA FREE SERPL-MCNC: 1.96 NG/ML
PSA SERPL-MCNC: 8.15 NG/ML
SODIUM SERPL-SCNC: 140 MMOL/L

## 2023-08-21 LAB
TESTOST FREE SERPL-MCNC: 1.1 PG/ML
TESTOST SERPL-MCNC: <2.5 NG/DL

## 2023-09-22 ENCOUNTER — OUTPATIENT (OUTPATIENT)
Dept: OUTPATIENT SERVICES | Facility: HOSPITAL | Age: 73
LOS: 1 days | End: 2023-09-22
Payer: MEDICARE

## 2023-09-22 DIAGNOSIS — C61 MALIGNANT NEOPLASM OF PROSTATE: ICD-10-CM

## 2023-09-28 ENCOUNTER — RESULT REVIEW (OUTPATIENT)
Age: 73
End: 2023-09-28

## 2023-09-28 ENCOUNTER — APPOINTMENT (OUTPATIENT)
Dept: HEMATOLOGY ONCOLOGY | Facility: CLINIC | Age: 73
End: 2023-09-28
Payer: MEDICARE

## 2023-09-28 VITALS
HEIGHT: 73 IN | BODY MASS INDEX: 18.77 KG/M2 | SYSTOLIC BLOOD PRESSURE: 153 MMHG | DIASTOLIC BLOOD PRESSURE: 70 MMHG | OXYGEN SATURATION: 99 % | TEMPERATURE: 97.7 F | HEART RATE: 66 BPM | WEIGHT: 141.6 LBS

## 2023-09-28 LAB
BASOPHILS # BLD AUTO: 0.03 K/UL — SIGNIFICANT CHANGE UP (ref 0–0.2)
BASOPHILS NFR BLD AUTO: 0.5 % — SIGNIFICANT CHANGE UP (ref 0–2)
EOSINOPHIL # BLD AUTO: 0.3 K/UL — SIGNIFICANT CHANGE UP (ref 0–0.5)
EOSINOPHIL NFR BLD AUTO: 4.6 % — SIGNIFICANT CHANGE UP (ref 0–6)
HCT VFR BLD CALC: 40.8 % — SIGNIFICANT CHANGE UP (ref 39–50)
HGB BLD-MCNC: 13.8 G/DL — SIGNIFICANT CHANGE UP (ref 13–17)
IMM GRANULOCYTES NFR BLD AUTO: 0.3 % — SIGNIFICANT CHANGE UP (ref 0–0.9)
LYMPHOCYTES # BLD AUTO: 0.72 K/UL — LOW (ref 1–3.3)
LYMPHOCYTES # BLD AUTO: 11 % — LOW (ref 13–44)
MCHC RBC-ENTMCNC: 31.1 PG — SIGNIFICANT CHANGE UP (ref 27–34)
MCHC RBC-ENTMCNC: 33.8 GM/DL — SIGNIFICANT CHANGE UP (ref 32–36)
MCV RBC AUTO: 91.9 FL — SIGNIFICANT CHANGE UP (ref 80–100)
MONOCYTES # BLD AUTO: 0.62 K/UL — SIGNIFICANT CHANGE UP (ref 0–0.9)
MONOCYTES NFR BLD AUTO: 9.5 % — SIGNIFICANT CHANGE UP (ref 2–14)
NEUTROPHILS # BLD AUTO: 4.86 K/UL — SIGNIFICANT CHANGE UP (ref 1.8–7.4)
NEUTROPHILS NFR BLD AUTO: 74.1 % — SIGNIFICANT CHANGE UP (ref 43–77)
NRBC # BLD: 0 /100 WBCS — SIGNIFICANT CHANGE UP (ref 0–0)
PLATELET # BLD AUTO: 181 K/UL — SIGNIFICANT CHANGE UP (ref 150–400)
RBC # BLD: 4.44 M/UL — SIGNIFICANT CHANGE UP (ref 4.2–5.8)
RBC # FLD: 15.5 % — HIGH (ref 10.3–14.5)
WBC # BLD: 6.55 K/UL — SIGNIFICANT CHANGE UP (ref 3.8–10.5)
WBC # FLD AUTO: 6.55 K/UL — SIGNIFICANT CHANGE UP (ref 3.8–10.5)

## 2023-09-28 PROCEDURE — 99215 OFFICE O/P EST HI 40 MIN: CPT

## 2023-09-29 LAB
ALBUMIN SERPL ELPH-MCNC: 3.9 G/DL
ALP BLD-CCNC: 98 U/L
ALT SERPL-CCNC: 8 U/L
ANION GAP SERPL CALC-SCNC: 17 MMOL/L
AST SERPL-CCNC: 15 U/L
BILIRUB SERPL-MCNC: 0.3 MG/DL
BUN SERPL-MCNC: 23 MG/DL
CALCIUM SERPL-MCNC: 9.3 MG/DL
CHLORIDE SERPL-SCNC: 101 MMOL/L
CO2 SERPL-SCNC: 23 MMOL/L
CREAT SERPL-MCNC: 0.85 MG/DL
EGFR: 92 ML/MIN/1.73M2
GLUCOSE SERPL-MCNC: 100 MG/DL
POTASSIUM SERPL-SCNC: 3.9 MMOL/L
PROT SERPL-MCNC: 6.6 G/DL
PSA FREE FLD-MCNC: 21 %
PSA FREE SERPL-MCNC: 1.44 NG/ML
PSA SERPL-MCNC: 6.93 NG/ML
SODIUM SERPL-SCNC: 141 MMOL/L

## 2023-10-03 LAB
TESTOST FREE SERPL-MCNC: 0.7 PG/ML
TESTOST SERPL-MCNC: <2.5 NG/DL

## 2023-11-17 ENCOUNTER — APPOINTMENT (OUTPATIENT)
Dept: INFUSION THERAPY | Facility: CANCER CENTER | Age: 73
End: 2023-11-17

## 2023-11-17 ENCOUNTER — APPOINTMENT (OUTPATIENT)
Dept: HEMATOLOGY ONCOLOGY | Facility: CLINIC | Age: 73
End: 2023-11-17
Payer: MEDICARE

## 2023-11-17 ENCOUNTER — RESULT REVIEW (OUTPATIENT)
Age: 73
End: 2023-11-17

## 2023-11-17 VITALS
WEIGHT: 146 LBS | TEMPERATURE: 97.7 F | HEIGHT: 73 IN | SYSTOLIC BLOOD PRESSURE: 177 MMHG | DIASTOLIC BLOOD PRESSURE: 91 MMHG | HEART RATE: 72 BPM | OXYGEN SATURATION: 99 % | BODY MASS INDEX: 19.35 KG/M2

## 2023-11-17 LAB
BASOPHILS # BLD AUTO: 0.03 K/UL — SIGNIFICANT CHANGE UP (ref 0–0.2)
BASOPHILS # BLD AUTO: 0.03 K/UL — SIGNIFICANT CHANGE UP (ref 0–0.2)
BASOPHILS NFR BLD AUTO: 0.4 % — SIGNIFICANT CHANGE UP (ref 0–2)
BASOPHILS NFR BLD AUTO: 0.4 % — SIGNIFICANT CHANGE UP (ref 0–2)
EOSINOPHIL # BLD AUTO: 0.26 K/UL — SIGNIFICANT CHANGE UP (ref 0–0.5)
EOSINOPHIL # BLD AUTO: 0.26 K/UL — SIGNIFICANT CHANGE UP (ref 0–0.5)
EOSINOPHIL NFR BLD AUTO: 3.6 % — SIGNIFICANT CHANGE UP (ref 0–6)
EOSINOPHIL NFR BLD AUTO: 3.6 % — SIGNIFICANT CHANGE UP (ref 0–6)
HCT VFR BLD CALC: 42.4 % — SIGNIFICANT CHANGE UP (ref 39–50)
HCT VFR BLD CALC: 42.4 % — SIGNIFICANT CHANGE UP (ref 39–50)
HGB BLD-MCNC: 14.3 G/DL — SIGNIFICANT CHANGE UP (ref 13–17)
HGB BLD-MCNC: 14.3 G/DL — SIGNIFICANT CHANGE UP (ref 13–17)
IMM GRANULOCYTES NFR BLD AUTO: 0.4 % — SIGNIFICANT CHANGE UP (ref 0–0.9)
IMM GRANULOCYTES NFR BLD AUTO: 0.4 % — SIGNIFICANT CHANGE UP (ref 0–0.9)
LYMPHOCYTES # BLD AUTO: 1.01 K/UL — SIGNIFICANT CHANGE UP (ref 1–3.3)
LYMPHOCYTES # BLD AUTO: 1.01 K/UL — SIGNIFICANT CHANGE UP (ref 1–3.3)
LYMPHOCYTES # BLD AUTO: 14.1 % — SIGNIFICANT CHANGE UP (ref 13–44)
LYMPHOCYTES # BLD AUTO: 14.1 % — SIGNIFICANT CHANGE UP (ref 13–44)
MCHC RBC-ENTMCNC: 31.4 PG — SIGNIFICANT CHANGE UP (ref 27–34)
MCHC RBC-ENTMCNC: 31.4 PG — SIGNIFICANT CHANGE UP (ref 27–34)
MCHC RBC-ENTMCNC: 33.7 GM/DL — SIGNIFICANT CHANGE UP (ref 32–36)
MCHC RBC-ENTMCNC: 33.7 GM/DL — SIGNIFICANT CHANGE UP (ref 32–36)
MCV RBC AUTO: 93.2 FL — SIGNIFICANT CHANGE UP (ref 80–100)
MCV RBC AUTO: 93.2 FL — SIGNIFICANT CHANGE UP (ref 80–100)
MONOCYTES # BLD AUTO: 0.65 K/UL — SIGNIFICANT CHANGE UP (ref 0–0.9)
MONOCYTES # BLD AUTO: 0.65 K/UL — SIGNIFICANT CHANGE UP (ref 0–0.9)
MONOCYTES NFR BLD AUTO: 9.1 % — SIGNIFICANT CHANGE UP (ref 2–14)
MONOCYTES NFR BLD AUTO: 9.1 % — SIGNIFICANT CHANGE UP (ref 2–14)
NEUTROPHILS # BLD AUTO: 5.19 K/UL — SIGNIFICANT CHANGE UP (ref 1.8–7.4)
NEUTROPHILS # BLD AUTO: 5.19 K/UL — SIGNIFICANT CHANGE UP (ref 1.8–7.4)
NEUTROPHILS NFR BLD AUTO: 72.4 % — SIGNIFICANT CHANGE UP (ref 43–77)
NEUTROPHILS NFR BLD AUTO: 72.4 % — SIGNIFICANT CHANGE UP (ref 43–77)
NRBC # BLD: 0 /100 WBCS — SIGNIFICANT CHANGE UP (ref 0–0)
NRBC # BLD: 0 /100 WBCS — SIGNIFICANT CHANGE UP (ref 0–0)
PLATELET # BLD AUTO: 206 K/UL — SIGNIFICANT CHANGE UP (ref 150–400)
PLATELET # BLD AUTO: 206 K/UL — SIGNIFICANT CHANGE UP (ref 150–400)
RBC # BLD: 4.55 M/UL — SIGNIFICANT CHANGE UP (ref 4.2–5.8)
RBC # BLD: 4.55 M/UL — SIGNIFICANT CHANGE UP (ref 4.2–5.8)
RBC # FLD: 15.1 % — HIGH (ref 10.3–14.5)
RBC # FLD: 15.1 % — HIGH (ref 10.3–14.5)
WBC # BLD: 7.17 K/UL — SIGNIFICANT CHANGE UP (ref 3.8–10.5)
WBC # BLD: 7.17 K/UL — SIGNIFICANT CHANGE UP (ref 3.8–10.5)
WBC # FLD AUTO: 7.17 K/UL — SIGNIFICANT CHANGE UP (ref 3.8–10.5)
WBC # FLD AUTO: 7.17 K/UL — SIGNIFICANT CHANGE UP (ref 3.8–10.5)

## 2023-11-17 PROCEDURE — 99213 OFFICE O/P EST LOW 20 MIN: CPT

## 2023-11-17 PROCEDURE — 96402 CHEMO HORMON ANTINEOPL SQ/IM: CPT

## 2023-11-17 PROCEDURE — 85027 COMPLETE CBC AUTOMATED: CPT

## 2023-11-20 LAB
ALBUMIN SERPL ELPH-MCNC: 4 G/DL
ALP BLD-CCNC: 122 U/L
ALT SERPL-CCNC: 8 U/L
ANION GAP SERPL CALC-SCNC: 12 MMOL/L
AST SERPL-CCNC: 18 U/L
BILIRUB SERPL-MCNC: 0.3 MG/DL
BUN SERPL-MCNC: 26 MG/DL
CALCIUM SERPL-MCNC: 9.4 MG/DL
CHLORIDE SERPL-SCNC: 103 MMOL/L
CO2 SERPL-SCNC: 27 MMOL/L
CREAT SERPL-MCNC: 0.79 MG/DL
EGFR: 94 ML/MIN/1.73M2
GLUCOSE SERPL-MCNC: 89 MG/DL
POTASSIUM SERPL-SCNC: 4.1 MMOL/L
PROT SERPL-MCNC: 6.7 G/DL
PSA FREE FLD-MCNC: 24 %
PSA FREE SERPL-MCNC: 2.28 NG/ML
PSA SERPL-MCNC: 9.48 NG/ML
SODIUM SERPL-SCNC: 142 MMOL/L

## 2023-11-21 LAB
TESTOST FREE SERPL-MCNC: 0.9 PG/ML
TESTOST SERPL-MCNC: <2.5 NG/DL

## 2023-12-05 ENCOUNTER — APPOINTMENT (OUTPATIENT)
Dept: NUCLEAR MEDICINE | Facility: CLINIC | Age: 73
End: 2023-12-05
Payer: MEDICARE

## 2023-12-05 ENCOUNTER — RESULT REVIEW (OUTPATIENT)
Age: 73
End: 2023-12-05

## 2023-12-05 PROCEDURE — 78816 PET IMAGE W/CT FULL BODY: CPT | Mod: PS

## 2023-12-05 PROCEDURE — A9595: CPT

## 2023-12-08 ENCOUNTER — OUTPATIENT (OUTPATIENT)
Dept: OUTPATIENT SERVICES | Facility: HOSPITAL | Age: 73
LOS: 1 days | End: 2023-12-08
Payer: MEDICARE

## 2023-12-08 DIAGNOSIS — C61 MALIGNANT NEOPLASM OF PROSTATE: ICD-10-CM

## 2023-12-11 ENCOUNTER — RESULT REVIEW (OUTPATIENT)
Age: 73
End: 2023-12-11

## 2023-12-11 ENCOUNTER — LABORATORY RESULT (OUTPATIENT)
Age: 73
End: 2023-12-11

## 2023-12-11 ENCOUNTER — APPOINTMENT (OUTPATIENT)
Dept: HEMATOLOGY ONCOLOGY | Facility: CLINIC | Age: 73
End: 2023-12-11
Payer: MEDICARE

## 2023-12-11 VITALS
SYSTOLIC BLOOD PRESSURE: 194 MMHG | BODY MASS INDEX: 19.75 KG/M2 | WEIGHT: 149 LBS | HEART RATE: 80 BPM | HEIGHT: 73 IN | DIASTOLIC BLOOD PRESSURE: 96 MMHG | TEMPERATURE: 97.2 F | OXYGEN SATURATION: 99 %

## 2023-12-11 DIAGNOSIS — Z91.89 OTHER SPECIFIED PERSONAL RISK FACTORS, NOT ELSEWHERE CLASSIFIED: ICD-10-CM

## 2023-12-11 LAB
BASOPHILS # BLD AUTO: 0.04 K/UL — SIGNIFICANT CHANGE UP (ref 0–0.2)
BASOPHILS # BLD AUTO: 0.04 K/UL — SIGNIFICANT CHANGE UP (ref 0–0.2)
BASOPHILS NFR BLD AUTO: 0.6 % — SIGNIFICANT CHANGE UP (ref 0–2)
BASOPHILS NFR BLD AUTO: 0.6 % — SIGNIFICANT CHANGE UP (ref 0–2)
EOSINOPHIL # BLD AUTO: 0.34 K/UL — SIGNIFICANT CHANGE UP (ref 0–0.5)
EOSINOPHIL # BLD AUTO: 0.34 K/UL — SIGNIFICANT CHANGE UP (ref 0–0.5)
EOSINOPHIL NFR BLD AUTO: 4.8 % — SIGNIFICANT CHANGE UP (ref 0–6)
EOSINOPHIL NFR BLD AUTO: 4.8 % — SIGNIFICANT CHANGE UP (ref 0–6)
HCT VFR BLD CALC: 40.9 % — SIGNIFICANT CHANGE UP (ref 39–50)
HCT VFR BLD CALC: 40.9 % — SIGNIFICANT CHANGE UP (ref 39–50)
HGB BLD-MCNC: 14 G/DL — SIGNIFICANT CHANGE UP (ref 13–17)
HGB BLD-MCNC: 14 G/DL — SIGNIFICANT CHANGE UP (ref 13–17)
IMM GRANULOCYTES NFR BLD AUTO: 0.4 % — SIGNIFICANT CHANGE UP (ref 0–0.9)
IMM GRANULOCYTES NFR BLD AUTO: 0.4 % — SIGNIFICANT CHANGE UP (ref 0–0.9)
LYMPHOCYTES # BLD AUTO: 1.04 K/UL — SIGNIFICANT CHANGE UP (ref 1–3.3)
LYMPHOCYTES # BLD AUTO: 1.04 K/UL — SIGNIFICANT CHANGE UP (ref 1–3.3)
LYMPHOCYTES # BLD AUTO: 14.7 % — SIGNIFICANT CHANGE UP (ref 13–44)
LYMPHOCYTES # BLD AUTO: 14.7 % — SIGNIFICANT CHANGE UP (ref 13–44)
MCHC RBC-ENTMCNC: 31.3 PG — SIGNIFICANT CHANGE UP (ref 27–34)
MCHC RBC-ENTMCNC: 31.3 PG — SIGNIFICANT CHANGE UP (ref 27–34)
MCHC RBC-ENTMCNC: 34.2 GM/DL — SIGNIFICANT CHANGE UP (ref 32–36)
MCHC RBC-ENTMCNC: 34.2 GM/DL — SIGNIFICANT CHANGE UP (ref 32–36)
MCV RBC AUTO: 91.5 FL — SIGNIFICANT CHANGE UP (ref 80–100)
MCV RBC AUTO: 91.5 FL — SIGNIFICANT CHANGE UP (ref 80–100)
MONOCYTES # BLD AUTO: 0.59 K/UL — SIGNIFICANT CHANGE UP (ref 0–0.9)
MONOCYTES # BLD AUTO: 0.59 K/UL — SIGNIFICANT CHANGE UP (ref 0–0.9)
MONOCYTES NFR BLD AUTO: 8.3 % — SIGNIFICANT CHANGE UP (ref 2–14)
MONOCYTES NFR BLD AUTO: 8.3 % — SIGNIFICANT CHANGE UP (ref 2–14)
NEUTROPHILS # BLD AUTO: 5.04 K/UL — SIGNIFICANT CHANGE UP (ref 1.8–7.4)
NEUTROPHILS # BLD AUTO: 5.04 K/UL — SIGNIFICANT CHANGE UP (ref 1.8–7.4)
NEUTROPHILS NFR BLD AUTO: 71.2 % — SIGNIFICANT CHANGE UP (ref 43–77)
NEUTROPHILS NFR BLD AUTO: 71.2 % — SIGNIFICANT CHANGE UP (ref 43–77)
NRBC # BLD: 0 /100 WBCS — SIGNIFICANT CHANGE UP (ref 0–0)
NRBC # BLD: 0 /100 WBCS — SIGNIFICANT CHANGE UP (ref 0–0)
PLATELET # BLD AUTO: 251 K/UL — SIGNIFICANT CHANGE UP (ref 150–400)
PLATELET # BLD AUTO: 251 K/UL — SIGNIFICANT CHANGE UP (ref 150–400)
RBC # BLD: 4.47 M/UL — SIGNIFICANT CHANGE UP (ref 4.2–5.8)
RBC # BLD: 4.47 M/UL — SIGNIFICANT CHANGE UP (ref 4.2–5.8)
RBC # FLD: 14.2 % — SIGNIFICANT CHANGE UP (ref 10.3–14.5)
RBC # FLD: 14.2 % — SIGNIFICANT CHANGE UP (ref 10.3–14.5)
WBC # BLD: 7.08 K/UL — SIGNIFICANT CHANGE UP (ref 3.8–10.5)
WBC # BLD: 7.08 K/UL — SIGNIFICANT CHANGE UP (ref 3.8–10.5)
WBC # FLD AUTO: 7.08 K/UL — SIGNIFICANT CHANGE UP (ref 3.8–10.5)
WBC # FLD AUTO: 7.08 K/UL — SIGNIFICANT CHANGE UP (ref 3.8–10.5)

## 2023-12-11 PROCEDURE — 99215 OFFICE O/P EST HI 40 MIN: CPT

## 2023-12-12 LAB
ALBUMIN SERPL ELPH-MCNC: 4 G/DL
ALP BLD-CCNC: 137 U/L
ALT SERPL-CCNC: 5 U/L
ANION GAP SERPL CALC-SCNC: 12 MMOL/L
APTT BLD: 33.5 SEC
AST SERPL-CCNC: 17 U/L
BILIRUB SERPL-MCNC: 0.2 MG/DL
BUN SERPL-MCNC: 24 MG/DL
CALCIUM SERPL-MCNC: 9.4 MG/DL
CHLORIDE SERPL-SCNC: 101 MMOL/L
CO2 SERPL-SCNC: 27 MMOL/L
CREAT SERPL-MCNC: 0.75 MG/DL
CRP SERPL-MCNC: 13 MG/L
EGFR: 96 ML/MIN/1.73M2
FERRITIN SERPL-MCNC: 402 NG/ML
FOLATE SERPL-MCNC: 19.1 NG/ML
GLUCOSE SERPL-MCNC: 82 MG/DL
HBV CORE IGG+IGM SER QL: REACTIVE
HBV SURFACE AB SER QL: REACTIVE
HBV SURFACE AB SERPL IA-ACNC: 37.9 MIU/ML
HBV SURFACE AG SER QL: NONREACTIVE
HCV AB SER QL: ABNORMAL
HCV S/CO RATIO: 4.18 S/CO
HIV1+2 AB SPEC QL IA.RAPID: NONREACTIVE
INR PPP: 0.91 RATIO
IRON SATN MFR SERPL: 31 %
IRON SERPL-MCNC: 63 UG/DL
POTASSIUM SERPL-SCNC: 4.7 MMOL/L
PROT SERPL-MCNC: 6.8 G/DL
PSA FREE FLD-MCNC: 23 %
PSA FREE SERPL-MCNC: 2.46 NG/ML
PSA SERPL-MCNC: 10.9 NG/ML
PT BLD: 10.3 SEC
SODIUM SERPL-SCNC: 139 MMOL/L
TIBC SERPL-MCNC: 205 UG/DL
UIBC SERPL-MCNC: 142 UG/DL
VIT B12 SERPL-MCNC: 1968 PG/ML

## 2023-12-13 LAB
TESTOST FREE SERPL-MCNC: 1.6 PG/ML
TESTOST SERPL-MCNC: <2.5 NG/DL

## 2023-12-15 RX ORDER — ONDANSETRON 8 MG/1
8 TABLET ORAL EVERY 8 HOURS
Qty: 30 | Refills: 2 | Status: ACTIVE | COMMUNITY
Start: 2023-12-15 | End: 1900-01-01

## 2023-12-21 ENCOUNTER — RESULT REVIEW (OUTPATIENT)
Age: 73
End: 2023-12-21

## 2023-12-21 ENCOUNTER — APPOINTMENT (OUTPATIENT)
Dept: HEMATOLOGY ONCOLOGY | Facility: CLINIC | Age: 73
End: 2023-12-21

## 2023-12-21 ENCOUNTER — NON-APPOINTMENT (OUTPATIENT)
Age: 73
End: 2023-12-21

## 2023-12-21 ENCOUNTER — APPOINTMENT (OUTPATIENT)
Age: 73
End: 2023-12-21

## 2023-12-21 VITALS
TEMPERATURE: 97.4 F | DIASTOLIC BLOOD PRESSURE: 95 MMHG | SYSTOLIC BLOOD PRESSURE: 170 MMHG | HEART RATE: 71 BPM | WEIGHT: 147.6 LBS | HEIGHT: 73.31 IN | OXYGEN SATURATION: 98 % | BODY MASS INDEX: 19.35 KG/M2

## 2023-12-21 LAB
BASOPHILS # BLD AUTO: 0.03 K/UL — SIGNIFICANT CHANGE UP (ref 0–0.2)
BASOPHILS # BLD AUTO: 0.03 K/UL — SIGNIFICANT CHANGE UP (ref 0–0.2)
BASOPHILS NFR BLD AUTO: 0.4 % — SIGNIFICANT CHANGE UP (ref 0–2)
BASOPHILS NFR BLD AUTO: 0.4 % — SIGNIFICANT CHANGE UP (ref 0–2)
EOSINOPHIL # BLD AUTO: 0.29 K/UL — SIGNIFICANT CHANGE UP (ref 0–0.5)
EOSINOPHIL # BLD AUTO: 0.29 K/UL — SIGNIFICANT CHANGE UP (ref 0–0.5)
EOSINOPHIL NFR BLD AUTO: 3.9 % — SIGNIFICANT CHANGE UP (ref 0–6)
EOSINOPHIL NFR BLD AUTO: 3.9 % — SIGNIFICANT CHANGE UP (ref 0–6)
HCT VFR BLD CALC: 40.7 % — SIGNIFICANT CHANGE UP (ref 39–50)
HCT VFR BLD CALC: 40.7 % — SIGNIFICANT CHANGE UP (ref 39–50)
HGB BLD-MCNC: 14.1 G/DL — SIGNIFICANT CHANGE UP (ref 13–17)
HGB BLD-MCNC: 14.1 G/DL — SIGNIFICANT CHANGE UP (ref 13–17)
IMM GRANULOCYTES NFR BLD AUTO: 0.3 % — SIGNIFICANT CHANGE UP (ref 0–0.9)
IMM GRANULOCYTES NFR BLD AUTO: 0.3 % — SIGNIFICANT CHANGE UP (ref 0–0.9)
LYMPHOCYTES # BLD AUTO: 1.07 K/UL — SIGNIFICANT CHANGE UP (ref 1–3.3)
LYMPHOCYTES # BLD AUTO: 1.07 K/UL — SIGNIFICANT CHANGE UP (ref 1–3.3)
LYMPHOCYTES # BLD AUTO: 14.5 % — SIGNIFICANT CHANGE UP (ref 13–44)
LYMPHOCYTES # BLD AUTO: 14.5 % — SIGNIFICANT CHANGE UP (ref 13–44)
MCHC RBC-ENTMCNC: 31.3 PG — SIGNIFICANT CHANGE UP (ref 27–34)
MCHC RBC-ENTMCNC: 31.3 PG — SIGNIFICANT CHANGE UP (ref 27–34)
MCHC RBC-ENTMCNC: 34.6 GM/DL — SIGNIFICANT CHANGE UP (ref 32–36)
MCHC RBC-ENTMCNC: 34.6 GM/DL — SIGNIFICANT CHANGE UP (ref 32–36)
MCV RBC AUTO: 90.4 FL — SIGNIFICANT CHANGE UP (ref 80–100)
MCV RBC AUTO: 90.4 FL — SIGNIFICANT CHANGE UP (ref 80–100)
MONOCYTES # BLD AUTO: 0.62 K/UL — SIGNIFICANT CHANGE UP (ref 0–0.9)
MONOCYTES # BLD AUTO: 0.62 K/UL — SIGNIFICANT CHANGE UP (ref 0–0.9)
MONOCYTES NFR BLD AUTO: 8.4 % — SIGNIFICANT CHANGE UP (ref 2–14)
MONOCYTES NFR BLD AUTO: 8.4 % — SIGNIFICANT CHANGE UP (ref 2–14)
NEUTROPHILS # BLD AUTO: 5.36 K/UL — SIGNIFICANT CHANGE UP (ref 1.8–7.4)
NEUTROPHILS # BLD AUTO: 5.36 K/UL — SIGNIFICANT CHANGE UP (ref 1.8–7.4)
NEUTROPHILS NFR BLD AUTO: 72.5 % — SIGNIFICANT CHANGE UP (ref 43–77)
NEUTROPHILS NFR BLD AUTO: 72.5 % — SIGNIFICANT CHANGE UP (ref 43–77)
NRBC # BLD: 0 /100 WBCS — SIGNIFICANT CHANGE UP (ref 0–0)
NRBC # BLD: 0 /100 WBCS — SIGNIFICANT CHANGE UP (ref 0–0)
PLATELET # BLD AUTO: 244 K/UL — SIGNIFICANT CHANGE UP (ref 150–400)
PLATELET # BLD AUTO: 244 K/UL — SIGNIFICANT CHANGE UP (ref 150–400)
RBC # BLD: 4.5 M/UL — SIGNIFICANT CHANGE UP (ref 4.2–5.8)
RBC # BLD: 4.5 M/UL — SIGNIFICANT CHANGE UP (ref 4.2–5.8)
RBC # FLD: 14.3 % — SIGNIFICANT CHANGE UP (ref 10.3–14.5)
RBC # FLD: 14.3 % — SIGNIFICANT CHANGE UP (ref 10.3–14.5)
WBC # BLD: 7.39 K/UL — SIGNIFICANT CHANGE UP (ref 3.8–10.5)
WBC # BLD: 7.39 K/UL — SIGNIFICANT CHANGE UP (ref 3.8–10.5)
WBC # FLD AUTO: 7.39 K/UL — SIGNIFICANT CHANGE UP (ref 3.8–10.5)
WBC # FLD AUTO: 7.39 K/UL — SIGNIFICANT CHANGE UP (ref 3.8–10.5)

## 2023-12-22 LAB
ALBUMIN SERPL ELPH-MCNC: 4.1 G/DL
ALP BLD-CCNC: 138 U/L
ALT SERPL-CCNC: 6 U/L
ANION GAP SERPL CALC-SCNC: 14 MMOL/L
AST SERPL-CCNC: 16 U/L
BILIRUB SERPL-MCNC: 0.2 MG/DL
BUN SERPL-MCNC: 24 MG/DL
CALCIUM SERPL-MCNC: 9.6 MG/DL
CHLORIDE SERPL-SCNC: 103 MMOL/L
CO2 SERPL-SCNC: 26 MMOL/L
CREAT SERPL-MCNC: 0.82 MG/DL
EGFR: 93 ML/MIN/1.73M2
GLUCOSE SERPL-MCNC: 76 MG/DL
POTASSIUM SERPL-SCNC: 4.8 MMOL/L
PROT SERPL-MCNC: 7 G/DL
SODIUM SERPL-SCNC: 142 MMOL/L

## 2023-12-29 ENCOUNTER — RESULT REVIEW (OUTPATIENT)
Age: 73
End: 2023-12-29

## 2024-01-04 ENCOUNTER — RESULT REVIEW (OUTPATIENT)
Age: 74
End: 2024-01-04

## 2024-01-04 ENCOUNTER — NON-APPOINTMENT (OUTPATIENT)
Age: 74
End: 2024-01-04

## 2024-01-04 ENCOUNTER — APPOINTMENT (OUTPATIENT)
Dept: INFUSION THERAPY | Facility: CANCER CENTER | Age: 74
End: 2024-01-04

## 2024-01-04 DIAGNOSIS — R11.2 NAUSEA WITH VOMITING, UNSPECIFIED: ICD-10-CM

## 2024-01-04 DIAGNOSIS — Z51.11 ENCOUNTER FOR ANTINEOPLASTIC CHEMOTHERAPY: ICD-10-CM

## 2024-01-04 LAB
BASOPHILS # BLD AUTO: 0.02 K/UL — SIGNIFICANT CHANGE UP (ref 0–0.2)
BASOPHILS # BLD AUTO: 0.02 K/UL — SIGNIFICANT CHANGE UP (ref 0–0.2)
BASOPHILS NFR BLD AUTO: 0.2 % — SIGNIFICANT CHANGE UP (ref 0–2)
BASOPHILS NFR BLD AUTO: 0.2 % — SIGNIFICANT CHANGE UP (ref 0–2)
EOSINOPHIL # BLD AUTO: 0.06 K/UL — SIGNIFICANT CHANGE UP (ref 0–0.5)
EOSINOPHIL # BLD AUTO: 0.06 K/UL — SIGNIFICANT CHANGE UP (ref 0–0.5)
EOSINOPHIL NFR BLD AUTO: 0.5 % — SIGNIFICANT CHANGE UP (ref 0–6)
EOSINOPHIL NFR BLD AUTO: 0.5 % — SIGNIFICANT CHANGE UP (ref 0–6)
HCT VFR BLD CALC: 36.4 % — LOW (ref 39–50)
HCT VFR BLD CALC: 36.4 % — LOW (ref 39–50)
HGB BLD-MCNC: 12.8 G/DL — LOW (ref 13–17)
HGB BLD-MCNC: 12.8 G/DL — LOW (ref 13–17)
IMM GRANULOCYTES NFR BLD AUTO: 0.4 % — SIGNIFICANT CHANGE UP (ref 0–0.9)
IMM GRANULOCYTES NFR BLD AUTO: 0.4 % — SIGNIFICANT CHANGE UP (ref 0–0.9)
LYMPHOCYTES # BLD AUTO: 0.6 K/UL — LOW (ref 1–3.3)
LYMPHOCYTES # BLD AUTO: 0.6 K/UL — LOW (ref 1–3.3)
LYMPHOCYTES # BLD AUTO: 5.2 % — LOW (ref 13–44)
LYMPHOCYTES # BLD AUTO: 5.2 % — LOW (ref 13–44)
MCHC RBC-ENTMCNC: 31.4 PG — SIGNIFICANT CHANGE UP (ref 27–34)
MCHC RBC-ENTMCNC: 31.4 PG — SIGNIFICANT CHANGE UP (ref 27–34)
MCHC RBC-ENTMCNC: 35.2 GM/DL — SIGNIFICANT CHANGE UP (ref 32–36)
MCHC RBC-ENTMCNC: 35.2 GM/DL — SIGNIFICANT CHANGE UP (ref 32–36)
MCV RBC AUTO: 89.2 FL — SIGNIFICANT CHANGE UP (ref 80–100)
MCV RBC AUTO: 89.2 FL — SIGNIFICANT CHANGE UP (ref 80–100)
MONOCYTES # BLD AUTO: 0.61 K/UL — SIGNIFICANT CHANGE UP (ref 0–0.9)
MONOCYTES # BLD AUTO: 0.61 K/UL — SIGNIFICANT CHANGE UP (ref 0–0.9)
MONOCYTES NFR BLD AUTO: 5.3 % — SIGNIFICANT CHANGE UP (ref 2–14)
MONOCYTES NFR BLD AUTO: 5.3 % — SIGNIFICANT CHANGE UP (ref 2–14)
NEUTROPHILS # BLD AUTO: 10.17 K/UL — HIGH (ref 1.8–7.4)
NEUTROPHILS # BLD AUTO: 10.17 K/UL — HIGH (ref 1.8–7.4)
NEUTROPHILS NFR BLD AUTO: 88.4 % — HIGH (ref 43–77)
NEUTROPHILS NFR BLD AUTO: 88.4 % — HIGH (ref 43–77)
NRBC # BLD: 0 /100 WBCS — SIGNIFICANT CHANGE UP (ref 0–0)
NRBC # BLD: 0 /100 WBCS — SIGNIFICANT CHANGE UP (ref 0–0)
PLATELET # BLD AUTO: 215 K/UL — SIGNIFICANT CHANGE UP (ref 150–400)
PLATELET # BLD AUTO: 215 K/UL — SIGNIFICANT CHANGE UP (ref 150–400)
RBC # BLD: 4.08 M/UL — LOW (ref 4.2–5.8)
RBC # BLD: 4.08 M/UL — LOW (ref 4.2–5.8)
RBC # FLD: 14.1 % — SIGNIFICANT CHANGE UP (ref 10.3–14.5)
RBC # FLD: 14.1 % — SIGNIFICANT CHANGE UP (ref 10.3–14.5)
WBC # BLD: 11.51 K/UL — HIGH (ref 3.8–10.5)
WBC # BLD: 11.51 K/UL — HIGH (ref 3.8–10.5)
WBC # FLD AUTO: 11.51 K/UL — HIGH (ref 3.8–10.5)
WBC # FLD AUTO: 11.51 K/UL — HIGH (ref 3.8–10.5)

## 2024-01-05 LAB
ALBUMIN SERPL ELPH-MCNC: 3.5 G/DL — SIGNIFICANT CHANGE UP (ref 3.3–5)
ALBUMIN SERPL ELPH-MCNC: 3.5 G/DL — SIGNIFICANT CHANGE UP (ref 3.3–5)
ALP SERPL-CCNC: 121 U/L — HIGH (ref 40–120)
ALP SERPL-CCNC: 121 U/L — HIGH (ref 40–120)
ALT FLD-CCNC: 7 U/L — LOW (ref 10–45)
ALT FLD-CCNC: 7 U/L — LOW (ref 10–45)
ANION GAP SERPL CALC-SCNC: 12 MMOL/L — SIGNIFICANT CHANGE UP (ref 5–17)
ANION GAP SERPL CALC-SCNC: 12 MMOL/L — SIGNIFICANT CHANGE UP (ref 5–17)
AST SERPL-CCNC: 18 U/L — SIGNIFICANT CHANGE UP (ref 10–40)
AST SERPL-CCNC: 18 U/L — SIGNIFICANT CHANGE UP (ref 10–40)
BILIRUB SERPL-MCNC: 0.3 MG/DL — SIGNIFICANT CHANGE UP (ref 0.2–1.2)
BILIRUB SERPL-MCNC: 0.3 MG/DL — SIGNIFICANT CHANGE UP (ref 0.2–1.2)
BUN SERPL-MCNC: 26 MG/DL — HIGH (ref 7–23)
BUN SERPL-MCNC: 26 MG/DL — HIGH (ref 7–23)
CALCIUM SERPL-MCNC: 8.9 MG/DL — SIGNIFICANT CHANGE UP (ref 8.4–10.5)
CALCIUM SERPL-MCNC: 8.9 MG/DL — SIGNIFICANT CHANGE UP (ref 8.4–10.5)
CHLORIDE SERPL-SCNC: 103 MMOL/L — SIGNIFICANT CHANGE UP (ref 96–108)
CHLORIDE SERPL-SCNC: 103 MMOL/L — SIGNIFICANT CHANGE UP (ref 96–108)
CO2 SERPL-SCNC: 25 MMOL/L — SIGNIFICANT CHANGE UP (ref 22–31)
CO2 SERPL-SCNC: 25 MMOL/L — SIGNIFICANT CHANGE UP (ref 22–31)
CREAT SERPL-MCNC: 0.66 MG/DL — SIGNIFICANT CHANGE UP (ref 0.5–1.3)
CREAT SERPL-MCNC: 0.66 MG/DL — SIGNIFICANT CHANGE UP (ref 0.5–1.3)
EGFR: 100 ML/MIN/1.73M2 — SIGNIFICANT CHANGE UP
EGFR: 100 ML/MIN/1.73M2 — SIGNIFICANT CHANGE UP
GLUCOSE SERPL-MCNC: 122 MG/DL — HIGH (ref 70–99)
GLUCOSE SERPL-MCNC: 122 MG/DL — HIGH (ref 70–99)
MAGNESIUM SERPL-MCNC: 1.9 MG/DL — SIGNIFICANT CHANGE UP (ref 1.6–2.6)
MAGNESIUM SERPL-MCNC: 1.9 MG/DL — SIGNIFICANT CHANGE UP (ref 1.6–2.6)
PHOSPHATE SERPL-MCNC: 2.9 MG/DL — SIGNIFICANT CHANGE UP (ref 2.5–4.5)
PHOSPHATE SERPL-MCNC: 2.9 MG/DL — SIGNIFICANT CHANGE UP (ref 2.5–4.5)
POTASSIUM SERPL-MCNC: 3.4 MMOL/L — LOW (ref 3.5–5.3)
POTASSIUM SERPL-MCNC: 3.4 MMOL/L — LOW (ref 3.5–5.3)
POTASSIUM SERPL-SCNC: 3.4 MMOL/L — LOW (ref 3.5–5.3)
POTASSIUM SERPL-SCNC: 3.4 MMOL/L — LOW (ref 3.5–5.3)
PROT SERPL-MCNC: 6.8 G/DL — SIGNIFICANT CHANGE UP (ref 6–8.3)
PROT SERPL-MCNC: 6.8 G/DL — SIGNIFICANT CHANGE UP (ref 6–8.3)
PSA FLD-MCNC: 12.5 NG/ML — HIGH (ref 0–4)
PSA FLD-MCNC: 12.5 NG/ML — HIGH (ref 0–4)
SODIUM SERPL-SCNC: 140 MMOL/L — SIGNIFICANT CHANGE UP (ref 135–145)
SODIUM SERPL-SCNC: 140 MMOL/L — SIGNIFICANT CHANGE UP (ref 135–145)

## 2024-01-10 ENCOUNTER — NON-APPOINTMENT (OUTPATIENT)
Age: 74
End: 2024-01-10

## 2024-01-11 ENCOUNTER — RESULT REVIEW (OUTPATIENT)
Age: 74
End: 2024-01-11

## 2024-01-11 ENCOUNTER — APPOINTMENT (OUTPATIENT)
Dept: INFUSION THERAPY | Facility: CANCER CENTER | Age: 74
End: 2024-01-11

## 2024-01-11 LAB
ALBUMIN SERPL ELPH-MCNC: 3.6 G/DL — SIGNIFICANT CHANGE UP (ref 3.3–5)
ALBUMIN SERPL ELPH-MCNC: 3.6 G/DL — SIGNIFICANT CHANGE UP (ref 3.3–5)
ALP SERPL-CCNC: 111 U/L — SIGNIFICANT CHANGE UP (ref 40–120)
ALP SERPL-CCNC: 111 U/L — SIGNIFICANT CHANGE UP (ref 40–120)
ALT FLD-CCNC: 8 U/L — LOW (ref 10–45)
ALT FLD-CCNC: 8 U/L — LOW (ref 10–45)
ANION GAP SERPL CALC-SCNC: 12 MMOL/L — SIGNIFICANT CHANGE UP (ref 5–17)
ANION GAP SERPL CALC-SCNC: 12 MMOL/L — SIGNIFICANT CHANGE UP (ref 5–17)
AST SERPL-CCNC: 19 U/L — SIGNIFICANT CHANGE UP (ref 10–40)
AST SERPL-CCNC: 19 U/L — SIGNIFICANT CHANGE UP (ref 10–40)
BASOPHILS # BLD AUTO: 0.01 K/UL — SIGNIFICANT CHANGE UP (ref 0–0.2)
BASOPHILS # BLD AUTO: 0.01 K/UL — SIGNIFICANT CHANGE UP (ref 0–0.2)
BASOPHILS NFR BLD AUTO: 0.1 % — SIGNIFICANT CHANGE UP (ref 0–2)
BASOPHILS NFR BLD AUTO: 0.1 % — SIGNIFICANT CHANGE UP (ref 0–2)
BILIRUB SERPL-MCNC: 0.2 MG/DL — SIGNIFICANT CHANGE UP (ref 0.2–1.2)
BILIRUB SERPL-MCNC: 0.2 MG/DL — SIGNIFICANT CHANGE UP (ref 0.2–1.2)
BUN SERPL-MCNC: 29 MG/DL — HIGH (ref 7–23)
BUN SERPL-MCNC: 29 MG/DL — HIGH (ref 7–23)
CALCIUM SERPL-MCNC: 9.3 MG/DL — SIGNIFICANT CHANGE UP (ref 8.4–10.5)
CALCIUM SERPL-MCNC: 9.3 MG/DL — SIGNIFICANT CHANGE UP (ref 8.4–10.5)
CHLORIDE SERPL-SCNC: 102 MMOL/L — SIGNIFICANT CHANGE UP (ref 96–108)
CHLORIDE SERPL-SCNC: 102 MMOL/L — SIGNIFICANT CHANGE UP (ref 96–108)
CO2 SERPL-SCNC: 25 MMOL/L — SIGNIFICANT CHANGE UP (ref 22–31)
CO2 SERPL-SCNC: 25 MMOL/L — SIGNIFICANT CHANGE UP (ref 22–31)
CREAT SERPL-MCNC: 0.77 MG/DL — SIGNIFICANT CHANGE UP (ref 0.5–1.3)
CREAT SERPL-MCNC: 0.77 MG/DL — SIGNIFICANT CHANGE UP (ref 0.5–1.3)
EGFR: 95 ML/MIN/1.73M2 — SIGNIFICANT CHANGE UP
EGFR: 95 ML/MIN/1.73M2 — SIGNIFICANT CHANGE UP
EOSINOPHIL # BLD AUTO: 0.03 K/UL — SIGNIFICANT CHANGE UP (ref 0–0.5)
EOSINOPHIL # BLD AUTO: 0.03 K/UL — SIGNIFICANT CHANGE UP (ref 0–0.5)
EOSINOPHIL NFR BLD AUTO: 0.4 % — SIGNIFICANT CHANGE UP (ref 0–6)
EOSINOPHIL NFR BLD AUTO: 0.4 % — SIGNIFICANT CHANGE UP (ref 0–6)
GLUCOSE SERPL-MCNC: 114 MG/DL — HIGH (ref 70–99)
GLUCOSE SERPL-MCNC: 114 MG/DL — HIGH (ref 70–99)
HCT VFR BLD CALC: 37.6 % — LOW (ref 39–50)
HCT VFR BLD CALC: 37.6 % — LOW (ref 39–50)
HGB BLD-MCNC: 13 G/DL — SIGNIFICANT CHANGE UP (ref 13–17)
HGB BLD-MCNC: 13 G/DL — SIGNIFICANT CHANGE UP (ref 13–17)
IMM GRANULOCYTES NFR BLD AUTO: 0.4 % — SIGNIFICANT CHANGE UP (ref 0–0.9)
IMM GRANULOCYTES NFR BLD AUTO: 0.4 % — SIGNIFICANT CHANGE UP (ref 0–0.9)
LYMPHOCYTES # BLD AUTO: 1.05 K/UL — SIGNIFICANT CHANGE UP (ref 1–3.3)
LYMPHOCYTES # BLD AUTO: 1.05 K/UL — SIGNIFICANT CHANGE UP (ref 1–3.3)
LYMPHOCYTES # BLD AUTO: 12.4 % — LOW (ref 13–44)
LYMPHOCYTES # BLD AUTO: 12.4 % — LOW (ref 13–44)
MAGNESIUM SERPL-MCNC: 2 MG/DL — SIGNIFICANT CHANGE UP (ref 1.6–2.6)
MAGNESIUM SERPL-MCNC: 2 MG/DL — SIGNIFICANT CHANGE UP (ref 1.6–2.6)
MCHC RBC-ENTMCNC: 30.7 PG — SIGNIFICANT CHANGE UP (ref 27–34)
MCHC RBC-ENTMCNC: 30.7 PG — SIGNIFICANT CHANGE UP (ref 27–34)
MCHC RBC-ENTMCNC: 34.6 GM/DL — SIGNIFICANT CHANGE UP (ref 32–36)
MCHC RBC-ENTMCNC: 34.6 GM/DL — SIGNIFICANT CHANGE UP (ref 32–36)
MCV RBC AUTO: 88.9 FL — SIGNIFICANT CHANGE UP (ref 80–100)
MCV RBC AUTO: 88.9 FL — SIGNIFICANT CHANGE UP (ref 80–100)
MONOCYTES # BLD AUTO: 0.63 K/UL — SIGNIFICANT CHANGE UP (ref 0–0.9)
MONOCYTES # BLD AUTO: 0.63 K/UL — SIGNIFICANT CHANGE UP (ref 0–0.9)
MONOCYTES NFR BLD AUTO: 7.5 % — SIGNIFICANT CHANGE UP (ref 2–14)
MONOCYTES NFR BLD AUTO: 7.5 % — SIGNIFICANT CHANGE UP (ref 2–14)
NEUTROPHILS # BLD AUTO: 6.69 K/UL — SIGNIFICANT CHANGE UP (ref 1.8–7.4)
NEUTROPHILS # BLD AUTO: 6.69 K/UL — SIGNIFICANT CHANGE UP (ref 1.8–7.4)
NEUTROPHILS NFR BLD AUTO: 79.2 % — HIGH (ref 43–77)
NEUTROPHILS NFR BLD AUTO: 79.2 % — HIGH (ref 43–77)
NRBC # BLD: 0 /100 WBCS — SIGNIFICANT CHANGE UP (ref 0–0)
NRBC # BLD: 0 /100 WBCS — SIGNIFICANT CHANGE UP (ref 0–0)
PHOSPHATE SERPL-MCNC: 3.6 MG/DL — SIGNIFICANT CHANGE UP (ref 2.5–4.5)
PHOSPHATE SERPL-MCNC: 3.6 MG/DL — SIGNIFICANT CHANGE UP (ref 2.5–4.5)
PLATELET # BLD AUTO: 259 K/UL — SIGNIFICANT CHANGE UP (ref 150–400)
PLATELET # BLD AUTO: 259 K/UL — SIGNIFICANT CHANGE UP (ref 150–400)
POTASSIUM SERPL-MCNC: 3.9 MMOL/L — SIGNIFICANT CHANGE UP (ref 3.5–5.3)
POTASSIUM SERPL-MCNC: 3.9 MMOL/L — SIGNIFICANT CHANGE UP (ref 3.5–5.3)
POTASSIUM SERPL-SCNC: 3.9 MMOL/L — SIGNIFICANT CHANGE UP (ref 3.5–5.3)
POTASSIUM SERPL-SCNC: 3.9 MMOL/L — SIGNIFICANT CHANGE UP (ref 3.5–5.3)
PROT SERPL-MCNC: 7.1 G/DL — SIGNIFICANT CHANGE UP (ref 6–8.3)
PROT SERPL-MCNC: 7.1 G/DL — SIGNIFICANT CHANGE UP (ref 6–8.3)
PSA FLD-MCNC: 14.3 NG/ML — HIGH (ref 0–4)
PSA FLD-MCNC: 14.3 NG/ML — HIGH (ref 0–4)
RBC # BLD: 4.23 M/UL — SIGNIFICANT CHANGE UP (ref 4.2–5.8)
RBC # BLD: 4.23 M/UL — SIGNIFICANT CHANGE UP (ref 4.2–5.8)
RBC # FLD: 14.6 % — HIGH (ref 10.3–14.5)
RBC # FLD: 14.6 % — HIGH (ref 10.3–14.5)
SODIUM SERPL-SCNC: 138 MMOL/L — SIGNIFICANT CHANGE UP (ref 135–145)
SODIUM SERPL-SCNC: 138 MMOL/L — SIGNIFICANT CHANGE UP (ref 135–145)
WBC # BLD: 8.44 K/UL — SIGNIFICANT CHANGE UP (ref 3.8–10.5)
WBC # BLD: 8.44 K/UL — SIGNIFICANT CHANGE UP (ref 3.8–10.5)
WBC # FLD AUTO: 8.44 K/UL — SIGNIFICANT CHANGE UP (ref 3.8–10.5)
WBC # FLD AUTO: 8.44 K/UL — SIGNIFICANT CHANGE UP (ref 3.8–10.5)

## 2024-01-18 ENCOUNTER — APPOINTMENT (OUTPATIENT)
Dept: INFUSION THERAPY | Facility: CANCER CENTER | Age: 74
End: 2024-01-18

## 2024-01-18 ENCOUNTER — RESULT REVIEW (OUTPATIENT)
Age: 74
End: 2024-01-18

## 2024-01-18 LAB
BASOPHILS # BLD AUTO: 0.03 K/UL — SIGNIFICANT CHANGE UP (ref 0–0.2)
BASOPHILS NFR BLD AUTO: 0.5 % — SIGNIFICANT CHANGE UP (ref 0–2)
EOSINOPHIL # BLD AUTO: 0.17 K/UL — SIGNIFICANT CHANGE UP (ref 0–0.5)
EOSINOPHIL NFR BLD AUTO: 2.8 % — SIGNIFICANT CHANGE UP (ref 0–6)
HCT VFR BLD CALC: 36.3 % — LOW (ref 39–50)
HGB BLD-MCNC: 12.3 G/DL — LOW (ref 13–17)
IMM GRANULOCYTES NFR BLD AUTO: 1 % — HIGH (ref 0–0.9)
LYMPHOCYTES # BLD AUTO: 0.87 K/UL — LOW (ref 1–3.3)
LYMPHOCYTES # BLD AUTO: 14.6 % — SIGNIFICANT CHANGE UP (ref 13–44)
MCHC RBC-ENTMCNC: 30.4 PG — SIGNIFICANT CHANGE UP (ref 27–34)
MCHC RBC-ENTMCNC: 33.9 GM/DL — SIGNIFICANT CHANGE UP (ref 32–36)
MCV RBC AUTO: 89.6 FL — SIGNIFICANT CHANGE UP (ref 80–100)
MONOCYTES # BLD AUTO: 0.32 K/UL — SIGNIFICANT CHANGE UP (ref 0–0.9)
MONOCYTES NFR BLD AUTO: 5.4 % — SIGNIFICANT CHANGE UP (ref 2–14)
NEUTROPHILS # BLD AUTO: 4.52 K/UL — SIGNIFICANT CHANGE UP (ref 1.8–7.4)
NEUTROPHILS NFR BLD AUTO: 75.7 % — SIGNIFICANT CHANGE UP (ref 43–77)
NRBC # BLD: 0 /100 WBCS — SIGNIFICANT CHANGE UP (ref 0–0)
PLATELET # BLD AUTO: 243 K/UL — SIGNIFICANT CHANGE UP (ref 150–400)
RBC # BLD: 4.05 M/UL — LOW (ref 4.2–5.8)
RBC # FLD: 14.6 % — HIGH (ref 10.3–14.5)
WBC # BLD: 5.97 K/UL — SIGNIFICANT CHANGE UP (ref 3.8–10.5)
WBC # FLD AUTO: 5.97 K/UL — SIGNIFICANT CHANGE UP (ref 3.8–10.5)

## 2024-01-19 LAB
ALBUMIN SERPL ELPH-MCNC: 3.7 G/DL — SIGNIFICANT CHANGE UP (ref 3.3–5)
ALP SERPL-CCNC: 109 U/L — SIGNIFICANT CHANGE UP (ref 40–120)
ALT FLD-CCNC: 7 U/L — LOW (ref 10–45)
ANION GAP SERPL CALC-SCNC: 11 MMOL/L — SIGNIFICANT CHANGE UP (ref 5–17)
AST SERPL-CCNC: 19 U/L — SIGNIFICANT CHANGE UP (ref 10–40)
BILIRUB SERPL-MCNC: 0.2 MG/DL — SIGNIFICANT CHANGE UP (ref 0.2–1.2)
BUN SERPL-MCNC: 22 MG/DL — SIGNIFICANT CHANGE UP (ref 7–23)
CALCIUM SERPL-MCNC: 9.1 MG/DL — SIGNIFICANT CHANGE UP (ref 8.4–10.5)
CHLORIDE SERPL-SCNC: 101 MMOL/L — SIGNIFICANT CHANGE UP (ref 96–108)
CO2 SERPL-SCNC: 26 MMOL/L — SIGNIFICANT CHANGE UP (ref 22–31)
CREAT SERPL-MCNC: 0.78 MG/DL — SIGNIFICANT CHANGE UP (ref 0.5–1.3)
EGFR: 95 ML/MIN/1.73M2 — SIGNIFICANT CHANGE UP
GLUCOSE SERPL-MCNC: 118 MG/DL — HIGH (ref 70–99)
MAGNESIUM SERPL-MCNC: 1.9 MG/DL — SIGNIFICANT CHANGE UP (ref 1.6–2.6)
PHOSPHATE SERPL-MCNC: 2.8 MG/DL — SIGNIFICANT CHANGE UP (ref 2.5–4.5)
POTASSIUM SERPL-MCNC: 3.8 MMOL/L — SIGNIFICANT CHANGE UP (ref 3.5–5.3)
POTASSIUM SERPL-SCNC: 3.8 MMOL/L — SIGNIFICANT CHANGE UP (ref 3.5–5.3)
PROT SERPL-MCNC: 6.7 G/DL — SIGNIFICANT CHANGE UP (ref 6–8.3)
SODIUM SERPL-SCNC: 138 MMOL/L — SIGNIFICANT CHANGE UP (ref 135–145)

## 2024-02-01 ENCOUNTER — RESULT REVIEW (OUTPATIENT)
Age: 74
End: 2024-02-01

## 2024-02-01 ENCOUNTER — APPOINTMENT (OUTPATIENT)
Dept: INFUSION THERAPY | Facility: CANCER CENTER | Age: 74
End: 2024-02-01

## 2024-02-01 ENCOUNTER — APPOINTMENT (OUTPATIENT)
Dept: ULTRASOUND IMAGING | Facility: CLINIC | Age: 74
End: 2024-02-01
Payer: MEDICARE

## 2024-02-01 DIAGNOSIS — R60.0 LOCALIZED EDEMA: ICD-10-CM

## 2024-02-01 LAB
BASOPHILS # BLD AUTO: 0.03 K/UL — SIGNIFICANT CHANGE UP (ref 0–0.2)
BASOPHILS NFR BLD AUTO: 0.5 % — SIGNIFICANT CHANGE UP (ref 0–2)
EOSINOPHIL # BLD AUTO: 0.15 K/UL — SIGNIFICANT CHANGE UP (ref 0–0.5)
EOSINOPHIL NFR BLD AUTO: 2.4 % — SIGNIFICANT CHANGE UP (ref 0–6)
HCT VFR BLD CALC: 34.2 % — LOW (ref 39–50)
HGB BLD-MCNC: 11.7 G/DL — LOW (ref 13–17)
IMM GRANULOCYTES NFR BLD AUTO: 0.6 % — SIGNIFICANT CHANGE UP (ref 0–0.9)
LYMPHOCYTES # BLD AUTO: 0.78 K/UL — LOW (ref 1–3.3)
LYMPHOCYTES # BLD AUTO: 12.4 % — LOW (ref 13–44)
MCHC RBC-ENTMCNC: 31 PG — SIGNIFICANT CHANGE UP (ref 27–34)
MCHC RBC-ENTMCNC: 34.2 GM/DL — SIGNIFICANT CHANGE UP (ref 32–36)
MCV RBC AUTO: 90.7 FL — SIGNIFICANT CHANGE UP (ref 80–100)
MONOCYTES # BLD AUTO: 0.81 K/UL — SIGNIFICANT CHANGE UP (ref 0–0.9)
MONOCYTES NFR BLD AUTO: 12.9 % — SIGNIFICANT CHANGE UP (ref 2–14)
NEUTROPHILS # BLD AUTO: 4.46 K/UL — SIGNIFICANT CHANGE UP (ref 1.8–7.4)
NEUTROPHILS NFR BLD AUTO: 71.2 % — SIGNIFICANT CHANGE UP (ref 43–77)
NRBC # BLD: 0 /100 WBCS — SIGNIFICANT CHANGE UP (ref 0–0)
PLATELET # BLD AUTO: 242 K/UL — SIGNIFICANT CHANGE UP (ref 150–400)
RBC # BLD: 3.77 M/UL — LOW (ref 4.2–5.8)
RBC # FLD: 15.7 % — HIGH (ref 10.3–14.5)
WBC # BLD: 6.27 K/UL — SIGNIFICANT CHANGE UP (ref 3.8–10.5)
WBC # FLD AUTO: 6.27 K/UL — SIGNIFICANT CHANGE UP (ref 3.8–10.5)

## 2024-02-01 PROCEDURE — G0103: CPT

## 2024-02-01 PROCEDURE — 96409 CHEMO IV PUSH SNGL DRUG: CPT

## 2024-02-01 PROCEDURE — 84100 ASSAY OF PHOSPHORUS: CPT

## 2024-02-01 PROCEDURE — 93971 EXTREMITY STUDY: CPT | Mod: RT

## 2024-02-01 PROCEDURE — 80053 COMPREHEN METABOLIC PANEL: CPT

## 2024-02-01 PROCEDURE — 96375 TX/PRO/DX INJ NEW DRUG ADDON: CPT

## 2024-02-01 PROCEDURE — 83735 ASSAY OF MAGNESIUM: CPT

## 2024-02-01 PROCEDURE — 96413 CHEMO IV INFUSION 1 HR: CPT

## 2024-02-01 PROCEDURE — 85027 COMPLETE CBC AUTOMATED: CPT

## 2024-02-01 PROCEDURE — 96415 CHEMO IV INFUSION ADDL HR: CPT

## 2024-02-02 ENCOUNTER — OUTPATIENT (OUTPATIENT)
Dept: OUTPATIENT SERVICES | Facility: HOSPITAL | Age: 74
LOS: 1 days | End: 2024-02-02
Payer: MEDICARE

## 2024-02-02 DIAGNOSIS — Z51.11 ENCOUNTER FOR ANTINEOPLASTIC CHEMOTHERAPY: ICD-10-CM

## 2024-02-02 DIAGNOSIS — R11.2 NAUSEA WITH VOMITING, UNSPECIFIED: ICD-10-CM

## 2024-02-02 DIAGNOSIS — C61 MALIGNANT NEOPLASM OF PROSTATE: ICD-10-CM

## 2024-02-02 LAB
ALBUMIN SERPL ELPH-MCNC: 3.7 G/DL — SIGNIFICANT CHANGE UP (ref 3.3–5)
ALP SERPL-CCNC: 113 U/L — SIGNIFICANT CHANGE UP (ref 40–120)
ALT FLD-CCNC: 9 U/L — LOW (ref 10–45)
ANION GAP SERPL CALC-SCNC: 12 MMOL/L — SIGNIFICANT CHANGE UP (ref 5–17)
AST SERPL-CCNC: 16 U/L — SIGNIFICANT CHANGE UP (ref 10–40)
BILIRUB SERPL-MCNC: 0.2 MG/DL — SIGNIFICANT CHANGE UP (ref 0.2–1.2)
BUN SERPL-MCNC: 24 MG/DL — HIGH (ref 7–23)
CALCIUM SERPL-MCNC: 8.6 MG/DL — SIGNIFICANT CHANGE UP (ref 8.4–10.5)
CHLORIDE SERPL-SCNC: 105 MMOL/L — SIGNIFICANT CHANGE UP (ref 96–108)
CO2 SERPL-SCNC: 26 MMOL/L — SIGNIFICANT CHANGE UP (ref 22–31)
CREAT SERPL-MCNC: 0.87 MG/DL — SIGNIFICANT CHANGE UP (ref 0.5–1.3)
EGFR: 92 ML/MIN/1.73M2 — SIGNIFICANT CHANGE UP
GLUCOSE SERPL-MCNC: 119 MG/DL — HIGH (ref 70–99)
MAGNESIUM SERPL-MCNC: 1.9 MG/DL — SIGNIFICANT CHANGE UP (ref 1.6–2.6)
PHOSPHATE SERPL-MCNC: 3 MG/DL — SIGNIFICANT CHANGE UP (ref 2.5–4.5)
POTASSIUM SERPL-MCNC: 3.6 MMOL/L — SIGNIFICANT CHANGE UP (ref 3.5–5.3)
POTASSIUM SERPL-SCNC: 3.6 MMOL/L — SIGNIFICANT CHANGE UP (ref 3.5–5.3)
PROT SERPL-MCNC: 6.1 G/DL — SIGNIFICANT CHANGE UP (ref 6–8.3)
SODIUM SERPL-SCNC: 142 MMOL/L — SIGNIFICANT CHANGE UP (ref 135–145)

## 2024-02-08 ENCOUNTER — APPOINTMENT (OUTPATIENT)
Dept: INFUSION THERAPY | Facility: CANCER CENTER | Age: 74
End: 2024-02-08

## 2024-02-08 ENCOUNTER — RESULT REVIEW (OUTPATIENT)
Age: 74
End: 2024-02-08

## 2024-02-08 ENCOUNTER — APPOINTMENT (OUTPATIENT)
Dept: HEMATOLOGY ONCOLOGY | Facility: CLINIC | Age: 74
End: 2024-02-08
Payer: MEDICARE

## 2024-02-08 DIAGNOSIS — D52.9 FOLATE DEFICIENCY ANEMIA, UNSPECIFIED: ICD-10-CM

## 2024-02-08 LAB
ALBUMIN SERPL ELPH-MCNC: 3.7 G/DL — SIGNIFICANT CHANGE UP (ref 3.3–5)
ALP SERPL-CCNC: 115 U/L — SIGNIFICANT CHANGE UP (ref 40–120)
ALT FLD-CCNC: 10 U/L — SIGNIFICANT CHANGE UP (ref 10–45)
ANION GAP SERPL CALC-SCNC: 9 MMOL/L — SIGNIFICANT CHANGE UP (ref 5–17)
AST SERPL-CCNC: 19 U/L — SIGNIFICANT CHANGE UP (ref 10–40)
BASOPHILS # BLD AUTO: 0.04 K/UL — SIGNIFICANT CHANGE UP (ref 0–0.2)
BASOPHILS NFR BLD AUTO: 0.6 % — SIGNIFICANT CHANGE UP (ref 0–2)
BILIRUB SERPL-MCNC: <0.2 MG/DL — SIGNIFICANT CHANGE UP (ref 0.2–1.2)
BUN SERPL-MCNC: 29 MG/DL — HIGH (ref 7–23)
CALCIUM SERPL-MCNC: 8.7 MG/DL — SIGNIFICANT CHANGE UP (ref 8.4–10.5)
CHLORIDE SERPL-SCNC: 104 MMOL/L — SIGNIFICANT CHANGE UP (ref 96–108)
CO2 SERPL-SCNC: 26 MMOL/L — SIGNIFICANT CHANGE UP (ref 22–31)
CREAT SERPL-MCNC: 0.73 MG/DL — SIGNIFICANT CHANGE UP (ref 0.5–1.3)
EGFR: 97 ML/MIN/1.73M2 — SIGNIFICANT CHANGE UP
EOSINOPHIL # BLD AUTO: 0.22 K/UL — SIGNIFICANT CHANGE UP (ref 0–0.5)
EOSINOPHIL NFR BLD AUTO: 3.4 % — SIGNIFICANT CHANGE UP (ref 0–6)
GLUCOSE SERPL-MCNC: 80 MG/DL — SIGNIFICANT CHANGE UP (ref 70–99)
HCT VFR BLD CALC: 33.3 % — LOW (ref 39–50)
HGB BLD-MCNC: 11.1 G/DL — LOW (ref 13–17)
IMM GRANULOCYTES NFR BLD AUTO: 1.4 % — HIGH (ref 0–0.9)
LYMPHOCYTES # BLD AUTO: 0.95 K/UL — LOW (ref 1–3.3)
LYMPHOCYTES # BLD AUTO: 14.5 % — SIGNIFICANT CHANGE UP (ref 13–44)
MAGNESIUM SERPL-MCNC: 1.8 MG/DL — SIGNIFICANT CHANGE UP (ref 1.6–2.6)
MCHC RBC-ENTMCNC: 30.5 PG — SIGNIFICANT CHANGE UP (ref 27–34)
MCHC RBC-ENTMCNC: 33.3 GM/DL — SIGNIFICANT CHANGE UP (ref 32–36)
MCV RBC AUTO: 91.5 FL — SIGNIFICANT CHANGE UP (ref 80–100)
MONOCYTES # BLD AUTO: 0.4 K/UL — SIGNIFICANT CHANGE UP (ref 0–0.9)
MONOCYTES NFR BLD AUTO: 6.1 % — SIGNIFICANT CHANGE UP (ref 2–14)
NEUTROPHILS # BLD AUTO: 4.86 K/UL — SIGNIFICANT CHANGE UP (ref 1.8–7.4)
NEUTROPHILS NFR BLD AUTO: 74 % — SIGNIFICANT CHANGE UP (ref 43–77)
NRBC # BLD: 0 /100 WBCS — SIGNIFICANT CHANGE UP (ref 0–0)
PHOSPHATE SERPL-MCNC: 3.1 MG/DL — SIGNIFICANT CHANGE UP (ref 2.5–4.5)
PLATELET # BLD AUTO: 222 K/UL — SIGNIFICANT CHANGE UP (ref 150–400)
POTASSIUM SERPL-MCNC: 4.1 MMOL/L — SIGNIFICANT CHANGE UP (ref 3.5–5.3)
POTASSIUM SERPL-SCNC: 4.1 MMOL/L — SIGNIFICANT CHANGE UP (ref 3.5–5.3)
PROT SERPL-MCNC: 6.3 G/DL — SIGNIFICANT CHANGE UP (ref 6–8.3)
PSA FLD-MCNC: 28.8 NG/ML — HIGH (ref 0–4)
RBC # BLD: 3.64 M/UL — LOW (ref 4.2–5.8)
RBC # FLD: 15.9 % — HIGH (ref 10.3–14.5)
SODIUM SERPL-SCNC: 139 MMOL/L — SIGNIFICANT CHANGE UP (ref 135–145)
WBC # BLD: 6.56 K/UL — SIGNIFICANT CHANGE UP (ref 3.8–10.5)
WBC # FLD AUTO: 6.56 K/UL — SIGNIFICANT CHANGE UP (ref 3.8–10.5)

## 2024-02-08 PROCEDURE — 99214 OFFICE O/P EST MOD 30 MIN: CPT

## 2024-02-08 RX ORDER — LIDOCAINE AND PRILOCAINE 25; 25 MG/G; MG/G
2.5-2.5 CREAM TOPICAL
Qty: 1 | Refills: 2 | Status: ACTIVE | COMMUNITY
Start: 2024-02-08 | End: 1900-01-01

## 2024-02-09 NOTE — HISTORY OF PRESENT ILLNESS
[de-identified] : Referred by: Dr. Adrian Kc  Cancer Summary:  DIAGNOSIS: metastatic prostate cancer  PROCEDURE AND DATE: Prostate biopsy 2/1/2022 PATHOLOGY: prostate adenocarcinoma, Idania 8 (4+4) and 9 (4+5) in all cores, +PNI STAGE: IV Chemotherapy: see below Radiation: s/p palliative RT to prostate bed for symptom management  pending RT to progressive spinal mets  Hormonal: lupron initiated on 3/8/22.  Abiraterone 1000mg daily + prednisone 5mg 4/1/22 - 6/2023, now with POD Switch to xtandi 160mg daily 6/22/2023  STATUS: active  Genetics STATUS: [facility] genetic testing sent 6/22/23 - Negative for pathogenic mutations or VUS  Foundation 5/22/23- Foundation testing- PDL1 IC score 0%, TC score 0%, TPS 0%  Mr. Portillo presented at age 71 in February 2022 for evaluation of prostate cancer.  The patient is an active smoker.   Presenting HPI: Sriram was initially referred for evaluation for locally advanced prostate cancer after recently being seen by uro-oncologist Dr. Kc.  He initially presented to Dr. Kc with an elevated PSA. MRI pelvis on 12/9/21 revealed 1 PIRADS 5 lesion with bilateral neurovascular bundle involvement and bilateral seminal vesicle invasion, no pelvic adenopathy. He underwent prostate biopsy on 2/14/22 which revealed high risk prostate cancer in 13/13 cores, idania 8  (4+4) and 9 (4+5). He is currently taking tamsulosin and finasteride. He reported extreme urinary urgency and constipation. He is currently taking dulcolax daily but still having difficulty with bowel movements. He reports he has lost 30lb over the past 2 years 2/2 difficulty eating 2/2 constipation. His bone scan was scheduled and he is ordered for CT chest as well. He was referred to medical oncology and radiation oncology, Dr. Alvarado.   Imaging:  MRI pelvis 12/9/21: 1 PIRADS 5 lesion with bilateral neurovascular bundle involvement and bilateral seminal vesicle invasion, no pelvic adenopathy. NM bone scan 2/24/22: focal increased radiotracer activity in L femoral, R 3rd rib and sacrum, c/f osseous metastatic disease.  CT chest 4/12/22:  liver metastases, R 3rd rib mets, additional possible sclerotic mets, few indeterminate sub-4mm lung nodules.  PSMA PET/CT 6/13/23- reveals focus of intensely increased PSMA expression within the LEFT L3 vertebral body (SUV 22.6) and adjacent to the LEFT L4/L5 facet joint.  Foci of bone tracer seen in L femur, R 3rd rib and sacral regions on prior bone scan do not demonstrate current abnormal PSMA activity.  No abnormal activity at prostatectomy site.  Suspected foci of osseous mets at L3 and left L4/L5 facet joint.  MRI lumbosacral spine 6/28/23- subtle MRI findings at L5-S1 corresponding to focal abnormal PET/CT, concerning for bony metastatic disease, L3 findings suspicious for metastatic disease  HCM:  - COVID vaccination: s/p booster January 2022 - Colonoscopy: done 2 years ago, normal   SH:  - Occupation: previously worked in photo finishes  - Living situation: lives in [place], his wife passed away, he is here with his sister and HCP today (Maylin)  - Smoking/etoh/illicits: he actively smokes 10 cigarettes / day x many years, denies etoh, denies illicits  - Exercise: minimally active   FH:  - No family history of cancer  [de-identified] : Sriram presents for follow up on 2/8/24 for metastatic prostate cancer.  Accompanied by his sister. Lupron/eligard initiated 3/2022.  Abiraterone started 4/1/22. Switched to xtandi 6/22/23 (on HOLD) C1D1 docetaxel 1/4/24 - experienced severe drug reaction C1D1 abraxane 1/11/24 C1D8 abraxane 1/18/24 C2D1 abraxane 2/1/24 C2D8 abraxane 2/8/24  DEXA 6/6/22- normal bone density   At today's visit he reports tolerating treatment very well.  His appetite has improved; very pleased w/ 7 lb wt gain.  Has been losing hair as expected. Denies neuropathy of hands/feet.  He will be receiving C2D8 abraxane.  No longer taking Xtandi; will also receive Lupron today.  PET CT 12/5/23 revealed activity in the center of the prostate, and mild uptake at the left femur.    Laboratory studies reviewed at today's visit and notable for: WBC 6.56, Hb 11.1, plt 222  PSA trend: 25.9 --> 14.6 --> 2.23 --> 0.21 --> 0.09 --> 0.08 -> 0.06 --> 0.50 --> 3.54 -- > 8.15 --> 6.93 --> 9.48 (11/17/23) --> 12.5 (1/10/24)  Await PSA results from today

## 2024-02-09 NOTE — PHYSICAL EXAM
[Restricted in physically strenuous activity but ambulatory and able to carry out work of a light or sedentary nature] : Status 1- Restricted in physically strenuous activity but ambulatory and able to carry out work of a light or sedentary nature, e.g., light house work, office work [Normal] : affect appropriate [de-identified] : thin male, pleasant, NAD [de-identified] : Right chest wall port site healed well, no erythema or fluctuance

## 2024-02-09 NOTE — RESULTS/DATA
[FreeTextEntry1] : Mr. Portillo initially presented at age 71 in February 2022 for evaluation of locally advanced prostate cancer.  The patient has a medical history of active smoking.   Bone scan concerning for osseous metastatic disease. Patient remains asymptomatic and without bone pain.  CT chest revealed liver mets, bone mets. Small pulmonary nodules.  Initiated eligard + abiraterone/pred from 4/2022-6/2023 with initial good response, with POD w/ new spinal mets in 6/2023.  He is now s/p RT x 10 fractions to the spine. Denies back pain.  Initiated enzalutamide (xtandi) 160mg daily on 6/22/23- appetite and weight are improving.  PSA continued to rise slowly.  PET/CT showed new focus of activity in the prostate gland and possible bone activity.  Was switched from xtandi to docetaxel 75mg/m2 q 3 weeks and unfortunately experienced a severe drug reaction at the start of C1.  - Switched to abraxane (D1/D8 q 21 days) w/ good tolerance - proceed w/ C2D8 today.   - Counts stable, monitor anemia Discussed Xgeva for castrate resistant disease- reports terrible dental health. Has many decayed/broken teeth. Unlikely to be a candidate for xgeva. Dental letter provided.  DEXA normal. Recommend calcium and vitamin D.  Continue follow with uro-oncology Dr. Kc. Continue tamsulosin and finasteride.  We will trend PSA (free and total), testosterone levels  All patient questions answered at today's visit. Patient urged to call the office with any questions or concerns.

## 2024-02-15 ENCOUNTER — APPOINTMENT (OUTPATIENT)
Dept: INFUSION THERAPY | Facility: CANCER CENTER | Age: 74
End: 2024-02-15

## 2024-02-15 ENCOUNTER — APPOINTMENT (OUTPATIENT)
Dept: HEMATOLOGY ONCOLOGY | Facility: CLINIC | Age: 74
End: 2024-02-15

## 2024-02-22 ENCOUNTER — RESULT REVIEW (OUTPATIENT)
Age: 74
End: 2024-02-22

## 2024-02-22 ENCOUNTER — APPOINTMENT (OUTPATIENT)
Dept: INFUSION THERAPY | Facility: CANCER CENTER | Age: 74
End: 2024-02-22

## 2024-02-22 ENCOUNTER — APPOINTMENT (OUTPATIENT)
Dept: HEMATOLOGY ONCOLOGY | Facility: CLINIC | Age: 74
End: 2024-02-22
Payer: MEDICARE

## 2024-02-22 VITALS
SYSTOLIC BLOOD PRESSURE: 148 MMHG | HEART RATE: 87 BPM | TEMPERATURE: 97.3 F | HEIGHT: 73.31 IN | WEIGHT: 158.56 LBS | RESPIRATION RATE: 18 BRPM | DIASTOLIC BLOOD PRESSURE: 84 MMHG | BODY MASS INDEX: 20.79 KG/M2 | OXYGEN SATURATION: 99 %

## 2024-02-22 LAB
BASOPHILS # BLD AUTO: 0.03 K/UL — SIGNIFICANT CHANGE UP (ref 0–0.2)
BASOPHILS NFR BLD AUTO: 0.4 % — SIGNIFICANT CHANGE UP (ref 0–2)
EOSINOPHIL # BLD AUTO: 0.18 K/UL — SIGNIFICANT CHANGE UP (ref 0–0.5)
EOSINOPHIL NFR BLD AUTO: 2.5 % — SIGNIFICANT CHANGE UP (ref 0–6)
HCT VFR BLD CALC: 34.5 % — LOW (ref 39–50)
HGB BLD-MCNC: 11.5 G/DL — LOW (ref 13–17)
IMM GRANULOCYTES NFR BLD AUTO: 0.4 % — SIGNIFICANT CHANGE UP (ref 0–0.9)
LYMPHOCYTES # BLD AUTO: 0.98 K/UL — LOW (ref 1–3.3)
LYMPHOCYTES # BLD AUTO: 13.8 % — SIGNIFICANT CHANGE UP (ref 13–44)
MCHC RBC-ENTMCNC: 30.9 PG — SIGNIFICANT CHANGE UP (ref 27–34)
MCHC RBC-ENTMCNC: 33.3 GM/DL — SIGNIFICANT CHANGE UP (ref 32–36)
MCV RBC AUTO: 92.7 FL — SIGNIFICANT CHANGE UP (ref 80–100)
MONOCYTES # BLD AUTO: 0.77 K/UL — SIGNIFICANT CHANGE UP (ref 0–0.9)
MONOCYTES NFR BLD AUTO: 10.8 % — SIGNIFICANT CHANGE UP (ref 2–14)
NEUTROPHILS # BLD AUTO: 5.13 K/UL — SIGNIFICANT CHANGE UP (ref 1.8–7.4)
NEUTROPHILS NFR BLD AUTO: 72.1 % — SIGNIFICANT CHANGE UP (ref 43–77)
NRBC # BLD: 0 /100 WBCS — SIGNIFICANT CHANGE UP (ref 0–0)
PLATELET # BLD AUTO: 220 K/UL — SIGNIFICANT CHANGE UP (ref 150–400)
RBC # BLD: 3.72 M/UL — LOW (ref 4.2–5.8)
RBC # FLD: 18.3 % — HIGH (ref 10.3–14.5)
WBC # BLD: 7.12 K/UL — SIGNIFICANT CHANGE UP (ref 3.8–10.5)
WBC # FLD AUTO: 7.12 K/UL — SIGNIFICANT CHANGE UP (ref 3.8–10.5)

## 2024-02-22 PROCEDURE — 99214 OFFICE O/P EST MOD 30 MIN: CPT

## 2024-02-22 PROCEDURE — G2211 COMPLEX E/M VISIT ADD ON: CPT

## 2024-02-23 LAB
ALBUMIN SERPL ELPH-MCNC: 3.9 G/DL — SIGNIFICANT CHANGE UP (ref 3.3–5)
ALP SERPL-CCNC: 111 U/L — SIGNIFICANT CHANGE UP (ref 40–120)
ALT FLD-CCNC: 11 U/L — SIGNIFICANT CHANGE UP (ref 10–45)
ANION GAP SERPL CALC-SCNC: 13 MMOL/L — SIGNIFICANT CHANGE UP (ref 5–17)
AST SERPL-CCNC: 19 U/L — SIGNIFICANT CHANGE UP (ref 10–40)
BILIRUB SERPL-MCNC: 0.3 MG/DL — SIGNIFICANT CHANGE UP (ref 0.2–1.2)
BUN SERPL-MCNC: 27 MG/DL — HIGH (ref 7–23)
CALCIUM SERPL-MCNC: 8.7 MG/DL — SIGNIFICANT CHANGE UP (ref 8.4–10.5)
CHLORIDE SERPL-SCNC: 103 MMOL/L — SIGNIFICANT CHANGE UP (ref 96–108)
CO2 SERPL-SCNC: 23 MMOL/L — SIGNIFICANT CHANGE UP (ref 22–31)
CREAT SERPL-MCNC: 0.81 MG/DL — SIGNIFICANT CHANGE UP (ref 0.5–1.3)
EGFR: 94 ML/MIN/1.73M2 — SIGNIFICANT CHANGE UP
GLUCOSE SERPL-MCNC: 90 MG/DL — SIGNIFICANT CHANGE UP (ref 70–99)
MAGNESIUM SERPL-MCNC: 2 MG/DL — SIGNIFICANT CHANGE UP (ref 1.6–2.6)
PHOSPHATE SERPL-MCNC: 3.8 MG/DL — SIGNIFICANT CHANGE UP (ref 2.5–4.5)
POTASSIUM SERPL-MCNC: 4 MMOL/L — SIGNIFICANT CHANGE UP (ref 3.5–5.3)
POTASSIUM SERPL-SCNC: 4 MMOL/L — SIGNIFICANT CHANGE UP (ref 3.5–5.3)
PROT SERPL-MCNC: 6.2 G/DL — SIGNIFICANT CHANGE UP (ref 6–8.3)
SODIUM SERPL-SCNC: 139 MMOL/L — SIGNIFICANT CHANGE UP (ref 135–145)

## 2024-02-23 NOTE — PHYSICAL EXAM
[Restricted in physically strenuous activity but ambulatory and able to carry out work of a light or sedentary nature] : Status 1- Restricted in physically strenuous activity but ambulatory and able to carry out work of a light or sedentary nature, e.g., light house work, office work [de-identified] : thin male, pleasant, NAD [Normal] : affect appropriate

## 2024-02-23 NOTE — RESULTS/DATA
[FreeTextEntry1] : Mr. Portillo initially presented at age 71 in February 2022 for evaluation of locally advanced prostate cancer.  The patient has a medical history of active smoking.   Bone scan concerning for osseous metastatic disease. Patient remains asymptomatic and without bone pain.  CT chest revealed liver mets, bone mets. Small pulmonary nodules.  Initiated eligard + abiraterone/pred from 4/2022-6/2023 with initial good response, with POD w/ new spinal mets in 6/2023.  He is now s/p RT x 10 fractions to the spine. Denies back pain.  Initiated enzalutamide (xtandi) 160mg daily on 6/22/23- ultimately PSA increased and PET/CT revealed POD. PET/CT 12/2023 shows new focus of activity in the prostate gland and possible bone activity.  Initiated docetaxel w/ severe infusion reaction, switched to abraxane, which he is tolerating well.  Here today for C3D1 abraxane.   No longer taking Xtandi.  PSA 2/8/24 was 28.80, will continue to monitor, clinically he feels well.  Discussed Xgeva for castrate resistant disease- reports terrible dental health. Has many decayed/broken teeth. Unlikely to be a candidate for xgeva. Dental letter provided.  DEXA normal. Recommend calcium and vitamin D.  Continue follow with uro-oncology Dr. Kc. Continue tamsulosin and finasteride.  We will trend PSA (free and total), testosterone levels q 3 months.  All patient questions answered at today's visit. Patient urged to call the office with any questions or concerns.   I personally have spent a total of 35 minutes of time on the date of this encounter reviewing test results, documenting findings, coordinating care and directly consulting with the patient and/or designated family member. Greater than 50% of the face to face encounter time was spent on counseling and/or coordination of care for prostate cancer.

## 2024-02-23 NOTE — HISTORY OF PRESENT ILLNESS
[de-identified] : Referred by: Dr. Adrian Kc  Cancer Summary:  DIAGNOSIS: metastatic prostate cancer  PROCEDURE AND DATE: Prostate biopsy 2/1/2022 PATHOLOGY: prostate adenocarcinoma, Idania 8 (4+4) and 9 (4+5) in all cores, +PNI STAGE: IV Chemotherapy: TBD  Radiation: s/p palliative RT to prostate bed for symptom management  pending RT to progressive spinal mets  Hormonal: lupron initiated on 3/8/22.  Abiraterone 1000mg daily + prednisone 5mg 4/1/22 - 6/2023, now with POD Switch to xtandi 160mg daily 6/22/2023  C1D1 docetaxel 1/4/24 - experienced severe drug reaction C1D1 abraxane 1/11/24 C1D8 abraxane 1/18/24 C2D1 abraxane 2/1/24 C2D8 abraxane 2/8/24 C3D1 abraxane 2/22/24  STATUS: active  Genetics STATUS: [facility] genetic testing sent 6/22/23 - Negative for pathogenic mutations or VUS  Foundation 5/22/23- Foundation testing- PDL1 IC score 0%, TC score 0%, TPS 0%  Mr. Portillo presented at age 71 in February 2022 for evaluation of prostate cancer.  The patient is an active smoker.   Presenting HPI: Sriram was sent in for evaluation for locally advanced prostate cancer after recently being seen by uro-oncologist Dr. Kc.  He initially presented to Dr. Kc with an elevated PSA. MRI pelvis on 12/9/21 revealed 1 PIRADS 5 lesion with bilateral neurovascular bundle involvement and bilateral seminal vesicle invasion, no pelvic adenopathy. He underwent prostate biopsy on 2/14/22 which revealed high risk prostate cancer in 13/13 cores, idania 8  (4+4) and 9 (4+5). He is currently taking tamsulosin and finasteride. He reports extreme urinary urgency and constipation. He is currently taking dulcolax daily but still having difficulty with bowel movements. He reports he has lost 30lb over the past 2 years 2/2 difficulty eating 2/2 constipation. His bone scan is scheduled and he is ordered for CT chest as well. He was referred to medical oncology and radiation oncology, Dr. Alvarado.   Imaging:  MRI pelvis 12/9/21: 1 PIRADS 5 lesion with bilateral neurovascular bundle involvement and bilateral seminal vesicle invasion, no pelvic adenopathy. NM bone scan 2/24/22: focal increased radiotracer activity in L femoral, R 3rd rib and sacrum, c/f osseous metastatic disease.  CT chest 4/12/22:  liver metastases, R 3rd rib mets, additional possible sclerotic mets, few indeterminate sub-4mm lung nodules.  PSMA PET/CT 6/13/23- reveals focus of intensely increased PSMA expression within the LEFT L3 vertebral body (SUV 22.6) and adjacent to the LEFT L4/L5 facet joint.  Foci of bone tracer seen in L femur, R 3rd rib and sacral regions on prior bone scan do not demonstrate current abnormal PSMA activity.  No abnormal activity at prostatectomy site.  Suspected foci of osseous mets at L3 and left L4/L5 facet joint.  MRI lumbosacral spine 6/28/23- subtle MRI findings at L5-S1 corresponding to focal abnormal PET/CT, concerning for bony metastatic disease, L3 findings suspicious for metastatic disease  HCM:  - COVID vaccination: s/p booster January 2022 - Colonoscopy: done 2 years ago, normal   SH:  - Occupation: previously worked in photo finishes  - Living situation: lives in [place], his wife passed away, he is here with his sister and HCP today (Maylin)  - Smoking/etoh/illicits: he actively smokes 10 cigarettes / day x many years, denies etoh, denies illicits  - Exercise: minimally active   FH:  - No family history of cancer  [de-identified] : Sriram presents for follow up on 2/22/24 for metastatic prostate cancer.  Lupron/eligard initiated 3/2022.  Abiraterone started 4/1/22. Switched to xtandi 6/22/23.  DEXA 6/6/22- normal bone density  Initiated abraxane on 1/11/24.   Here today for C3D1 abraxane 2/22/24. No longer taking Xtandi.  PSA 2/8/24 was 28.80  At today's visit he states he is doing very well on chemotherapy, denies any major side effects. He has gained ~10lb and is pleased with this.  He continues to have difficulty with his bowel movements, remains constipated.   PET/CT 12/5/23 IMPRESSION: 1.  Activity in the center of the prostate gland, new since prior examination, borderline low-moderate quantitative PSMA expression. Consider repeat MRI of the prostate gland. 2.  Quantitatively improved activity at LEFT L3 vertebral body and LEFT L4/L5 facet joint region, remains quantitatively high expression. Stable mild uptake at proximal LEFT femur is indeterminate for active disease, quantitatively low PSMA expression; MRI may be helpful.  Laboratory studies reviewed at today's visit and notable for: WBC 7.12, Hb 11.5, plt 220  PSA trend: 25.9 --> 14.6 --> 2.23 --> 0.21 --> 0.09 --> 0.08 -> 0.06 --> 0.50 --> 3.54 -- > 8.15 --> 6.93 --> 9.48 -->  28.80

## 2024-02-24 LAB — TESTOST FREE+TOTAL PANEL SERPL-MCNC: <2.5 NG/DL — LOW (ref 300–740)

## 2024-02-27 LAB — TESTOST FREE SERPL-MCNC: 0.2 PG/ML — LOW (ref 5.9–27)

## 2024-02-29 ENCOUNTER — RESULT REVIEW (OUTPATIENT)
Age: 74
End: 2024-02-29

## 2024-02-29 ENCOUNTER — APPOINTMENT (OUTPATIENT)
Dept: INFUSION THERAPY | Facility: CANCER CENTER | Age: 74
End: 2024-02-29

## 2024-02-29 LAB
BASOPHILS # BLD AUTO: 0.05 K/UL — SIGNIFICANT CHANGE UP (ref 0–0.2)
BASOPHILS NFR BLD AUTO: 0.7 % — SIGNIFICANT CHANGE UP (ref 0–2)
EOSINOPHIL # BLD AUTO: 0.27 K/UL — SIGNIFICANT CHANGE UP (ref 0–0.5)
EOSINOPHIL NFR BLD AUTO: 3.7 % — SIGNIFICANT CHANGE UP (ref 0–6)
HCT VFR BLD CALC: 33.4 % — LOW (ref 39–50)
HGB BLD-MCNC: 11.3 G/DL — LOW (ref 13–17)
IMM GRANULOCYTES NFR BLD AUTO: 1.2 % — HIGH (ref 0–0.9)
LYMPHOCYTES # BLD AUTO: 1.05 K/UL — SIGNIFICANT CHANGE UP (ref 1–3.3)
LYMPHOCYTES # BLD AUTO: 14.4 % — SIGNIFICANT CHANGE UP (ref 13–44)
MCHC RBC-ENTMCNC: 31.3 PG — SIGNIFICANT CHANGE UP (ref 27–34)
MCHC RBC-ENTMCNC: 33.8 GM/DL — SIGNIFICANT CHANGE UP (ref 32–36)
MCV RBC AUTO: 92.5 FL — SIGNIFICANT CHANGE UP (ref 80–100)
MONOCYTES # BLD AUTO: 0.47 K/UL — SIGNIFICANT CHANGE UP (ref 0–0.9)
MONOCYTES NFR BLD AUTO: 6.5 % — SIGNIFICANT CHANGE UP (ref 2–14)
NEUTROPHILS # BLD AUTO: 5.34 K/UL — SIGNIFICANT CHANGE UP (ref 1.8–7.4)
NEUTROPHILS NFR BLD AUTO: 73.5 % — SIGNIFICANT CHANGE UP (ref 43–77)
NRBC # BLD: 0 /100 WBCS — SIGNIFICANT CHANGE UP (ref 0–0)
PLATELET # BLD AUTO: 205 K/UL — SIGNIFICANT CHANGE UP (ref 150–400)
RBC # BLD: 3.61 M/UL — LOW (ref 4.2–5.8)
RBC # FLD: 17.9 % — HIGH (ref 10.3–14.5)
WBC # BLD: 7.27 K/UL — SIGNIFICANT CHANGE UP (ref 3.8–10.5)
WBC # FLD AUTO: 7.27 K/UL — SIGNIFICANT CHANGE UP (ref 3.8–10.5)

## 2024-03-01 LAB
ALBUMIN SERPL ELPH-MCNC: 3.6 G/DL — SIGNIFICANT CHANGE UP (ref 3.3–5)
ALP SERPL-CCNC: 115 U/L — SIGNIFICANT CHANGE UP (ref 40–120)
ALT FLD-CCNC: 12 U/L — SIGNIFICANT CHANGE UP (ref 10–45)
ANION GAP SERPL CALC-SCNC: 14 MMOL/L — SIGNIFICANT CHANGE UP (ref 5–17)
AST SERPL-CCNC: 18 U/L — SIGNIFICANT CHANGE UP (ref 10–40)
BILIRUB SERPL-MCNC: 0.2 MG/DL — SIGNIFICANT CHANGE UP (ref 0.2–1.2)
BUN SERPL-MCNC: 26 MG/DL — HIGH (ref 7–23)
CALCIUM SERPL-MCNC: 8.9 MG/DL — SIGNIFICANT CHANGE UP (ref 8.4–10.5)
CHLORIDE SERPL-SCNC: 104 MMOL/L — SIGNIFICANT CHANGE UP (ref 96–108)
CO2 SERPL-SCNC: 23 MMOL/L — SIGNIFICANT CHANGE UP (ref 22–31)
CREAT SERPL-MCNC: 0.77 MG/DL — SIGNIFICANT CHANGE UP (ref 0.5–1.3)
EGFR: 95 ML/MIN/1.73M2 — SIGNIFICANT CHANGE UP
GLUCOSE SERPL-MCNC: 85 MG/DL — SIGNIFICANT CHANGE UP (ref 70–99)
MAGNESIUM SERPL-MCNC: 1.9 MG/DL — SIGNIFICANT CHANGE UP (ref 1.6–2.6)
PHOSPHATE SERPL-MCNC: 3.6 MG/DL — SIGNIFICANT CHANGE UP (ref 2.5–4.5)
POTASSIUM SERPL-MCNC: 3.9 MMOL/L — SIGNIFICANT CHANGE UP (ref 3.5–5.3)
POTASSIUM SERPL-SCNC: 3.9 MMOL/L — SIGNIFICANT CHANGE UP (ref 3.5–5.3)
PROT SERPL-MCNC: 6 G/DL — SIGNIFICANT CHANGE UP (ref 6–8.3)
SODIUM SERPL-SCNC: 141 MMOL/L — SIGNIFICANT CHANGE UP (ref 135–145)

## 2024-03-14 ENCOUNTER — RESULT REVIEW (OUTPATIENT)
Age: 74
End: 2024-03-14

## 2024-03-14 ENCOUNTER — APPOINTMENT (OUTPATIENT)
Dept: INFUSION THERAPY | Facility: CANCER CENTER | Age: 74
End: 2024-03-14

## 2024-03-14 LAB
BASOPHILS # BLD AUTO: 0.03 K/UL — SIGNIFICANT CHANGE UP (ref 0–0.2)
BASOPHILS NFR BLD AUTO: 0.4 % — SIGNIFICANT CHANGE UP (ref 0–2)
EOSINOPHIL # BLD AUTO: 0.15 K/UL — SIGNIFICANT CHANGE UP (ref 0–0.5)
EOSINOPHIL NFR BLD AUTO: 2.1 % — SIGNIFICANT CHANGE UP (ref 0–6)
HCT VFR BLD CALC: 34 % — LOW (ref 39–50)
HGB BLD-MCNC: 11.4 G/DL — LOW (ref 13–17)
IMM GRANULOCYTES NFR BLD AUTO: 0.4 % — SIGNIFICANT CHANGE UP (ref 0–0.9)
LYMPHOCYTES # BLD AUTO: 0.86 K/UL — LOW (ref 1–3.3)
LYMPHOCYTES # BLD AUTO: 11.9 % — LOW (ref 13–44)
MCHC RBC-ENTMCNC: 31.2 PG — SIGNIFICANT CHANGE UP (ref 27–34)
MCHC RBC-ENTMCNC: 33.5 GM/DL — SIGNIFICANT CHANGE UP (ref 32–36)
MCV RBC AUTO: 93.2 FL — SIGNIFICANT CHANGE UP (ref 80–100)
MONOCYTES # BLD AUTO: 0.75 K/UL — SIGNIFICANT CHANGE UP (ref 0–0.9)
MONOCYTES NFR BLD AUTO: 10.3 % — SIGNIFICANT CHANGE UP (ref 2–14)
NEUTROPHILS # BLD AUTO: 5.43 K/UL — SIGNIFICANT CHANGE UP (ref 1.8–7.4)
NEUTROPHILS NFR BLD AUTO: 74.9 % — SIGNIFICANT CHANGE UP (ref 43–77)
NRBC # BLD: 0 /100 WBCS — SIGNIFICANT CHANGE UP (ref 0–0)
PLATELET # BLD AUTO: 218 K/UL — SIGNIFICANT CHANGE UP (ref 150–400)
RBC # BLD: 3.65 M/UL — LOW (ref 4.2–5.8)
RBC # FLD: 19.4 % — HIGH (ref 10.3–14.5)
WBC # BLD: 7.25 K/UL — SIGNIFICANT CHANGE UP (ref 3.8–10.5)
WBC # FLD AUTO: 7.25 K/UL — SIGNIFICANT CHANGE UP (ref 3.8–10.5)

## 2024-03-14 PROCEDURE — 84403 ASSAY OF TOTAL TESTOSTERONE: CPT

## 2024-03-14 PROCEDURE — 85027 COMPLETE CBC AUTOMATED: CPT

## 2024-03-14 PROCEDURE — G0103: CPT

## 2024-03-14 PROCEDURE — 96401 CHEMO ANTI-NEOPL SQ/IM: CPT

## 2024-03-14 PROCEDURE — 83735 ASSAY OF MAGNESIUM: CPT

## 2024-03-14 PROCEDURE — 80053 COMPREHEN METABOLIC PANEL: CPT

## 2024-03-14 PROCEDURE — 96413 CHEMO IV INFUSION 1 HR: CPT

## 2024-03-14 PROCEDURE — 84100 ASSAY OF PHOSPHORUS: CPT

## 2024-03-15 LAB
ALBUMIN SERPL ELPH-MCNC: 3.4 G/DL — SIGNIFICANT CHANGE UP (ref 3.3–5)
ALP SERPL-CCNC: 105 U/L — SIGNIFICANT CHANGE UP (ref 40–120)
ALT FLD-CCNC: 12 U/L — SIGNIFICANT CHANGE UP (ref 10–45)
ANION GAP SERPL CALC-SCNC: 13 MMOL/L — SIGNIFICANT CHANGE UP (ref 5–17)
AST SERPL-CCNC: 18 U/L — SIGNIFICANT CHANGE UP (ref 10–40)
BILIRUB SERPL-MCNC: 0.2 MG/DL — SIGNIFICANT CHANGE UP (ref 0.2–1.2)
BUN SERPL-MCNC: 30 MG/DL — HIGH (ref 7–23)
CALCIUM SERPL-MCNC: 8.8 MG/DL — SIGNIFICANT CHANGE UP (ref 8.4–10.5)
CHLORIDE SERPL-SCNC: 104 MMOL/L — SIGNIFICANT CHANGE UP (ref 96–108)
CO2 SERPL-SCNC: 23 MMOL/L — SIGNIFICANT CHANGE UP (ref 22–31)
CREAT SERPL-MCNC: 0.91 MG/DL — SIGNIFICANT CHANGE UP (ref 0.5–1.3)
EGFR: 89 ML/MIN/1.73M2 — SIGNIFICANT CHANGE UP
GLUCOSE SERPL-MCNC: 124 MG/DL — HIGH (ref 70–99)
MAGNESIUM SERPL-MCNC: 1.9 MG/DL — SIGNIFICANT CHANGE UP (ref 1.6–2.6)
PHOSPHATE SERPL-MCNC: 3.7 MG/DL — SIGNIFICANT CHANGE UP (ref 2.5–4.5)
POTASSIUM SERPL-MCNC: 3.8 MMOL/L — SIGNIFICANT CHANGE UP (ref 3.5–5.3)
POTASSIUM SERPL-SCNC: 3.8 MMOL/L — SIGNIFICANT CHANGE UP (ref 3.5–5.3)
PROT SERPL-MCNC: 5.9 G/DL — LOW (ref 6–8.3)
PSA FLD-MCNC: 33.6 NG/ML — HIGH (ref 0–4)
SODIUM SERPL-SCNC: 141 MMOL/L — SIGNIFICANT CHANGE UP (ref 135–145)
TESTOST FREE+TOTAL PANEL SERPL-MCNC: <2.5 NG/DL — LOW (ref 300–740)

## 2024-03-21 ENCOUNTER — APPOINTMENT (OUTPATIENT)
Age: 74
End: 2024-03-21
Payer: MEDICARE

## 2024-03-21 ENCOUNTER — APPOINTMENT (OUTPATIENT)
Dept: INFUSION THERAPY | Facility: CANCER CENTER | Age: 74
End: 2024-03-21

## 2024-03-21 VITALS
HEART RATE: 81 BPM | OXYGEN SATURATION: 100 % | TEMPERATURE: 98.2 F | WEIGHT: 157.4 LBS | SYSTOLIC BLOOD PRESSURE: 177 MMHG | BODY MASS INDEX: 20.59 KG/M2 | DIASTOLIC BLOOD PRESSURE: 93 MMHG

## 2024-03-21 PROCEDURE — G2211 COMPLEX E/M VISIT ADD ON: CPT

## 2024-03-21 PROCEDURE — 99215 OFFICE O/P EST HI 40 MIN: CPT

## 2024-03-21 NOTE — HISTORY OF PRESENT ILLNESS
[de-identified] : Referred by: Dr. Adrian Kc  Cancer Summary:  DIAGNOSIS: metastatic prostate cancer  PROCEDURE AND DATE: Prostate biopsy 2/1/2022 PATHOLOGY: prostate adenocarcinoma, Idania 8 (4+4) and 9 (4+5) in all cores, +PNI STAGE: IV Chemotherapy: TBD  Radiation: s/p palliative RT to prostate bed for symptom management  pending RT to progressive spinal mets  Hormonal: lupron initiated on 3/8/22.  Abiraterone 1000mg daily + prednisone 5mg 4/1/22 - 6/2023, now with POD Switch to xtandi 160mg daily 6/22/2023  C1D1 docetaxel 1/4/24 - experienced severe drug reaction C1D1 abraxane 1/11/24 C1D8 abraxane 1/18/24 C2D1 abraxane 2/1/24 C2D8 abraxane 2/8/24 C3D1 abraxane 2/22/24  C3D8 abraxane 2/29/24 STATUS: active  Genetics STATUS: [facility] genetic testing sent 6/22/23 - Negative for pathogenic mutations or VUS  Foundation 5/22/23- Foundation testing- PDL1 IC score 0%, TC score 0%, TPS 0%  Mr. Portillo presented at age 71 in February 2022 for evaluation of prostate cancer.  The patient is an active smoker.   Presenting HPI: Sriram was sent in for evaluation for locally advanced prostate cancer after recently being seen by uro-oncologist Dr. Kc.  He initially presented to Dr. Kc with an elevated PSA. MRI pelvis on 12/9/21 revealed 1 PIRADS 5 lesion with bilateral neurovascular bundle involvement and bilateral seminal vesicle invasion, no pelvic adenopathy. He underwent prostate biopsy on 2/14/22 which revealed high risk prostate cancer in 13/13 cores, idania 8  (4+4) and 9 (4+5). He is currently taking tamsulosin and finasteride. He reports extreme urinary urgency and constipation. He is currently taking dulcolax daily but still having difficulty with bowel movements. He reports he has lost 30lb over the past 2 years 2/2 difficulty eating 2/2 constipation. His bone scan is scheduled and he is ordered for CT chest as well. He was referred to medical oncology and radiation oncology, Dr. Alvarado.   Imaging:  MRI pelvis 12/9/21: 1 PIRADS 5 lesion with bilateral neurovascular bundle involvement and bilateral seminal vesicle invasion, no pelvic adenopathy. NM bone scan 2/24/22: focal increased radiotracer activity in L femoral, R 3rd rib and sacrum, c/f osseous metastatic disease.  CT chest 4/12/22:  liver metastases, R 3rd rib mets, additional possible sclerotic mets, few indeterminate sub-4mm lung nodules.  PSMA PET/CT 6/13/23- reveals focus of intensely increased PSMA expression within the LEFT L3 vertebral body (SUV 22.6) and adjacent to the LEFT L4/L5 facet joint.  Foci of bone tracer seen in L femur, R 3rd rib and sacral regions on prior bone scan do not demonstrate current abnormal PSMA activity.  No abnormal activity at prostatectomy site.  Suspected foci of osseous mets at L3 and left L4/L5 facet joint.  MRI lumbosacral spine 6/28/23- subtle MRI findings at L5-S1 corresponding to focal abnormal PET/CT, concerning for bony metastatic disease, L3 findings suspicious for metastatic disease  HCM:  - COVID vaccination: s/p booster January 2022 - Colonoscopy: done 2 years ago, normal   SH:  - Occupation: previously worked in photo finishes  - Living situation: lives in [place], his wife passed away, he is here with his sister and HCP today (Maylin)  - Smoking/etoh/illicits: he actively smokes 10 cigarettes / day x many years, denies etoh, denies illicits  - Exercise: minimally active   FH:  - No family history of cancer  [de-identified] : Sriram presents for follow up on 3/21/24 for metastatic prostate cancer.  Lupron/eligard initiated 3/2022.  Abiraterone started 4/1/22. Switched to xtandi 6/22/23.  DEXA 6/6/22- normal bone density  Initiated abraxane on 1/11/24.   At today's visit Sriram states that he is having significant pain radiating down his right leg for the past 6 weeks. He is taking motrin and using hot compresses which are not helping. He was seen at urgent care and prescribed 20mg daily x 3 days.  PSA continues to rise to 33.6 despite chemotherapy.  He continues to have difficulty with his bowel movements, remains constipated.   PET/CT 12/5/23 IMPRESSION: 1.  Activity in the center of the prostate gland, new since prior examination, borderline low-moderate quantitative PSMA expression. Consider repeat MRI of the prostate gland. 2.  Quantitatively improved activity at LEFT L3 vertebral body and LEFT L4/L5 facet joint region, remains quantitatively high expression. Stable mild uptake at proximal LEFT femur is indeterminate for active disease, quantitatively low PSMA expression; MRI may be helpful.  PSA trend: 25.9 --> 14.6 --> 2.23 --> 0.21 --> 0.09 --> 0.08 -> 0.06 --> 0.50 --> 3.54 -- > 8.15 --> 6.93 --> 9.48 -->  28.80 --> 33

## 2024-03-21 NOTE — PHYSICAL EXAM
[Restricted in physically strenuous activity but ambulatory and able to carry out work of a light or sedentary nature] : Status 1- Restricted in physically strenuous activity but ambulatory and able to carry out work of a light or sedentary nature, e.g., light house work, office work [Normal] : affect appropriate [de-identified] : significant tenderness of R hip [de-identified] : thin male, pleasant, NAD

## 2024-03-21 NOTE — RESULTS/DATA
[FreeTextEntry1] : Mr. Portillo initially presented at age 71 in February 2022 for evaluation of locally advanced prostate cancer.  The patient has a medical history of active smoking.   Bone scan concerning for osseous metastatic disease. CT chest revealed liver mets, bone mets. Small pulmonary nodules.  Initiated eligard + abiraterone/pred from 4/2022-6/2023 with initial good response, with POD w/ new spinal mets in 6/2023.  He is now s/p RT x 10 fractions to the spine. Denies back pain.  Initiated enzalutamide (xtandi) 160mg daily on 6/22/23- ultimately PSA increased and PET/CT revealed POD. PET/CT 12/2023 shows new focus of activity in the prostate gland and possible bone activity.  Initiated docetaxel w/ severe infusion reaction, switched to abraxane, which he is tolerating well.  Switched to abraxane (D1/D8 q 21 days) and completed 3 cycles of therapy.  PSA rising while on abraxane, likely c/f POD. Will obtain repeat PSMA PET/CT at this time to confirm. Hold abraxane at this time.  Will also arrange MRI R hip for significant pain. Start tramadol 50mg TID PRN for pain control.  Will refer to Dr. Alfredo.  Discussed w/ Dr. Allen- he may be a candidate for pluvicto. The patient is interested in this option. Referral placed to nuclear medicine.  May benefit from targeted RT depending on results of the PSMA PET/CT and MRI.  Discussed Xgeva for castrate resistant disease- reports terrible dental health. Has many decayed/broken teeth. Unlikely to be a candidate for xgeva. Dental letter provided.  DEXA normal. Recommend calcium and vitamin D.  Continue follow with uro-oncology Dr. Kc. Continue tamsulosin and finasteride.  We will trend PSA (free and total), testosterone levels q 3 months.  All patient questions answered at today's visit. Patient urged to call the office with any questions or concerns.   I personally have spent a total of 45 minutes of time on the date of this encounter reviewing test results, documenting findings, coordinating care and directly consulting with the patient and/or designated family member. Greater than 50% of the face to face encounter time was spent on counseling and/or coordination of care for prostate cancer.

## 2024-03-27 ENCOUNTER — RESULT REVIEW (OUTPATIENT)
Age: 74
End: 2024-03-27

## 2024-03-27 ENCOUNTER — APPOINTMENT (OUTPATIENT)
Dept: NUCLEAR MEDICINE | Facility: CLINIC | Age: 74
End: 2024-03-27
Payer: MEDICARE

## 2024-03-27 PROCEDURE — 78816 PET IMAGE W/CT FULL BODY: CPT | Mod: PI

## 2024-03-27 PROCEDURE — A9595: CPT

## 2024-03-29 ENCOUNTER — APPOINTMENT (OUTPATIENT)
Dept: INFUSION THERAPY | Facility: CANCER CENTER | Age: 74
End: 2024-03-29

## 2024-03-29 ENCOUNTER — APPOINTMENT (OUTPATIENT)
Dept: NUCLEAR MEDICINE | Facility: HOSPITAL | Age: 74
End: 2024-03-29

## 2024-04-02 ENCOUNTER — APPOINTMENT (OUTPATIENT)
Dept: PHYSICAL MEDICINE AND REHAB | Facility: CLINIC | Age: 74
End: 2024-04-02
Payer: MEDICARE

## 2024-04-02 VITALS
RESPIRATION RATE: 14 BRPM | HEIGHT: 73 IN | BODY MASS INDEX: 20.67 KG/M2 | SYSTOLIC BLOOD PRESSURE: 180 MMHG | WEIGHT: 156 LBS | HEART RATE: 49 BPM | DIASTOLIC BLOOD PRESSURE: 90 MMHG

## 2024-04-02 DIAGNOSIS — Z85.46 PERSONAL HISTORY OF MALIGNANT NEOPLASM OF PROSTATE: ICD-10-CM

## 2024-04-02 PROCEDURE — 99204 OFFICE O/P NEW MOD 45 MIN: CPT

## 2024-04-02 NOTE — HISTORY OF PRESENT ILLNESS
[FreeTextEntry1] : Mr. Portillo is a 74 year old male with Metastatic prostate cancer- initially diagnosed with very high risk prostate cancer-status post prostate biopsy which revealed [place] 8 and 9 prostate cancer in all cores. MRI prostate indicated bilateral seminal vesicle involvement. Significant weight loss c/f metastatic disease.  Bone scan was suspicious for metastatic disease. CT chest 4/2022 revealed liver metastases, bone metastases.  He was initiated on lupron on 3/8/22, abiraterone 4/1/22. S/p RT to the prostate bed with Dr. Alvarado. He developed POD, PSA rising and PSMA PET revealed spinal mets. Confirmed on MRI. S/p RT x 10 fractions to the spine.  Initiated enzalutamide (xtandi) 160mg daily on 6/22/23.  Was switched from xtandi to docetaxel 75mg/m2 q 3 weeks and unfortunately experienced a severe drug reaction at the start of C1. Switched to abraxane (D1/D8 q 21 days) and completed 3 cycles of therapy.  He reports that he has had pain in his right leg.  Describes as radiating down his right posterior leg.  Reports it was worse a few weeks ago has been improving.  Denies any focal weakness.  No new bowel bladder dysfunction.  No swelling or edema.  He also reports he had difficulty with his appetite and maintaining his weight since treatment.  Taking tramadol as needed but only occasionally.  States it does help when he takes it

## 2024-04-02 NOTE — ASSESSMENT
[FreeTextEntry1] : 74 year old male presenting for evaluation.  #Right Leg pain: -Suspect radicular in nature -Pending MRI hip -No signs of myelopathy on exam  -Discussed if no improvement, consider MRI lumbar spine -Start gabapentin 300mg BID-Rx sent  #Prostate Cancer/Decreased Appetite: -Discussed risks and benefits of medical cannabis -Medical cannabis certification completed today. Provided cannabis education, overview of state program, and discussed adverse effects in detail. Counseled that vaporized cannabis is not the preferred route of administration due to the fact that both short-term and long-term risks associated with vaporizing oils are not yet fully understood. Recommend starting with 1:1 THC:CBD formulation at low dose of THC (~2mg/dose). -I Stop # [id number]  #Cancer pain: -Tramadol prn -Gabapentin -Medical cannabis  Follow up in 1 month.

## 2024-04-02 NOTE — PHYSICAL EXAM
[FreeTextEntry1] : Gen: Patient is A&O x 3, NAD HEENT: EOMI, hearing grossly normal Resp: regular, non - labored CV: pulses regular Skin: no rashes, erythema Lymph: no clubbing, cyanosis, edema Inspection: no instability  ROM: full throughout Palpation: TTP right lumbar paraspinals, No TTP right femur Sensation: intact to light touch Reflexes: 1+ and symmetric throughout Strength: 5/5 throughout Special tests: +Right straight leg raise  Gait: normal, non-antalgic

## 2024-04-03 PROBLEM — M79.604 PAIN OF RIGHT LOWER EXTREMITY: Status: ACTIVE | Noted: 2024-02-29

## 2024-04-03 PROBLEM — R64 CACHEXIA: Status: ACTIVE | Noted: 2022-01-31

## 2024-04-03 PROBLEM — Z85.89 HISTORY OF KNOWN METASTASIS TO LIVER: Status: ACTIVE | Noted: 2024-04-03

## 2024-04-03 NOTE — REASON FOR VISIT
[Consultation] : a consultation visit [Family Member] : family member [FreeTextEntry1] : Pluvicto evaluation

## 2024-04-03 NOTE — DATA REVIEWED
[FreeTextEntry1] : PSMA PET-CT scans were personally reviewed by me from June 13 2023, December 5th 2023 and March 27th 2024: showing persistent disease in the L3 and L5 lesions as well as heterogeneous liver uptake with several liver lesions showing uptake more than background most conspicuous in segments VII and VIII Mild uptake in left proximal femur has changed and is more diffuse and confluent suggestive of disease involvement as well.  CT scan chest from 4/12/2022 was reviewed.   PSA: 33.6 (March 2024) continuously rising from 6.31 (June 2023)  Overall PSA trend: 25.9 --> 14.6 --> 2.23 --> 0.21 --> 0.09 --> 0.08 -> 0.06 --> 0.50 --> 3.54 -- > 8.15 --> 6.93 --> 9.48 --> 28.80 --> 33  CBC shows mild anemia H/H: 11.4/34  CMP unremarkable besides mild hyperglycemia 124 mg/dl (unknown fasting status)  Testosterone < 2.5 ng/dl  Genetics STATUS: [facility] genetic testing sent 6/22/23 - Negative for pathogenic mutations or [facility] 5/22/23- Foundation testing- PDL1 IC score 0%, TC score 0%, TPS 0%

## 2024-04-03 NOTE — ASSESSMENT
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 Per VISION trial criteria the patient is eligible for Pluvicto treatment  ECOG 1 Blood work is acceptable to proceed with treatment with mild anemia noted  Patient is agreeable to proceed with Pluvicto and will be scheduled accordingly as soon as possible.  The majority of the visit was spent counseling the patient regarding the Pluvicto treatment, procedure, benefit, risks, side effects, toxicity, monitoring, radiation safety precautions.

## 2024-04-04 ENCOUNTER — APPOINTMENT (OUTPATIENT)
Dept: MRI IMAGING | Facility: CLINIC | Age: 74
End: 2024-04-04
Payer: MEDICARE

## 2024-04-04 ENCOUNTER — APPOINTMENT (OUTPATIENT)
Dept: INFUSION THERAPY | Facility: CANCER CENTER | Age: 74
End: 2024-04-04

## 2024-04-04 ENCOUNTER — NON-APPOINTMENT (OUTPATIENT)
Age: 74
End: 2024-04-04

## 2024-04-04 ENCOUNTER — APPOINTMENT (OUTPATIENT)
Dept: HEMATOLOGY ONCOLOGY | Facility: CLINIC | Age: 74
End: 2024-04-04

## 2024-04-04 DIAGNOSIS — M79.604 PAIN IN RIGHT LEG: ICD-10-CM

## 2024-04-04 DIAGNOSIS — Z85.89 PERSONAL HISTORY OF MALIGNANT NEOPLASM OF OTHER ORGANS AND SYSTEMS: ICD-10-CM

## 2024-04-04 DIAGNOSIS — R64 CACHEXIA: ICD-10-CM

## 2024-04-04 PROCEDURE — 73723 MRI JOINT LWR EXTR W/O&W/DYE: CPT | Mod: RT

## 2024-04-04 PROCEDURE — A9585: CPT | Mod: JW

## 2024-04-11 ENCOUNTER — APPOINTMENT (OUTPATIENT)
Dept: INFUSION THERAPY | Facility: CANCER CENTER | Age: 74
End: 2024-04-11

## 2024-04-18 ENCOUNTER — APPOINTMENT (OUTPATIENT)
Dept: HEMATOLOGY ONCOLOGY | Facility: CLINIC | Age: 74
End: 2024-04-18

## 2024-04-19 ENCOUNTER — NON-APPOINTMENT (OUTPATIENT)
Age: 74
End: 2024-04-19

## 2024-04-26 ENCOUNTER — RESULT REVIEW (OUTPATIENT)
Age: 74
End: 2024-04-26

## 2024-05-09 ENCOUNTER — OUTPATIENT (OUTPATIENT)
Dept: OUTPATIENT SERVICES | Facility: HOSPITAL | Age: 74
LOS: 1 days | End: 2024-05-09

## 2024-05-09 DIAGNOSIS — C61 MALIGNANT NEOPLASM OF PROSTATE: ICD-10-CM

## 2024-05-09 DIAGNOSIS — Z51.11 ENCOUNTER FOR ANTINEOPLASTIC CHEMOTHERAPY: ICD-10-CM

## 2024-05-09 DIAGNOSIS — R11.2 NAUSEA WITH VOMITING, UNSPECIFIED: ICD-10-CM

## 2024-05-10 ENCOUNTER — OUTPATIENT (OUTPATIENT)
Dept: OUTPATIENT SERVICES | Facility: HOSPITAL | Age: 74
LOS: 1 days | End: 2024-05-10
Payer: MEDICARE

## 2024-05-10 DIAGNOSIS — R11.2 NAUSEA WITH VOMITING, UNSPECIFIED: ICD-10-CM

## 2024-05-10 DIAGNOSIS — Z51.11 ENCOUNTER FOR ANTINEOPLASTIC CHEMOTHERAPY: ICD-10-CM

## 2024-05-10 DIAGNOSIS — C61 MALIGNANT NEOPLASM OF PROSTATE: ICD-10-CM

## 2024-05-15 ENCOUNTER — APPOINTMENT (OUTPATIENT)
Dept: INFUSION THERAPY | Facility: CANCER CENTER | Age: 74
End: 2024-05-15

## 2024-05-15 DIAGNOSIS — C79.51 SECONDARY MALIGNANT NEOPLASM OF BONE: ICD-10-CM

## 2024-05-16 ENCOUNTER — APPOINTMENT (OUTPATIENT)
Dept: INFUSION THERAPY | Facility: CANCER CENTER | Age: 74
End: 2024-05-16

## 2024-05-16 ENCOUNTER — APPOINTMENT (OUTPATIENT)
Dept: HEMATOLOGY ONCOLOGY | Facility: CLINIC | Age: 74
End: 2024-05-16
Payer: MEDICARE

## 2024-05-16 ENCOUNTER — RESULT REVIEW (OUTPATIENT)
Age: 74
End: 2024-05-16

## 2024-05-16 VITALS
WEIGHT: 158 LBS | RESPIRATION RATE: 16 BRPM | SYSTOLIC BLOOD PRESSURE: 160 MMHG | HEART RATE: 52 BPM | DIASTOLIC BLOOD PRESSURE: 85 MMHG | BODY MASS INDEX: 20.85 KG/M2 | OXYGEN SATURATION: 100 % | TEMPERATURE: 98.3 F

## 2024-05-16 LAB
BASOPHILS # BLD AUTO: 0.03 K/UL — SIGNIFICANT CHANGE UP (ref 0–0.2)
BASOPHILS NFR BLD AUTO: 0.4 % — SIGNIFICANT CHANGE UP (ref 0–2)
EOSINOPHIL # BLD AUTO: 0.33 K/UL — SIGNIFICANT CHANGE UP (ref 0–0.5)
EOSINOPHIL NFR BLD AUTO: 4.9 % — SIGNIFICANT CHANGE UP (ref 0–6)
HCT VFR BLD CALC: 38.3 % — LOW (ref 39–50)
HGB BLD-MCNC: 12.8 G/DL — LOW (ref 13–17)
IMM GRANULOCYTES NFR BLD AUTO: 0.3 % — SIGNIFICANT CHANGE UP (ref 0–0.9)
LYMPHOCYTES # BLD AUTO: 0.85 K/UL — LOW (ref 1–3.3)
LYMPHOCYTES # BLD AUTO: 12.7 % — LOW (ref 13–44)
MCHC RBC-ENTMCNC: 31.3 PG — SIGNIFICANT CHANGE UP (ref 27–34)
MCHC RBC-ENTMCNC: 33.4 GM/DL — SIGNIFICANT CHANGE UP (ref 32–36)
MCV RBC AUTO: 93.6 FL — SIGNIFICANT CHANGE UP (ref 80–100)
MONOCYTES # BLD AUTO: 0.49 K/UL — SIGNIFICANT CHANGE UP (ref 0–0.9)
MONOCYTES NFR BLD AUTO: 7.3 % — SIGNIFICANT CHANGE UP (ref 2–14)
NEUTROPHILS # BLD AUTO: 4.99 K/UL — SIGNIFICANT CHANGE UP (ref 1.8–7.4)
NEUTROPHILS NFR BLD AUTO: 74.4 % — SIGNIFICANT CHANGE UP (ref 43–77)
NRBC # BLD: 0 /100 WBCS — SIGNIFICANT CHANGE UP (ref 0–0)
PLATELET # BLD AUTO: 192 K/UL — SIGNIFICANT CHANGE UP (ref 150–400)
RBC # BLD: 4.09 M/UL — LOW (ref 4.2–5.8)
RBC # FLD: 15.5 % — HIGH (ref 10.3–14.5)
TESTOST FREE+TOTAL PANEL SERPL-MCNC: <2.5 NG/DL — LOW (ref 300–740)
WBC # BLD: 6.71 K/UL — SIGNIFICANT CHANGE UP (ref 3.8–10.5)
WBC # FLD AUTO: 6.71 K/UL — SIGNIFICANT CHANGE UP (ref 3.8–10.5)

## 2024-05-16 PROCEDURE — 96402 CHEMO HORMON ANTINEOPL SQ/IM: CPT

## 2024-05-16 PROCEDURE — G2211 COMPLEX E/M VISIT ADD ON: CPT

## 2024-05-16 PROCEDURE — 84403 ASSAY OF TOTAL TESTOSTERONE: CPT

## 2024-05-16 PROCEDURE — 99215 OFFICE O/P EST HI 40 MIN: CPT

## 2024-05-16 PROCEDURE — 85027 COMPLETE CBC AUTOMATED: CPT

## 2024-05-16 RX ORDER — ENZALUTAMIDE 40 MG/1
40 CAPSULE ORAL DAILY
Qty: 120 | Refills: 6 | Status: DISCONTINUED | COMMUNITY
Start: 2023-06-22 | End: 2024-05-16

## 2024-05-16 RX ORDER — DEXAMETHASONE 4 MG/1
4 TABLET ORAL
Qty: 12 | Refills: 5 | Status: DISCONTINUED | COMMUNITY
Start: 2023-12-15 | End: 2024-05-16

## 2024-05-16 NOTE — RESULTS/DATA
[FreeTextEntry1] : Mr. Portillo initially presented at age 71 in February 2022 for evaluation of locally advanced prostate cancer.  The patient has a medical history of active smoking.   Bone scan concerning for osseous metastatic disease. CT chest revealed liver mets, bone mets. Small pulmonary nodules.  Initiated eligard + abiraterone/pred from 4/2022-6/2023 with initial good response, with POD w/ new spinal mets in 6/2023.  He is now s/p RT x 10 fractions to the spine. Denies back pain.  Initiated enzalutamide (xtandi) 160mg daily on 6/22/23- ultimately PSA increased and PET/CT revealed POD. PET/CT 12/2023 shows new focus of activity in the prostate gland and possible bone activity.  Initiated docetaxel w/ severe infusion reaction, switched to abraxane, which he is tolerating well.  Switched to abraxane (D1/D8 q 21 days) and completed 3 cycles of therapy.  PSA rising while on abraxane, likely c/f POD.   PSMA PET/CT 3/27/24 revealed numerous hepatic mets and few PSMA+ bone mets (decreased in avidity) IR guided liver biopsy was performed 4/26/24 and was c/w prostate adenocarcinoma.  He is now pending pluvicto with Dr. Michoacano Allen. To start on 5/20/24.  Continue lupron q 3 months. POF q 6 weeks.   Discussed Xgeva for castrate resistant disease- reports terrible dental health. Has many decayed/broken teeth. Unlikely to be a candidate for xgeva. Dental letter provided.  DEXA normal. Recommend calcium and vitamin D.  Continue follow with uro-oncology Dr. Kc. Continue tamsulosin and finasteride.  We will trend PSA (free and total), testosterone levels q 3 months.  All patient questions answered at today's visit. Patient urged to call the office with any questions or concerns.   I personally have spent a total of 45 minutes of time on the date of this encounter reviewing test results, documenting findings, coordinating care and directly consulting with the patient and/or designated family member. Greater than 50% of the face to face encounter time was spent on counseling and/or coordination of care for prostate cancer.

## 2024-05-16 NOTE — HISTORY OF PRESENT ILLNESS
[de-identified] : Referred by: Dr. Adrian Kc  Cancer Summary:  DIAGNOSIS: metastatic prostate cancer  PROCEDURE AND DATE: Prostate biopsy 2/1/2022 PATHOLOGY: prostate adenocarcinoma, [place] 8 (4+4) and 9 (4+5) in all cores, +PNI STAGE: IV Chemotherapy: TBD  Radiation: s/p palliative RT to prostate bed for symptom management  pending RT to progressive spinal mets  Hormonal: lupron initiated on 3/8/22.  Abiraterone 1000mg daily + prednisone 5mg 4/1/22 - 6/2023, now with POD Switch to xtandi 160mg daily 6/22/2023  C1D1 docetaxel 1/4/24 - experienced severe drug reaction C1D1 abraxane 1/11/24 C1D8 abraxane 1/18/24 C2D1 abraxane 2/1/24 C2D8 abraxane 2/8/24 C3D1 abraxane 2/22/24  C3D8 abraxane 2/29/24 C4D1 abraxane 3/14/24 STATUS: active  Genetics STATUS: [facility] genetic testing sent 6/22/23 - Negative for pathogenic mutations or VUS  Foundation 5/22/23- Foundation testing- PDL1 IC score 0%, TC score 0%, TPS 0%  Mr. Portillo presented at age 71 in February 2022 for evaluation of prostate cancer.  The patient is an active smoker.   Presenting HPI: Sriram was sent in for evaluation for locally advanced prostate cancer after recently being seen by uro-oncologist Dr. Kc.  He initially presented to Dr. Kc with an elevated PSA. MRI pelvis on 12/9/21 revealed 1 PIRADS 5 lesion with bilateral neurovascular bundle involvement and bilateral seminal vesicle invasion, no pelvic adenopathy. He underwent prostate biopsy on 2/14/22 which revealed high risk prostate cancer in 13/13 cores, idania 8  (4+4) and 9 (4+5). He is currently taking tamsulosin and finasteride. He reports extreme urinary urgency and constipation. He is currently taking dulcolax daily but still having difficulty with bowel movements. He reports he has lost 30lb over the past 2 years 2/2 difficulty eating 2/2 constipation. His bone scan is scheduled and he is ordered for CT chest as well. He was referred to medical oncology and radiation oncology, Dr. Alvarado.   Imaging:  MRI pelvis 12/9/21: 1 PIRADS 5 lesion with bilateral neurovascular bundle involvement and bilateral seminal vesicle invasion, no pelvic adenopathy. NM bone scan 2/24/22: focal increased radiotracer activity in L femoral, R 3rd rib and sacrum, c/f osseous metastatic disease.  CT chest 4/12/22:  liver metastases, R 3rd rib mets, additional possible sclerotic mets, few indeterminate sub-4mm lung nodules.  PSMA PET/CT 6/13/23- reveals focus of intensely increased PSMA expression within the LEFT L3 vertebral body (SUV 22.6) and adjacent to the LEFT L4/L5 facet joint.  Foci of bone tracer seen in L femur, R 3rd rib and sacral regions on prior bone scan do not demonstrate current abnormal PSMA activity.  No abnormal activity at prostatectomy site.  Suspected foci of osseous mets at L3 and left L4/L5 facet joint.  MRI lumbosacral spine 6/28/23- subtle MRI findings at L5-S1 corresponding to focal abnormal PET/CT, concerning for bony metastatic disease, L3 findings suspicious for metastatic disease  HCM:  - COVID vaccination: s/p booster January 2022 - Colonoscopy: done 2 years ago, normal   SH:  - Occupation: previously worked in photo finishes  - Living situation: lives in [place], his wife passed away, he is here with his sister and HCP today (Maylin)  - Smoking/etoh/illicits: he actively smokes 10 cigarettes / day x many years, denies etoh, denies illicits  - Exercise: minimally active   FH:  - No family history of cancer  [de-identified] : Sriram presents for follow up on 5/16/24 for metastatic prostate cancer.  Lupron/eligard initiated 3/2022. Abiraterone started 4/1/22. Switched to xtandi 6/22/23.  DEXA 6/6/22- normal bone density  Initiated abraxane on 1/11/24- 3/14/24.   At today's visit Sriram states he has been feeling more tired. He met w/ Dr. Alfredo and started gabapentin 300mg BID. His leg pain has slightly improved.  He underwent PSMA PET/CT 3/27/24 which revealed numerous hepatic mets and few PSMA+ bone mets (decreased in avidity) IR guided liver biopsy was performed 4/26/24 and was c/w prostate adenocarcinoma.  He is now pending pluvicto with Dr. Michoacano Allen. To start on 5/20/24.   PSA 5/9/24 = 30.1

## 2024-05-16 NOTE — PHYSICAL EXAM
[Ambulatory and capable of all self care but unable to carry out any work activities] : Status 2- Ambulatory and capable of all self care but unable to carry out any work activities. Up and about more than 50% of waking hours [Normal] : affect appropriate [de-identified] : thin male, pleasant, NAD [de-identified] : significant tenderness of R hip

## 2024-05-17 LAB
ALBUMIN SERPL ELPH-MCNC: 3.6 G/DL
ALBUMIN SERPL ELPH-MCNC: 3.9 G/DL
ALP BLD-CCNC: 102 U/L
ALP BLD-CCNC: 117 U/L
ALT SERPL-CCNC: 11 U/L
ALT SERPL-CCNC: 19 U/L
ANION GAP SERPL CALC-SCNC: 12 MMOL/L
ANION GAP SERPL CALC-SCNC: 16 MMOL/L
AST SERPL-CCNC: 19 U/L
AST SERPL-CCNC: 24 U/L
BILIRUB SERPL-MCNC: 0.2 MG/DL
BILIRUB SERPL-MCNC: 0.4 MG/DL
BUN SERPL-MCNC: 22 MG/DL
BUN SERPL-MCNC: 27 MG/DL
CALCIUM SERPL-MCNC: 8.6 MG/DL
CALCIUM SERPL-MCNC: 9.1 MG/DL
CHLORIDE SERPL-SCNC: 106 MMOL/L
CHLORIDE SERPL-SCNC: 98 MMOL/L
CO2 SERPL-SCNC: 24 MMOL/L
CO2 SERPL-SCNC: 25 MMOL/L
CREAT SERPL-MCNC: 0.87 MG/DL
CREAT SERPL-MCNC: 1.03 MG/DL
EGFR: 77 ML/MIN/1.73M2
EGFR: 91 ML/MIN/1.73M2
GLUCOSE SERPL-MCNC: 147 MG/DL
GLUCOSE SERPL-MCNC: 196 MG/DL
HCT VFR BLD CALC: 43.8 %
HGB BLD-MCNC: 14.4 G/DL
MCHC RBC-ENTMCNC: 30.8 PG
MCHC RBC-ENTMCNC: 32.9 GM/DL
MCV RBC AUTO: 93.8 FL
PLATELET # BLD AUTO: 197 K/UL
POTASSIUM SERPL-SCNC: 3.7 MMOL/L
POTASSIUM SERPL-SCNC: 3.9 MMOL/L
PROT SERPL-MCNC: 6 G/DL
PROT SERPL-MCNC: 6.7 G/DL
PSA FREE FLD-MCNC: 19 %
PSA FREE FLD-MCNC: 24 %
PSA FREE SERPL-MCNC: 5.81 NG/ML
PSA FREE SERPL-MCNC: 6.72 NG/ML
PSA SERPL-MCNC: 28.5 NG/ML
PSA SERPL-MCNC: 30.1 NG/ML
RBC # BLD: 4.67 M/UL
RBC # FLD: 15.6 %
SODIUM SERPL-SCNC: 138 MMOL/L
SODIUM SERPL-SCNC: 143 MMOL/L
WBC # FLD AUTO: 6.97 K/UL

## 2024-05-20 ENCOUNTER — OUTPATIENT (OUTPATIENT)
Dept: OUTPATIENT SERVICES | Facility: HOSPITAL | Age: 74
LOS: 1 days | End: 2024-05-20
Payer: MEDICARE

## 2024-05-20 ENCOUNTER — RESULT REVIEW (OUTPATIENT)
Age: 74
End: 2024-05-20

## 2024-05-20 ENCOUNTER — APPOINTMENT (OUTPATIENT)
Dept: NUCLEAR MEDICINE | Facility: HOSPITAL | Age: 74
End: 2024-05-20
Payer: MEDICARE

## 2024-05-20 DIAGNOSIS — C61 MALIGNANT NEOPLASM OF PROSTATE: ICD-10-CM

## 2024-05-20 PROCEDURE — 79101 NUCLEAR RX IV ADMIN: CPT | Mod: 26

## 2024-05-20 PROCEDURE — 79101 NUCLEAR RX IV ADMIN: CPT

## 2024-05-20 PROCEDURE — 78832 RP LOCLZJ TUM SPECT W/CT 2: CPT

## 2024-05-20 PROCEDURE — A9607: CPT

## 2024-05-21 ENCOUNTER — APPOINTMENT (OUTPATIENT)
Dept: PHYSICAL MEDICINE AND REHAB | Facility: CLINIC | Age: 74
End: 2024-05-21
Payer: MEDICARE

## 2024-05-21 VITALS
BODY MASS INDEX: 20.94 KG/M2 | HEIGHT: 73 IN | SYSTOLIC BLOOD PRESSURE: 151 MMHG | WEIGHT: 158 LBS | RESPIRATION RATE: 14 BRPM | DIASTOLIC BLOOD PRESSURE: 72 MMHG | HEART RATE: 71 BPM

## 2024-05-21 DIAGNOSIS — M79.2 NEURALGIA AND NEURITIS, UNSPECIFIED: ICD-10-CM

## 2024-05-21 LAB
TESTOST FREE SERPL-MCNC: 0.2 PG/ML
TESTOST SERPL-MCNC: <2.5 NG/DL

## 2024-05-21 PROCEDURE — 99214 OFFICE O/P EST MOD 30 MIN: CPT

## 2024-05-21 RX ORDER — PROCHLORPERAZINE MALEATE 10 MG/1
10 TABLET ORAL EVERY 6 HOURS
Qty: 30 | Refills: 2 | Status: COMPLETED | COMMUNITY
Start: 2023-12-15 | End: 2024-05-21

## 2024-05-21 RX ORDER — TAMSULOSIN HYDROCHLORIDE 0.4 MG/1
0.4 CAPSULE ORAL
Qty: 90 | Refills: 3 | Status: COMPLETED | COMMUNITY
Start: 2020-07-17 | End: 2024-05-21

## 2024-05-21 RX ORDER — LEUPROLIDE ACETATE 1 MG/0.2ML
5 VIAL (ML) SUBCUTANEOUS
Refills: 0 | Status: ACTIVE | COMMUNITY

## 2024-05-21 RX ORDER — NAPROXEN 500 MG/1
500 TABLET ORAL
Refills: 0 | Status: ACTIVE | COMMUNITY

## 2024-05-21 RX ORDER — GABAPENTIN 300 MG/1
300 CAPSULE ORAL
Qty: 60 | Refills: 2 | Status: ACTIVE | COMMUNITY
Start: 2024-04-02 | End: 1900-01-01

## 2024-05-21 RX ORDER — LABETALOL HYDROCHLORIDE 100 MG/1
100 TABLET, FILM COATED ORAL
Refills: 0 | Status: ACTIVE | COMMUNITY

## 2024-05-21 RX ORDER — (SALINE) 19; 7 G/133ML; G/133ML
ENEMA RECTAL
Qty: 1 | Refills: 0 | Status: COMPLETED | COMMUNITY
Start: 2021-12-27 | End: 2024-05-21

## 2024-05-21 RX ORDER — DOCUSATE SODIUM 50 MG AND SENNOSIDES 8.6 MG 8.6; 5 MG/1; MG/1
8.6-5 TABLET, FILM COATED ORAL
Qty: 60 | Refills: 2 | Status: COMPLETED | COMMUNITY
Start: 2022-08-25 | End: 2024-05-21

## 2024-05-21 RX ORDER — TRAMADOL HYDROCHLORIDE 50 MG/1
50 TABLET, COATED ORAL
Qty: 30 | Refills: 0 | Status: COMPLETED | COMMUNITY
Start: 2024-03-21 | End: 2024-05-21

## 2024-05-21 NOTE — DATA REVIEWED
[FreeTextEntry1] : MRI right hip April 2024: No suspicious osseous lesions.  Tear of the anterior acetabular labrum.

## 2024-05-21 NOTE — HISTORY OF PRESENT ILLNESS
[FreeTextEntry1] : Mr. Portillo is a 74 year old male with Metastatic prostate cancer- initially diagnosed with very high risk prostate cancer-status post prostate biopsy which revealed [place] 8 and 9 prostate cancer in all cores. MRI prostate indicated bilateral seminal vesicle involvement. Significant weight loss c/f metastatic disease.  Bone scan was suspicious for metastatic disease. CT chest 4/2022 revealed liver metastases, bone metastases.  He was initiated on lupron on 3/8/22, abiraterone 4/1/22. S/p RT to the prostate bed with Dr. Alvarado. He developed POD, PSA rising and PSMA PET revealed spinal mets. Confirmed on MRI. S/p RT x 10 fractions to the spine.  Initiated enzalutamide (xtandi) 160mg daily on 6/22/23.  Was switched from xtandi to docetaxel 75mg/m2 q 3 weeks and unfortunately experienced a severe drug reaction at the start of C1. Switched to abraxane (D1/D8 q 21 days) and completed 3 cycles of therapy.  Now treatment with pluvicto.     He reports significant improvement in his pain since last visit.  He reports that the pain in his right leg has improved.  He is taking gabapentin twice a day.  No side effects and thinks that this is helping.  Denies any back pain today on exam.  Denies any new bowel bladder dysfunction.  Feels generalized fatigue but no focal weakness.  Reports medical cannabis has improved his appetite and now he is gaining weight.

## 2024-05-21 NOTE — PHYSICAL EXAM
[FreeTextEntry1] : Gen: Patient is A&O x 3, NAD HEENT: EOMI, hearing grossly normal Resp: regular, non - labored CV: pulses regular Skin: no rashes, erythema Lymph: no clubbing, cyanosis, edema Inspection: no instability  ROM: full throughout, no pain with hip ROM Palpation: No TTP lumbar spine  Sensation: intact to light touch Reflexes: 1+ and symmetric throughout Strength: 5/5 throughout Special tests: -Right straight leg raise  Gait: normal, non-antalgic

## 2024-05-23 ENCOUNTER — APPOINTMENT (OUTPATIENT)
Dept: NUCLEAR MEDICINE | Facility: HOSPITAL | Age: 74
End: 2024-05-23

## 2024-05-23 ENCOUNTER — RESULT REVIEW (OUTPATIENT)
Age: 74
End: 2024-05-23

## 2024-05-23 PROCEDURE — 78832 RP LOCLZJ TUM SPECT W/CT 2: CPT | Mod: 26

## 2024-06-24 DIAGNOSIS — C61 MALIGNANT NEOPLASM OF PROSTATE: ICD-10-CM

## 2024-06-25 LAB
ALBUMIN SERPL ELPH-MCNC: 4.1 G/DL
ALP BLD-CCNC: 103 U/L
ALT SERPL-CCNC: 12 U/L
ANION GAP SERPL CALC-SCNC: 12 MMOL/L
AST SERPL-CCNC: 21 U/L
BILIRUB SERPL-MCNC: 0.3 MG/DL
BUN SERPL-MCNC: 32 MG/DL
CALCIUM SERPL-MCNC: 9.7 MG/DL
CHLORIDE SERPL-SCNC: 104 MMOL/L
CO2 SERPL-SCNC: 26 MMOL/L
CREAT SERPL-MCNC: 0.95 MG/DL
EGFR: 85 ML/MIN/1.73M2
GLUCOSE SERPL-MCNC: 84 MG/DL
HCT VFR BLD CALC: 42.2 %
HGB BLD-MCNC: 14.1 G/DL
MCHC RBC-ENTMCNC: 30.1 PG
MCHC RBC-ENTMCNC: 33.4 GM/DL
MCV RBC AUTO: 90 FL
PLATELET # BLD AUTO: 168 K/UL
POTASSIUM SERPL-SCNC: 4.3 MMOL/L
PROT SERPL-MCNC: 6.9 G/DL
PSA FREE FLD-MCNC: 23 %
PSA FREE SERPL-MCNC: 13.4 NG/ML
PSA SERPL-MCNC: 58.1 NG/ML
RBC # BLD: 4.69 M/UL
RBC # FLD: 16.4 %
SODIUM SERPL-SCNC: 141 MMOL/L
WBC # FLD AUTO: 7.35 K/UL

## 2024-06-25 RX ORDER — FOLIC ACID 1 MG/1
1 TABLET ORAL
Qty: 90 | Refills: 3 | Status: ACTIVE | COMMUNITY
Start: 2023-12-11 | End: 1900-01-01

## 2024-07-01 ENCOUNTER — APPOINTMENT (OUTPATIENT)
Dept: NUCLEAR MEDICINE | Facility: HOSPITAL | Age: 74
End: 2024-07-01

## 2024-07-01 ENCOUNTER — OUTPATIENT (OUTPATIENT)
Dept: OUTPATIENT SERVICES | Facility: HOSPITAL | Age: 74
LOS: 1 days | End: 2024-07-01
Payer: MEDICARE

## 2024-07-01 ENCOUNTER — RESULT REVIEW (OUTPATIENT)
Age: 74
End: 2024-07-01

## 2024-07-01 DIAGNOSIS — C61 MALIGNANT NEOPLASM OF PROSTATE: ICD-10-CM

## 2024-07-01 PROCEDURE — 78832 RP LOCLZJ TUM SPECT W/CT 2: CPT

## 2024-07-01 PROCEDURE — 79101 NUCLEAR RX IV ADMIN: CPT

## 2024-07-01 PROCEDURE — 79101 NUCLEAR RX IV ADMIN: CPT | Mod: 26

## 2024-07-03 ENCOUNTER — RESULT REVIEW (OUTPATIENT)
Age: 74
End: 2024-07-03

## 2024-07-03 ENCOUNTER — APPOINTMENT (OUTPATIENT)
Dept: NUCLEAR MEDICINE | Facility: HOSPITAL | Age: 74
End: 2024-07-03

## 2024-07-03 PROCEDURE — 78832 RP LOCLZJ TUM SPECT W/CT 2: CPT | Mod: 26

## 2024-07-08 ENCOUNTER — OUTPATIENT (OUTPATIENT)
Dept: OUTPATIENT SERVICES | Facility: HOSPITAL | Age: 74
LOS: 1 days | End: 2024-07-08
Payer: MEDICARE

## 2024-07-08 DIAGNOSIS — R11.2 NAUSEA WITH VOMITING, UNSPECIFIED: ICD-10-CM

## 2024-07-08 DIAGNOSIS — Z51.11 ENCOUNTER FOR ANTINEOPLASTIC CHEMOTHERAPY: ICD-10-CM

## 2024-07-08 DIAGNOSIS — C61 MALIGNANT NEOPLASM OF PROSTATE: ICD-10-CM

## 2024-07-11 ENCOUNTER — RESULT REVIEW (OUTPATIENT)
Age: 74
End: 2024-07-11

## 2024-07-11 ENCOUNTER — APPOINTMENT (OUTPATIENT)
Dept: HEMATOLOGY ONCOLOGY | Facility: CLINIC | Age: 74
End: 2024-07-11
Payer: MEDICARE

## 2024-07-11 ENCOUNTER — APPOINTMENT (OUTPATIENT)
Dept: INFUSION THERAPY | Facility: CANCER CENTER | Age: 74
End: 2024-07-11

## 2024-07-11 VITALS
SYSTOLIC BLOOD PRESSURE: 103 MMHG | OXYGEN SATURATION: 97 % | TEMPERATURE: 97.8 F | HEART RATE: 73 BPM | DIASTOLIC BLOOD PRESSURE: 61 MMHG

## 2024-07-11 VITALS — DIASTOLIC BLOOD PRESSURE: 62 MMHG | SYSTOLIC BLOOD PRESSURE: 128 MMHG

## 2024-07-11 DIAGNOSIS — C61 MALIGNANT NEOPLASM OF PROSTATE: ICD-10-CM

## 2024-07-11 DIAGNOSIS — M79.2 NEURALGIA AND NEURITIS, UNSPECIFIED: ICD-10-CM

## 2024-07-11 DIAGNOSIS — C79.51 SECONDARY MALIGNANT NEOPLASM OF BONE: ICD-10-CM

## 2024-07-11 LAB
BASOPHILS # BLD AUTO: 0.04 K/UL — SIGNIFICANT CHANGE UP (ref 0–0.2)
BASOPHILS NFR BLD AUTO: 0.6 % — SIGNIFICANT CHANGE UP (ref 0–2)
EOSINOPHIL # BLD AUTO: 0.49 K/UL — SIGNIFICANT CHANGE UP (ref 0–0.5)
EOSINOPHIL NFR BLD AUTO: 7.3 % — HIGH (ref 0–6)
HCT VFR BLD CALC: 40.5 % — SIGNIFICANT CHANGE UP (ref 39–50)
HGB BLD-MCNC: 13.5 G/DL — SIGNIFICANT CHANGE UP (ref 13–17)
IMM GRANULOCYTES NFR BLD AUTO: 0.4 % — SIGNIFICANT CHANGE UP (ref 0–0.9)
LYMPHOCYTES # BLD AUTO: 0.85 K/UL — LOW (ref 1–3.3)
LYMPHOCYTES # BLD AUTO: 12.6 % — LOW (ref 13–44)
MCHC RBC-ENTMCNC: 30 PG — SIGNIFICANT CHANGE UP (ref 27–34)
MCHC RBC-ENTMCNC: 33.3 GM/DL — SIGNIFICANT CHANGE UP (ref 32–36)
MCV RBC AUTO: 90 FL — SIGNIFICANT CHANGE UP (ref 80–100)
MONOCYTES # BLD AUTO: 0.61 K/UL — SIGNIFICANT CHANGE UP (ref 0–0.9)
MONOCYTES NFR BLD AUTO: 9.1 % — SIGNIFICANT CHANGE UP (ref 2–14)
NEUTROPHILS # BLD AUTO: 4.71 K/UL — SIGNIFICANT CHANGE UP (ref 1.8–7.4)
NEUTROPHILS NFR BLD AUTO: 70 % — SIGNIFICANT CHANGE UP (ref 43–77)
NRBC # BLD: 0 /100 WBCS — SIGNIFICANT CHANGE UP (ref 0–0)
PLATELET # BLD AUTO: 175 K/UL — SIGNIFICANT CHANGE UP (ref 150–400)
RBC # BLD: 4.5 M/UL — SIGNIFICANT CHANGE UP (ref 4.2–5.8)
RBC # FLD: 16.1 % — HIGH (ref 10.3–14.5)
WBC # BLD: 6.73 K/UL — SIGNIFICANT CHANGE UP (ref 3.8–10.5)
WBC # FLD AUTO: 6.73 K/UL — SIGNIFICANT CHANGE UP (ref 3.8–10.5)

## 2024-07-11 PROCEDURE — G2211 COMPLEX E/M VISIT ADD ON: CPT

## 2024-07-11 PROCEDURE — 99214 OFFICE O/P EST MOD 30 MIN: CPT

## 2024-07-11 RX ORDER — TAMSULOSIN HYDROCHLORIDE 0.4 MG/1
CAPSULE ORAL
Refills: 0 | Status: ACTIVE | COMMUNITY

## 2024-07-12 LAB
A1C WITH ESTIMATED AVERAGE GLUCOSE RESULT: 5.8 % — HIGH (ref 4–5.6)
ALBUMIN SERPL ELPH-MCNC: 3.8 G/DL — SIGNIFICANT CHANGE UP (ref 3.3–5)
ALP SERPL-CCNC: 92 U/L — SIGNIFICANT CHANGE UP (ref 40–120)
ALT FLD-CCNC: 9 U/L — LOW (ref 10–45)
ANION GAP SERPL CALC-SCNC: 12 MMOL/L — SIGNIFICANT CHANGE UP (ref 5–17)
AST SERPL-CCNC: 25 U/L — SIGNIFICANT CHANGE UP (ref 10–40)
BILIRUB SERPL-MCNC: 0.3 MG/DL — SIGNIFICANT CHANGE UP (ref 0.2–1.2)
BUN SERPL-MCNC: 26 MG/DL — HIGH (ref 7–23)
CALCIUM SERPL-MCNC: 9.2 MG/DL — SIGNIFICANT CHANGE UP (ref 8.4–10.5)
CHLORIDE SERPL-SCNC: 106 MMOL/L — SIGNIFICANT CHANGE UP (ref 96–108)
CO2 SERPL-SCNC: 25 MMOL/L — SIGNIFICANT CHANGE UP (ref 22–31)
CREAT SERPL-MCNC: 0.8 MG/DL — SIGNIFICANT CHANGE UP (ref 0.5–1.3)
EGFR: 93 ML/MIN/1.73M2 — SIGNIFICANT CHANGE UP
ESTIMATED AVERAGE GLUCOSE: 120 MG/DL — HIGH (ref 68–114)
GLUCOSE SERPL-MCNC: 92 MG/DL — SIGNIFICANT CHANGE UP (ref 70–99)
POTASSIUM SERPL-MCNC: 4 MMOL/L — SIGNIFICANT CHANGE UP (ref 3.5–5.3)
POTASSIUM SERPL-SCNC: 4 MMOL/L — SIGNIFICANT CHANGE UP (ref 3.5–5.3)
PROT SERPL-MCNC: 6.5 G/DL — SIGNIFICANT CHANGE UP (ref 6–8.3)
PSA FLD-MCNC: 74.3 NG/ML — HIGH (ref 0–4)
SODIUM SERPL-SCNC: 144 MMOL/L — SIGNIFICANT CHANGE UP (ref 135–145)
TESTOST FREE+TOTAL PANEL SERPL-MCNC: <2.5 NG/DL — LOW (ref 300–740)

## 2024-07-25 DIAGNOSIS — Z85.89 PERSONAL HISTORY OF MALIGNANT NEOPLASM OF OTHER ORGANS AND SYSTEMS: ICD-10-CM

## 2024-07-25 DIAGNOSIS — C61 MALIGNANT NEOPLASM OF PROSTATE: ICD-10-CM

## 2024-07-26 ENCOUNTER — RESULT REVIEW (OUTPATIENT)
Age: 74
End: 2024-07-26

## 2024-07-26 ENCOUNTER — APPOINTMENT (OUTPATIENT)
Dept: NUCLEAR MEDICINE | Facility: CLINIC | Age: 74
End: 2024-07-26
Payer: MEDICARE

## 2024-07-26 PROCEDURE — A9552: CPT

## 2024-07-26 PROCEDURE — 78815 PET IMAGE W/CT SKULL-THIGH: CPT | Mod: PS

## 2024-08-12 ENCOUNTER — APPOINTMENT (OUTPATIENT)
Dept: NUCLEAR MEDICINE | Facility: HOSPITAL | Age: 74
End: 2024-08-12

## 2024-08-15 ENCOUNTER — RESULT REVIEW (OUTPATIENT)
Age: 74
End: 2024-08-15

## 2024-08-15 ENCOUNTER — APPOINTMENT (OUTPATIENT)
Dept: INFUSION THERAPY | Facility: CANCER CENTER | Age: 74
End: 2024-08-15

## 2024-08-15 ENCOUNTER — APPOINTMENT (OUTPATIENT)
Dept: NUCLEAR MEDICINE | Facility: HOSPITAL | Age: 74
End: 2024-08-15

## 2024-08-15 ENCOUNTER — APPOINTMENT (OUTPATIENT)
Dept: HEMATOLOGY ONCOLOGY | Facility: CLINIC | Age: 74
End: 2024-08-15
Payer: MEDICARE

## 2024-08-15 DIAGNOSIS — C79.51 SECONDARY MALIGNANT NEOPLASM OF BONE: ICD-10-CM

## 2024-08-15 DIAGNOSIS — C61 MALIGNANT NEOPLASM OF PROSTATE: ICD-10-CM

## 2024-08-15 LAB
BASOPHILS # BLD AUTO: 0.03 K/UL — SIGNIFICANT CHANGE UP (ref 0–0.2)
BASOPHILS NFR BLD AUTO: 0.5 % — SIGNIFICANT CHANGE UP (ref 0–2)
EOSINOPHIL # BLD AUTO: 0.51 K/UL — HIGH (ref 0–0.5)
EOSINOPHIL NFR BLD AUTO: 8.4 % — HIGH (ref 0–6)
HCT VFR BLD CALC: 31.8 % — LOW (ref 39–50)
HGB BLD-MCNC: 10.7 G/DL — LOW (ref 13–17)
IMM GRANULOCYTES NFR BLD AUTO: 0.3 % — SIGNIFICANT CHANGE UP (ref 0–0.9)
LYMPHOCYTES # BLD AUTO: 0.94 K/UL — LOW (ref 1–3.3)
LYMPHOCYTES # BLD AUTO: 15.5 % — SIGNIFICANT CHANGE UP (ref 13–44)
MCHC RBC-ENTMCNC: 31.4 PG — SIGNIFICANT CHANGE UP (ref 27–34)
MCHC RBC-ENTMCNC: 33.6 GM/DL — SIGNIFICANT CHANGE UP (ref 32–36)
MCV RBC AUTO: 93.3 FL — SIGNIFICANT CHANGE UP (ref 80–100)
MONOCYTES # BLD AUTO: 0.64 K/UL — SIGNIFICANT CHANGE UP (ref 0–0.9)
MONOCYTES NFR BLD AUTO: 10.6 % — SIGNIFICANT CHANGE UP (ref 2–14)
NEUTROPHILS # BLD AUTO: 3.91 K/UL — SIGNIFICANT CHANGE UP (ref 1.8–7.4)
NEUTROPHILS NFR BLD AUTO: 64.7 % — SIGNIFICANT CHANGE UP (ref 43–77)
NRBC # BLD: 0 /100 WBCS — SIGNIFICANT CHANGE UP (ref 0–0)
PLATELET # BLD AUTO: 225 K/UL — SIGNIFICANT CHANGE UP (ref 150–400)
RBC # BLD: 3.41 M/UL — LOW (ref 4.2–5.8)
RBC # FLD: 16.4 % — HIGH (ref 10.3–14.5)
WBC # BLD: 6.05 K/UL — SIGNIFICANT CHANGE UP (ref 3.8–10.5)
WBC # FLD AUTO: 6.05 K/UL — SIGNIFICANT CHANGE UP (ref 3.8–10.5)

## 2024-08-15 PROCEDURE — 99215 OFFICE O/P EST HI 40 MIN: CPT

## 2024-08-15 PROCEDURE — G2211 COMPLEX E/M VISIT ADD ON: CPT

## 2024-08-15 NOTE — PHYSICAL EXAM
[Fully active, able to carry on all pre-disease performance without restriction] : Status 0 - Fully active, able to carry on all pre-disease performance without restriction [Normal] : affect appropriate [de-identified] : thin male, pleasant, NAD [de-identified] : significant tenderness of R hip

## 2024-08-15 NOTE — HISTORY OF PRESENT ILLNESS
[de-identified] : Referred by: Dr. Adrian Kc  Cancer Summary:  DIAGNOSIS: metastatic prostate cancer  PROCEDURE AND DATE: Prostate biopsy 2/1/2022 PATHOLOGY: prostate adenocarcinoma, [place] 8 (4+4) and 9 (4+5) in all cores, +PNI STAGE: IV Chemotherapy: TBD  Radiation: s/p palliative RT to prostate bed for symptom management  pending RT to progressive spinal mets  Hormonal: lupron initiated on 3/8/22.  Abiraterone 1000mg daily + prednisone 5mg 4/1/22 - 6/2023, now with POD Switch to xtandi 160mg daily 6/22/2023  C1D1 docetaxel 1/4/24 - experienced severe drug reaction C1D1 abraxane 1/11/24 C1D8 abraxane 1/18/24 C2D1 abraxane 2/1/24 C2D8 abraxane 2/8/24 C3D1 abraxane 2/22/24  C3D8 abraxane 2/29/24 C4D1 abraxane 3/14/24 Pluvicto 5/20/24, 7/1/24  STATUS: active  Genetics STATUS: [facility] genetic testing sent 6/22/23 - Negative for pathogenic mutations or VUS  Foundation 5/22/23- Foundation testing- PDL1 IC score 0%, TC score 0%, TPS 0%  Mr. Portillo presented at age 71 in February 2022 for evaluation of prostate cancer.  The patient is an active smoker.   Presenting HPI: Sriram was sent in for evaluation for locally advanced prostate cancer after recently being seen by uro-oncologist Dr. Kc.  He initially presented to Dr. Kc with an elevated PSA. MRI pelvis on 12/9/21 revealed 1 PIRADS 5 lesion with bilateral neurovascular bundle involvement and bilateral seminal vesicle invasion, no pelvic adenopathy. He underwent prostate biopsy on 2/14/22 which revealed high risk prostate cancer in 13/13 cores, [place] 8  (4+4) and 9 (4+5). He is currently taking tamsulosin and finasteride. He reports extreme urinary urgency and constipation. He is currently taking dulcolax daily but still having difficulty with bowel movements. He reports he has lost 30lb over the past 2 years 2/2 difficulty eating 2/2 constipation. His bone scan is scheduled and he is ordered for CT chest as well. He was referred to medical oncology and radiation oncology, Dr. Alvarado.   Imaging:  MRI pelvis 12/9/21: 1 PIRADS 5 lesion with bilateral neurovascular bundle involvement and bilateral seminal vesicle invasion, no pelvic adenopathy. NM bone scan 2/24/22: focal increased radiotracer activity in L femoral, R 3rd rib and sacrum, c/f osseous metastatic disease.  CT chest 4/12/22:  liver metastases, R 3rd rib mets, additional possible sclerotic mets, few indeterminate sub-4mm lung nodules.  PSMA PET/CT 6/13/23- reveals focus of intensely increased PSMA expression within the LEFT L3 vertebral body (SUV 22.6) and adjacent to the LEFT L4/L5 facet joint.  Foci of bone tracer seen in L femur, R 3rd rib and sacral regions on prior bone scan do not demonstrate current abnormal PSMA activity.  No abnormal activity at prostatectomy site.  Suspected foci of osseous mets at L3 and left L4/L5 facet joint.  MRI lumbosacral spine 6/28/23- subtle MRI findings at L5-S1 corresponding to focal abnormal PET/CT, concerning for bony metastatic disease, L3 findings suspicious for metastatic disease  HCM:  - COVID vaccination: s/p booster January 2022 - Colonoscopy: done 2 years ago, normal   SH:  - Occupation: previously worked in photo finishes  - Living situation: lives in [place], his wife passed away, he is here with his sister and HCP today (Maylin)  - Smoking/etoh/illicits: he actively smokes 10 cigarettes / day x many years, denies etoh, denies illicits  - Exercise: minimally active   FH:  - No family history of cancer  [de-identified] : Sriram presents for follow up on 8/15/24 for metastatic prostate cancer.  Lupron/eligard initiated 3/2022. Abiraterone started 4/1/22. Switched to xtandi 6/22/23.  DEXA 6/6/22- normal bone density  Initiated abraxane on 1/11/24- 3/14/24.  S/p Pluvicto 5/20/24, 7/1/24.   Sriram underwent imaging with PET/CT 7/26/24 which revealed multiple FDG avid hepatic mets, large FDG avid mets in the left femoral head, neck and intertrochanteric region, increased risk of pathologic fracture, additional bony mets. He was admitted to [facility] and had a laurel placed in the left femur and was discharged to rehab.  He has been doing well in rehab, overall feels better. He reports a good appetite. He is pending RT w/ Dr. Alvarado to left femur.   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 A/w 35% reduction in risk of disease progression or death compared with hormonal therapy. Most frequent AEs included HTN, anemia, diarrhea and fatigue.  Cabozantinib 40mg PO daily (1 hour before or 2 hours after meals) + atezolizumab 1200mg daily IV every 3 weeks.   PSMA PET/CT 3/27/24 which revealed numerous hepatic mets and few PSMA+ bone mets (decreased in avidity) IR guided liver biopsy was performed 4/26/24 and was c/w prostate adenocarcinoma.  PSA 5/9/24 = 30.1

## 2024-08-15 NOTE — HISTORY OF PRESENT ILLNESS
[de-identified] : Referred by: Dr. Adrian Kc  Cancer Summary:  DIAGNOSIS: metastatic prostate cancer  PROCEDURE AND DATE: Prostate biopsy 2/1/2022 PATHOLOGY: prostate adenocarcinoma, [place] 8 (4+4) and 9 (4+5) in all cores, +PNI STAGE: IV Chemotherapy: TBD  Radiation: s/p palliative RT to prostate bed for symptom management  pending RT to progressive spinal mets  Hormonal: lupron initiated on 3/8/22.  Abiraterone 1000mg daily + prednisone 5mg 4/1/22 - 6/2023, now with POD Switch to xtandi 160mg daily 6/22/2023  C1D1 docetaxel 1/4/24 - experienced severe drug reaction C1D1 abraxane 1/11/24 C1D8 abraxane 1/18/24 C2D1 abraxane 2/1/24 C2D8 abraxane 2/8/24 C3D1 abraxane 2/22/24  C3D8 abraxane 2/29/24 C4D1 abraxane 3/14/24 Pluvicto 5/20/24, 7/1/24  STATUS: active  Genetics STATUS: [employer] genetic testing sent 6/22/23 - Negative for pathogenic mutations or VUS  Foundation 5/22/23- Foundation testing- PDL1 IC score 0%, TC score 0%, TPS 0%  Mr. Portillo presented at age 71 in February 2022 for evaluation of prostate cancer.  The patient is an active smoker.   Presenting HPI: rSiram was sent in for evaluation for locally advanced prostate cancer after recently being seen by uro-oncologist Dr. Kc.  He initially presented to Dr. Kc with an elevated PSA. MRI pelvis on 12/9/21 revealed 1 PIRADS 5 lesion with bilateral neurovascular bundle involvement and bilateral seminal vesicle invasion, no pelvic adenopathy. He underwent prostate biopsy on 2/14/22 which revealed high risk prostate cancer in 13/13 cores, idania 8  (4+4) and 9 (4+5). He is currently taking tamsulosin and finasteride. He reports extreme urinary urgency and constipation. He is currently taking dulcolax daily but still having difficulty with bowel movements. He reports he has lost 30lb over the past 2 years 2/2 difficulty eating 2/2 constipation. His bone scan is scheduled and he is ordered for CT chest as well. He was referred to medical oncology and radiation oncology, Dr. Alvarado.   Imaging:  MRI pelvis 12/9/21: 1 PIRADS 5 lesion with bilateral neurovascular bundle involvement and bilateral seminal vesicle invasion, no pelvic adenopathy. NM bone scan 2/24/22: focal increased radiotracer activity in L femoral, R 3rd rib and sacrum, c/f osseous metastatic disease.  CT chest 4/12/22:  liver metastases, R 3rd rib mets, additional possible sclerotic mets, few indeterminate sub-4mm lung nodules.  PSMA PET/CT 6/13/23- reveals focus of intensely increased PSMA expression within the LEFT L3 vertebral body (SUV 22.6) and adjacent to the LEFT L4/L5 facet joint.  Foci of bone tracer seen in L femur, R 3rd rib and sacral regions on prior bone scan do not demonstrate current abnormal PSMA activity.  No abnormal activity at prostatectomy site.  Suspected foci of osseous mets at L3 and left L4/L5 facet joint.  MRI lumbosacral spine 6/28/23- subtle MRI findings at L5-S1 corresponding to focal abnormal PET/CT, concerning for bony metastatic disease, L3 findings suspicious for metastatic disease  HCM:  - COVID vaccination: s/p booster January 2022 - Colonoscopy: done 2 years ago, normal   SH:  - Occupation: previously worked in photo finishes  - Living situation: lives in [place], his wife passed away, he is here with his sister and HCP today (Maylin)  - Smoking/etoh/illicits: he actively smokes 10 cigarettes / day x many years, denies etoh, denies illicits  - Exercise: minimally active   FH:  - No family history of cancer  [de-identified] : Sriram presents for follow up on 8/15/24 for metastatic prostate cancer.  Lupron/eligard initiated 3/2022. Abiraterone started 4/1/22. Switched to xtandi 6/22/23.  DEXA 6/6/22- normal bone density  Initiated abraxane on 1/11/24- 3/14/24.  S/p Pluvicto 5/20/24, 7/1/24.   Sriram underwent imaging with PET/CT 7/26/24 which revealed multiple FDG avid hepatic mets, large FDG avid mets in the left femoral head, neck and intertrochanteric region, increased risk of pathologic fracture, additional bony mets. He was admitted to [facility] [place] and had a laurel placed in the left femur and was discharged to rehab.  He has been doing well in rehab, overall feels better. He reports a good appetite. He is pending RT w/ Dr. Alvarado to left femur.   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 A/w 35% reduction in risk of disease progression or death compared with hormonal therapy. Most frequent AEs included HTN, anemia, diarrhea and fatigue.  Cabozantinib 40mg PO daily (1 hour before or 2 hours after meals) + atezolizumab 1200mg daily IV every 3 weeks.   PSMA PET/CT 3/27/24 which revealed numerous hepatic mets and few PSMA+ bone mets (decreased in avidity) IR guided liver biopsy was performed 4/26/24 and was c/w prostate adenocarcinoma.  PSA 5/9/24 = 30.1

## 2024-08-15 NOTE — RESULTS/DATA
[FreeTextEntry1] : Mr. Portillo initially presented at age 71 in February 2022 for evaluation of locally advanced prostate cancer.  The patient has a medical history of active smoking.   Bone scan concerning for osseous metastatic disease. CT chest revealed liver mets, bone mets. Small pulmonary nodules.  Initiated eligard + abiraterone/pred from 4/2022-6/2023 with initial good response, with POD w/ new spinal mets in 6/2023.  He is now s/p RT x 10 fractions to the spine. Denies back pain.  Initiated enzalutamide (xtandi) 160mg daily on 6/22/23- ultimately PSA increased and PET/CT revealed POD. PET/CT 12/2023 shows new focus of activity in the prostate gland and possible bone activity.  Initiated docetaxel w/ severe infusion reaction, switched to abraxane. Switched to abraxane (D1/D8 q 21 days) and completed 3 cycles of therapy.  PSA rising while on abraxane, likely c/f POD.   PSMA PET/CT 3/27/24 revealed numerous hepatic mets and few PSMA+ bone mets (decreased in avidity) IR guided liver biopsy was performed 4/26/24 and was c/w prostate adenocarcinoma.  S/p Pluvicto 5/20/24, 7/1/24.  PET/CT 7/26/24 revealed multiple FDG avid hepatic mets, large FDG avid mets in the left femoral head, neck and intertrochanteric region, increased risk of pathologic fracture, additional bony mets.  S/p intratrochanteric laurel placed in the left femur.  Pending RT w/ Dr. Alvarado to left femur.   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 A/w 35% reduction in risk of disease progression or death compared with hormonal therapy. Most frequent AEs included HTN, anemia, diarrhea and fatigue.  Cabozantinib 40mg PO daily (1 hour before or 2 hours after meals) + atezolizumab 1200mg daily IV every 3 weeks.  Continue lupron q 3 months. POF q 6 weeks.   Discussed Xgeva for castrate resistant disease- reports terrible dental health. Has many decayed/broken teeth. Unlikely to be a candidate for xgeva. Dental letter provided.  DEXA normal. Recommend calcium and vitamin D.  Continue follow with uro-oncology Dr. Kc. Continue tamsulosin and finasteride.  We will trend PSA (free and total), testosterone levels q 3 months.  All patient questions answered at today's visit. Patient urged to call the office with any questions or concerns.   I personally have spent a total of 45 minutes of time on the date of this encounter reviewing test results, documenting findings, coordinating care and directly consulting with the patient and/or designated family member. Greater than 50% of the face to face encounter time was spent on counseling and/or coordination of care for prostate cancer.

## 2024-08-15 NOTE — PHYSICAL EXAM
[Fully active, able to carry on all pre-disease performance without restriction] : Status 0 - Fully active, able to carry on all pre-disease performance without restriction [Normal] : affect appropriate [de-identified] : thin male, pleasant, NAD [de-identified] : significant tenderness of R hip

## 2024-08-16 LAB
ALBUMIN SERPL ELPH-MCNC: 3.9 G/DL — SIGNIFICANT CHANGE UP (ref 3.3–5)
ALP SERPL-CCNC: 112 U/L — SIGNIFICANT CHANGE UP (ref 40–120)
ALT FLD-CCNC: 12 U/L — SIGNIFICANT CHANGE UP (ref 10–45)
ANION GAP SERPL CALC-SCNC: 11 MMOL/L — SIGNIFICANT CHANGE UP (ref 5–17)
AST SERPL-CCNC: 31 U/L — SIGNIFICANT CHANGE UP (ref 10–40)
BILIRUB SERPL-MCNC: 0.4 MG/DL — SIGNIFICANT CHANGE UP (ref 0.2–1.2)
BUN SERPL-MCNC: 22 MG/DL — SIGNIFICANT CHANGE UP (ref 7–23)
CALCIUM SERPL-MCNC: 9.2 MG/DL — SIGNIFICANT CHANGE UP (ref 8.4–10.5)
CHLORIDE SERPL-SCNC: 102 MMOL/L — SIGNIFICANT CHANGE UP (ref 96–108)
CO2 SERPL-SCNC: 24 MMOL/L — SIGNIFICANT CHANGE UP (ref 22–31)
CREAT SERPL-MCNC: 0.71 MG/DL — SIGNIFICANT CHANGE UP (ref 0.5–1.3)
EGFR: 97 ML/MIN/1.73M2 — SIGNIFICANT CHANGE UP
GLUCOSE SERPL-MCNC: 100 MG/DL — HIGH (ref 70–99)
POTASSIUM SERPL-MCNC: 4.2 MMOL/L — SIGNIFICANT CHANGE UP (ref 3.5–5.3)
POTASSIUM SERPL-SCNC: 4.2 MMOL/L — SIGNIFICANT CHANGE UP (ref 3.5–5.3)
PROT SERPL-MCNC: 6.6 G/DL — SIGNIFICANT CHANGE UP (ref 6–8.3)
PSA FLD-MCNC: 90.1 NG/ML — HIGH (ref 0–4)
SODIUM SERPL-SCNC: 138 MMOL/L — SIGNIFICANT CHANGE UP (ref 135–145)
TESTOST FREE+TOTAL PANEL SERPL-MCNC: 3 NG/DL — LOW (ref 300–740)

## 2024-08-20 ENCOUNTER — APPOINTMENT (OUTPATIENT)
Dept: PHYSICAL MEDICINE AND REHAB | Facility: CLINIC | Age: 74
End: 2024-08-20

## 2024-08-22 ENCOUNTER — RESULT REVIEW (OUTPATIENT)
Age: 74
End: 2024-08-22

## 2024-08-22 ENCOUNTER — APPOINTMENT (OUTPATIENT)
Dept: HEMATOLOGY ONCOLOGY | Facility: CLINIC | Age: 74
End: 2024-08-22

## 2024-08-22 LAB
BASOPHILS # BLD AUTO: 0.03 K/UL — SIGNIFICANT CHANGE UP (ref 0–0.2)
BASOPHILS NFR BLD AUTO: 0.4 % — SIGNIFICANT CHANGE UP (ref 0–2)
EOSINOPHIL # BLD AUTO: 0.56 K/UL — HIGH (ref 0–0.5)
EOSINOPHIL NFR BLD AUTO: 7.9 % — HIGH (ref 0–6)
HCT VFR BLD CALC: 34.2 % — LOW (ref 39–50)
HGB BLD-MCNC: 11.3 G/DL — LOW (ref 13–17)
IMM GRANULOCYTES NFR BLD AUTO: 0.6 % — SIGNIFICANT CHANGE UP (ref 0–0.9)
LYMPHOCYTES # BLD AUTO: 0.96 K/UL — LOW (ref 1–3.3)
LYMPHOCYTES # BLD AUTO: 13.5 % — SIGNIFICANT CHANGE UP (ref 13–44)
MCHC RBC-ENTMCNC: 31.4 PG — SIGNIFICANT CHANGE UP (ref 27–34)
MCHC RBC-ENTMCNC: 33 GM/DL — SIGNIFICANT CHANGE UP (ref 32–36)
MCV RBC AUTO: 95 FL — SIGNIFICANT CHANGE UP (ref 80–100)
MONOCYTES # BLD AUTO: 0.69 K/UL — SIGNIFICANT CHANGE UP (ref 0–0.9)
MONOCYTES NFR BLD AUTO: 9.7 % — SIGNIFICANT CHANGE UP (ref 2–14)
NEUTROPHILS # BLD AUTO: 4.81 K/UL — SIGNIFICANT CHANGE UP (ref 1.8–7.4)
NEUTROPHILS NFR BLD AUTO: 67.9 % — SIGNIFICANT CHANGE UP (ref 43–77)
NRBC # BLD: 0 /100 WBCS — SIGNIFICANT CHANGE UP (ref 0–0)
PLATELET # BLD AUTO: 224 K/UL — SIGNIFICANT CHANGE UP (ref 150–400)
RBC # BLD: 3.6 M/UL — LOW (ref 4.2–5.8)
RBC # FLD: 16.2 % — HIGH (ref 10.3–14.5)
WBC # BLD: 7.09 K/UL — SIGNIFICANT CHANGE UP (ref 3.8–10.5)
WBC # FLD AUTO: 7.09 K/UL — SIGNIFICANT CHANGE UP (ref 3.8–10.5)

## 2024-08-22 PROCEDURE — 96402 CHEMO HORMON ANTINEOPL SQ/IM: CPT

## 2024-08-22 PROCEDURE — 85027 COMPLETE CBC AUTOMATED: CPT

## 2024-08-22 PROCEDURE — 83036 HEMOGLOBIN GLYCOSYLATED A1C: CPT

## 2024-08-22 PROCEDURE — 96523 IRRIG DRUG DELIVERY DEVICE: CPT

## 2024-08-22 PROCEDURE — 84403 ASSAY OF TOTAL TESTOSTERONE: CPT

## 2024-08-22 PROCEDURE — 80053 COMPREHEN METABOLIC PANEL: CPT

## 2024-08-22 PROCEDURE — G0103: CPT

## 2024-08-30 ENCOUNTER — APPOINTMENT (OUTPATIENT)
Dept: RADIOLOGY | Facility: CLINIC | Age: 74
End: 2024-08-30

## 2024-09-12 RX ORDER — CABOZANTINIB 40 MG/1
40 TABLET ORAL DAILY
Qty: 30 | Refills: 3 | Status: ACTIVE | COMMUNITY
Start: 2024-09-12 | End: 1900-01-01

## 2024-09-17 ENCOUNTER — APPOINTMENT (OUTPATIENT)
Dept: PHYSICAL MEDICINE AND REHAB | Facility: CLINIC | Age: 74
End: 2024-09-17
Payer: MEDICARE

## 2024-09-17 VITALS
DIASTOLIC BLOOD PRESSURE: 76 MMHG | RESPIRATION RATE: 16 BRPM | HEART RATE: 44 BPM | HEIGHT: 73 IN | BODY MASS INDEX: 20.44 KG/M2 | SYSTOLIC BLOOD PRESSURE: 146 MMHG | WEIGHT: 154.25 LBS

## 2024-09-17 DIAGNOSIS — M79.2 NEURALGIA AND NEURITIS, UNSPECIFIED: ICD-10-CM

## 2024-09-17 DIAGNOSIS — R64 CACHEXIA: ICD-10-CM

## 2024-09-17 DIAGNOSIS — C79.51 SECONDARY MALIGNANT NEOPLASM OF BONE: ICD-10-CM

## 2024-09-17 PROCEDURE — 99214 OFFICE O/P EST MOD 30 MIN: CPT

## 2024-09-17 NOTE — HISTORY OF PRESENT ILLNESS
[FreeTextEntry1] : Mr. Portillo is a 74 year old male with Metastatic prostate cancer- initially diagnosed with very high risk prostate cancer-status post prostate biopsy which revealed [place] 8 and 9 prostate cancer in all cores. MRI prostate indicated bilateral seminal vesicle involvement. Significant weight loss c/f metastatic disease.  Bone scan was suspicious for metastatic disease. CT chest 4/2022 revealed liver metastases, bone metastases.  He was initiated on lupron on 3/8/22, abiraterone 4/1/22. S/p RT to the prostate bed with Dr. Alvarado. He developed POD, PSA rising and PSMA PET revealed spinal mets. Confirmed on MRI. S/p RT x 10 fractions to the spine.  Initiated enzalutamide (xtandi) 160mg daily on 6/22/23.  Was switched from xtandi to docetaxel 75mg/m2 q 3 weeks and unfortunately experienced a severe drug reaction at the start of C1. Switched to abraxane (D1/D8 q 21 days) and completed 3 cycles of therapy.  S/P pluvicto.  S/p intratrochanteric laurel placed in the left femur.  S/P RT to left femur.  He reports his mobility is improving.  He is currently in home PT OT.  Still has some radiating pain down his right leg and pain in his left leg but doing better.  Continues on gabapentin no side effects.  Reports that medical cannabis is helping him gain weight and helping him sleep at night.  Otherwise denies any significant pain.  Denies any pain in his spine.  No new bowel bladder dysfunction.

## 2024-09-17 NOTE — ASSESSMENT
[FreeTextEntry1] : 74 year old male presenting for evaluation.  #Right Leg pain: -Suspect radicular in nature/neuropathic -Overall improving  -No signs of myelopathy on exam  -Continue gabapentin 300mg BID-Rx sent -Discussed MRI lumbar, given improvement in symptoms, he would like to defer  -Monitor   #Left left femur laurel placement: -Continue home PT/OT -Precautions: Monitor for functional pain, avoid high resistance   #Prostate Cancer/Decreased Appetite: -Continue medical cannabis, reports helping, no side effects  -CMP reviewed, AST 31,    Follow up in 6 weeks.

## 2024-09-17 NOTE — PHYSICAL EXAM
[FreeTextEntry1] : Gen: Patient is A&O x 3, NAD HEENT: EOMI, hearing grossly normal Resp: regular, non - labored CV: pulses regular Skin: no rashes, erythema Lymph: no clubbing, cyanosis, edema Inspection: no instability  ROM: full throughout, no pain with hip ROM Palpation: No TTP lumbar spine  Sensation: intact to light touch Reflexes: 1+ and symmetric throughout Strength: 5/5 throughout Special tests: -Right straight leg raise  Gait: RW, small step length

## 2024-09-23 ENCOUNTER — APPOINTMENT (OUTPATIENT)
Dept: NUCLEAR MEDICINE | Facility: HOSPITAL | Age: 74
End: 2024-09-23

## 2024-09-24 ENCOUNTER — OUTPATIENT (OUTPATIENT)
Dept: OUTPATIENT SERVICES | Facility: HOSPITAL | Age: 74
LOS: 1 days | End: 2024-09-24
Payer: MEDICARE

## 2024-09-24 DIAGNOSIS — Z51.11 ENCOUNTER FOR ANTINEOPLASTIC CHEMOTHERAPY: ICD-10-CM

## 2024-09-24 DIAGNOSIS — C61 MALIGNANT NEOPLASM OF PROSTATE: ICD-10-CM

## 2024-09-24 DIAGNOSIS — R11.2 NAUSEA WITH VOMITING, UNSPECIFIED: ICD-10-CM

## 2024-09-26 ENCOUNTER — NON-APPOINTMENT (OUTPATIENT)
Age: 74
End: 2024-09-26

## 2024-09-26 ENCOUNTER — APPOINTMENT (OUTPATIENT)
Dept: NUCLEAR MEDICINE | Facility: HOSPITAL | Age: 74
End: 2024-09-26

## 2024-09-30 ENCOUNTER — RESULT REVIEW (OUTPATIENT)
Age: 74
End: 2024-09-30

## 2024-09-30 ENCOUNTER — LABORATORY RESULT (OUTPATIENT)
Age: 74
End: 2024-09-30

## 2024-09-30 ENCOUNTER — NON-APPOINTMENT (OUTPATIENT)
Age: 74
End: 2024-09-30

## 2024-09-30 ENCOUNTER — APPOINTMENT (OUTPATIENT)
Dept: INFUSION THERAPY | Facility: CANCER CENTER | Age: 74
End: 2024-09-30

## 2024-09-30 ENCOUNTER — APPOINTMENT (OUTPATIENT)
Dept: HEMATOLOGY ONCOLOGY | Facility: CLINIC | Age: 74
End: 2024-09-30
Payer: MEDICARE

## 2024-09-30 DIAGNOSIS — C79.51 SECONDARY MALIGNANT NEOPLASM OF BONE: ICD-10-CM

## 2024-09-30 DIAGNOSIS — Z01.818 ENCOUNTER FOR OTHER PREPROCEDURAL EXAMINATION: ICD-10-CM

## 2024-09-30 DIAGNOSIS — C61 MALIGNANT NEOPLASM OF PROSTATE: ICD-10-CM

## 2024-09-30 LAB
BASOPHILS # BLD AUTO: 0.04 K/UL — SIGNIFICANT CHANGE UP (ref 0–0.2)
BASOPHILS NFR BLD AUTO: 0.6 % — SIGNIFICANT CHANGE UP (ref 0–2)
EOSINOPHIL # BLD AUTO: 0.88 K/UL — HIGH (ref 0–0.5)
EOSINOPHIL NFR BLD AUTO: 12.5 % — HIGH (ref 0–6)
HCT VFR BLD CALC: 36 % — LOW (ref 39–50)
HGB BLD-MCNC: 12.1 G/DL — LOW (ref 13–17)
IMM GRANULOCYTES NFR BLD AUTO: 0.3 % — SIGNIFICANT CHANGE UP (ref 0–0.9)
LYMPHOCYTES # BLD AUTO: 0.88 K/UL — LOW (ref 1–3.3)
LYMPHOCYTES # BLD AUTO: 12.5 % — LOW (ref 13–44)
MCHC RBC-ENTMCNC: 32.4 PG — SIGNIFICANT CHANGE UP (ref 27–34)
MCHC RBC-ENTMCNC: 33.6 GM/DL — SIGNIFICANT CHANGE UP (ref 32–36)
MCV RBC AUTO: 96.3 FL — SIGNIFICANT CHANGE UP (ref 80–100)
MONOCYTES # BLD AUTO: 0.67 K/UL — SIGNIFICANT CHANGE UP (ref 0–0.9)
MONOCYTES NFR BLD AUTO: 9.5 % — SIGNIFICANT CHANGE UP (ref 2–14)
NEUTROPHILS # BLD AUTO: 4.56 K/UL — SIGNIFICANT CHANGE UP (ref 1.8–7.4)
NEUTROPHILS NFR BLD AUTO: 64.6 % — SIGNIFICANT CHANGE UP (ref 43–77)
NRBC # BLD: 0 /100 WBCS — SIGNIFICANT CHANGE UP (ref 0–0)
NRBC BLD-RTO: 0 /100 WBCS — SIGNIFICANT CHANGE UP (ref 0–0)
PLATELET # BLD AUTO: 197 K/UL — SIGNIFICANT CHANGE UP (ref 150–400)
RBC # BLD: 3.74 M/UL — LOW (ref 4.2–5.8)
RBC # FLD: 15.4 % — HIGH (ref 10.3–14.5)
WBC # BLD: 7.05 K/UL — SIGNIFICANT CHANGE UP (ref 3.8–10.5)
WBC # FLD AUTO: 7.05 K/UL — SIGNIFICANT CHANGE UP (ref 3.8–10.5)

## 2024-09-30 PROCEDURE — G2211 COMPLEX E/M VISIT ADD ON: CPT

## 2024-09-30 PROCEDURE — 99215 OFFICE O/P EST HI 40 MIN: CPT

## 2024-09-30 NOTE — PHYSICAL EXAM
[Fully active, able to carry on all pre-disease performance without restriction] : Status 0 - Fully active, able to carry on all pre-disease performance without restriction [Normal] : affect appropriate [de-identified] : thin male, pleasant, NAD [de-identified] : Right chest wall port without erythema or fluctuance  [de-identified] : significant tenderness of R hip

## 2024-09-30 NOTE — PHYSICAL EXAM
[Fully active, able to carry on all pre-disease performance without restriction] : Status 0 - Fully active, able to carry on all pre-disease performance without restriction [Normal] : affect appropriate [de-identified] : thin male, pleasant, NAD [de-identified] : Right chest wall port without erythema or fluctuance  [de-identified] : significant tenderness of R hip

## 2024-09-30 NOTE — HISTORY OF PRESENT ILLNESS
[de-identified] : Referred by: Dr. Adrian Kc  Cancer Summary:  DIAGNOSIS: metastatic prostate cancer  PROCEDURE AND DATE: Prostate biopsy 2/1/2022 PATHOLOGY: prostate adenocarcinoma, Idania 8 (4+4) and 9 (4+5) in all cores, +PNI STAGE: IV Chemotherapy: TBD  Radiation: s/p palliative RT to prostate bed for symptom management  pending RT to progressive spinal mets  Hormonal: lupron initiated on 3/8/22.  Abiraterone 1000mg daily + prednisone 5mg 4/1/22 - 6/2023, now with POD Switch to xtandi 160mg daily 6/22/2023  C1D1 docetaxel 1/4/24 - experienced severe drug reaction C1D1 abraxane 1/11/24 C1D8 abraxane 1/18/24 C2D1 abraxane 2/1/24 C2D8 abraxane 2/8/24 C3D1 abraxane 2/22/24  C3D8 abraxane 2/29/24 C4D1 abraxane 3/14/24 Pluvicto 5/20/24, 7/1/24  C1 cabozantinib (po daily) + atezolizumab (IV q3 weeks) - 9/30/24  STATUS: active  Genetics STATUS: [employer] genetic testing sent 6/22/23 - Negative for pathogenic mutations or VUS  Foundation 5/22/23- Foundation testing- PDL1 IC score 0%, TC score 0%, TPS 0%  Mr. Portillo presented at age 71 in February 2022 for evaluation of prostate cancer.  The patient is an active smoker.   Presenting HPI: Sriram was sent in for evaluation for locally advanced prostate cancer after recently being seen by uro-oncologist Dr. Kc.  He initially presented to Dr. Kc with an elevated PSA. MRI pelvis on 12/9/21 revealed 1 PIRADS 5 lesion with bilateral neurovascular bundle involvement and bilateral seminal vesicle invasion, no pelvic adenopathy. He underwent prostate biopsy on 2/14/22 which revealed high risk prostate cancer in 13/13 cores, idania 8  (4+4) and 9 (4+5). He is currently taking tamsulosin and finasteride. He reports extreme urinary urgency and constipation. He is currently taking dulcolax daily but still having difficulty with bowel movements. He reports he has lost 30lb over the past 2 years 2/2 difficulty eating 2/2 constipation. His bone scan is scheduled and he is ordered for CT chest as well. He was referred to medical oncology and radiation oncology, Dr. Alvarado.   Imaging:  MRI pelvis 12/9/21: 1 PIRADS 5 lesion with bilateral neurovascular bundle involvement and bilateral seminal vesicle invasion, no pelvic adenopathy. NM bone scan 2/24/22: focal increased radiotracer activity in L femoral, R 3rd rib and sacrum, c/f osseous metastatic disease.  CT chest 4/12/22:  liver metastases, R 3rd rib mets, additional possible sclerotic mets, few indeterminate sub-4mm lung nodules.  PSMA PET/CT 6/13/23- reveals focus of intensely increased PSMA expression within the LEFT L3 vertebral body (SUV 22.6) and adjacent to the LEFT L4/L5 facet joint.  Foci of bone tracer seen in L femur, R 3rd rib and sacral regions on prior bone scan do not demonstrate current abnormal PSMA activity.  No abnormal activity at prostatectomy site.  Suspected foci of osseous mets at L3 and left L4/L5 facet joint.  MRI lumbosacral spine 6/28/23- subtle MRI findings at L5-S1 corresponding to focal abnormal PET/CT, concerning for bony metastatic disease, L3 findings suspicious for metastatic disease  HCM:  - COVID vaccination: s/p booster January 2022 - Colonoscopy: done 2 years ago, normal   SH:  - Occupation: previously worked in photo finishes  - Living situation: lives in [place], his wife passed away, he is here with his sister and HCP today (Maylin)  - Smoking/etoh/illicits: he actively smokes 10 cigarettes / day x many years, denies etoh, denies illicits  - Exercise: minimally active   FH:  - No family history of cancer  [de-identified] : Sriram presents for follow up on 9/30/24 for metastatic prostate cancer.  Accompanied by sister. Lupron/eligard initiated 3/2022. Abiraterone started 4/1/22. Switched to xtandi 6/22/23.  DEXA 6/6/22- normal bone density  Initiated abraxane on 1/11/24- 3/14/24.  S/p Pluvicto 5/20/24, 7/1/24 PET/CT 7/26/24 - multiple FDG avid hepatic mets, large FDG avid mets in the left femoral head, neck and intertrochanteric region, increased risk of pathologic fracture, additional bony mets. [place] [facility] [place] laurel [place] in the left femur and was discharged to rehab (now home). Completed RT to L femur w/ Dr. 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 A/w 35% reduction in risk of disease progression or death compared with hormonal therapy. Most frequent AEs included HTN, anemia, diarrhea and fatigue.  Cabozantinib 40mg PO daily (1 hour before or 2 hours after meals) + atezolizumab 1200mg daily IV every 3 weeks.   At today's visit pt is happy to be starting new treatment plan.  He continues home PT/OT s/p left femur surgery - still with mild tenderness.  Overall feels better. He reports a good appetite, has gained a couple of lbs.  Using MMJ as per Dr. Alfredo with improvement in appetite, anxiety and sleep. BP remains elevated; reminded to schedule f/u visit with PCP or cardiology.  Labs today reviewed, discussed and c/f:  WBC 7.05, hgb 12.1, plt 197 PSA 90 on 8/15/24

## 2024-09-30 NOTE — RESULTS/DATA
[FreeTextEntry1] : Mr. Portillo initially presented at age 71 in February 2022 for evaluation of locally advanced prostate cancer.  The patient has a medical history of active smoking.   Bone scan concerning for osseous metastatic disease. CT chest revealed liver mets, bone mets. Small pulmonary nodules.  Initiated eligard + abiraterone/pred from 4/2022-6/2023 with initial good response, with POD w/ new spinal mets in 6/2023.  He is now s/p RT x 10 fractions to the spine. Denies back pain.  Initiated enzalutamide (xtandi) 160mg daily on 6/22/23- ultimately PSA increased and PET/CT revealed POD. PET/CT 12/2023 shows new focus of activity in the prostate gland and possible bone activity.  Initiated docetaxel w/ severe infusion reaction, switched to abraxane. Switched to abraxane (D1/D8 q 21 days) and completed 3 cycles of therapy.  PSA rising while on abraxane, likely c/f POD.   PSMA PET/CT 3/27/24 revealed numerous hepatic mets and few PSMA+ bone mets (decreased in avidity) IR guided liver biopsy was performed 4/26/24 and was c/w prostate adenocarcinoma.  S/p Pluvicto 5/20/24, 7/1/24.  PET/CT 7/26/24 revealed multiple FDG avid hepatic mets, large FDG avid mets in the left femoral head, neck and intertrochanteric region, increased risk of pathologic fracture, additional bony mets.  S/p intratrochanteric laurel placed in the left femur.  Completed RT w/ Dr. Alvarado to left femur.   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 A/w 35% reduction in risk of disease progression or death compared with hormonal therapy. Most frequent AEs included HTN, anemia, diarrhea and fatigue.  Cabozantinib 40mg PO daily (1 hour before or 2 hours after meals) + atezolizumab 1200mg daily IV every 3 weeks - receiving C1D1 today 9/30/24 (started cabo po this am) Continue infusions q 3 weeks; next office visit with infusion on 10/21/24 w/ Dr. Edwards if possible Continue lupron q 3 months - next due 11/7/24  Discussed Xgeva for castrate resistant disease- reports terrible dental health. Has many decayed/broken teeth. Unlikely to be a candidate for xgeva. Dental letter provided.  DEXA normal. Recommend calcium and vitamin D.  Continue follow with uro-oncology Dr. Kc. Continue tamsulosin and finasteride.  We will trend PSA (free and total), testosterone levels q 3 months.  All patient questions answered at today's visit. Patient urged to call the office with any questions or concerns.   I personally have spent a total of 40 minutes of time on the date of this encounter reviewing test results, documenting findings, coordinating care and directly consulting with the patient and/or designated family member. Greater than 50% of the face to face encounter time was spent on counseling and/or coordination of care for prostate cancer.

## 2024-09-30 NOTE — HISTORY OF PRESENT ILLNESS
[de-identified] : Referred by: Dr. Adrian Kc  Cancer Summary:  DIAGNOSIS: metastatic prostate cancer  PROCEDURE AND DATE: Prostate biopsy 2/1/2022 PATHOLOGY: prostate adenocarcinoma, [place] 8 (4+4) and 9 (4+5) in all cores, +PNI STAGE: IV Chemotherapy: TBD  Radiation: s/p palliative RT to prostate bed for symptom management  pending RT to progressive spinal mets  Hormonal: lupron initiated on 3/8/22.  Abiraterone 1000mg daily + prednisone 5mg 4/1/22 - 6/2023, now with POD Switch to xtandi 160mg daily 6/22/2023  C1D1 docetaxel 1/4/24 - experienced severe drug reaction C1D1 abraxane 1/11/24 C1D8 abraxane 1/18/24 C2D1 abraxane 2/1/24 C2D8 abraxane 2/8/24 C3D1 abraxane 2/22/24  C3D8 abraxane 2/29/24 C4D1 abraxane 3/14/24 Pluvicto 5/20/24, 7/1/24  C1 cabozantinib (po daily) + atezolizumab (IV q3 weeks) - 9/30/24  STATUS: active  Genetics STATUS: [employer] genetic testing sent 6/22/23 - Negative for pathogenic mutations or VUS  Foundation 5/22/23- Foundation testing- PDL1 IC score 0%, TC score 0%, TPS 0%  Mr. Portillo presented at age 71 in February 2022 for evaluation of prostate cancer.  The patient is an active smoker.   Presenting HPI: Sriram was sent in for evaluation for locally advanced prostate cancer after recently being seen by uro-oncologist Dr. Kc.  He initially presented to Dr. Kc with an elevated PSA. MRI pelvis on 12/9/21 revealed 1 PIRADS 5 lesion with bilateral neurovascular bundle involvement and bilateral seminal vesicle invasion, no pelvic adenopathy. He underwent prostate biopsy on 2/14/22 which revealed high risk prostate cancer in 13/13 cores, idania 8  (4+4) and 9 (4+5). He is currently taking tamsulosin and finasteride. He reports extreme urinary urgency and constipation. He is currently taking dulcolax daily but still having difficulty with bowel movements. He reports he has lost 30lb over the past 2 years 2/2 difficulty eating 2/2 constipation. His bone scan is scheduled and he is ordered for CT chest as well. He was referred to medical oncology and radiation oncology, Dr. Alvarado.   Imaging:  MRI pelvis 12/9/21: 1 PIRADS 5 lesion with bilateral neurovascular bundle involvement and bilateral seminal vesicle invasion, no pelvic adenopathy. NM bone scan 2/24/22: focal increased radiotracer activity in L femoral, R 3rd rib and sacrum, c/f osseous metastatic disease.  CT chest 4/12/22:  liver metastases, R 3rd rib mets, additional possible sclerotic mets, few indeterminate sub-4mm lung nodules.  PSMA PET/CT 6/13/23- reveals focus of intensely increased PSMA expression within the LEFT L3 vertebral body (SUV 22.6) and adjacent to the LEFT L4/L5 facet joint.  Foci of bone tracer seen in L femur, R 3rd rib and sacral regions on prior bone scan do not demonstrate current abnormal PSMA activity.  No abnormal activity at prostatectomy site.  Suspected foci of osseous mets at L3 and left L4/L5 facet joint.  MRI lumbosacral spine 6/28/23- subtle MRI findings at L5-S1 corresponding to focal abnormal PET/CT, concerning for bony metastatic disease, L3 findings suspicious for metastatic disease  HCM:  - COVID vaccination: s/p booster January 2022 - Colonoscopy: done 2 years ago, normal   SH:  - Occupation: previously worked in photo finishes  - Living situation: lives in [place], his wife passed away, he is here with his sister and HCP today (Maylin)  - Smoking/etoh/illicits: he actively smokes 10 cigarettes / day x many years, denies etoh, denies illicits  - Exercise: minimally active   FH:  - No family history of cancer  [de-identified] : Sriram presents for follow up on 9/30/24 for metastatic prostate cancer.  Accompanied by sister. Lupron/eligard initiated 3/2022. Abiraterone started 4/1/22. Switched to xtandi 6/22/23.  DEXA 6/6/22- normal bone density  Initiated abraxane on 1/11/24- 3/14/24.  S/p Pluvicto 5/20/24, 7/1/24 PET/CT 7/26/24 - multiple FDG avid hepatic mets, large FDG avid mets in the left femoral head, neck and intertrochanteric region, increased risk of pathologic fracture, additional bony mets. [place] [facility] [place]urel placed in the left femur and was discharged to rehab (now home). Completed RT to L femur w/ Dr. 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 A/w 35% reduction in risk of disease progression or death compared with hormonal therapy. Most frequent AEs included HTN, anemia, diarrhea and fatigue.  Cabozantinib 40mg PO daily (1 hour before or 2 hours after meals) + atezolizumab 1200mg daily IV every 3 weeks.   At today's visit pt is happy to be starting new treatment plan.  He continues home PT/OT s/p left femur surgery - still with mild tenderness.  Overall feels better. He reports a good appetite, has gained a couple of lbs.  Using MMJ as per Dr. Alfredo with improvement in appetite, anxiety and sleep. BP remains elevated; reminded to schedule f/u visit with PCP or cardiology.  Labs today reviewed, discussed and c/f:  WBC 7.05, hgb 12.1, plt 197 PSA 90 on 8/15/24

## 2024-10-01 LAB
ALBUMIN SERPL ELPH-MCNC: 3.7 G/DL — SIGNIFICANT CHANGE UP (ref 3.3–5)
ALP SERPL-CCNC: 125 U/L — HIGH (ref 40–120)
ALT FLD-CCNC: 13 U/L — SIGNIFICANT CHANGE UP (ref 10–45)
ANION GAP SERPL CALC-SCNC: 18 MMOL/L — HIGH (ref 5–17)
AST SERPL-CCNC: 34 U/L — SIGNIFICANT CHANGE UP (ref 10–40)
BILIRUB SERPL-MCNC: 0.2 MG/DL — SIGNIFICANT CHANGE UP (ref 0.2–1.2)
BUN SERPL-MCNC: 28 MG/DL — HIGH (ref 7–23)
CALCIUM SERPL-MCNC: 9.2 MG/DL — SIGNIFICANT CHANGE UP (ref 8.4–10.5)
CHLORIDE SERPL-SCNC: 106 MMOL/L — SIGNIFICANT CHANGE UP (ref 96–108)
CO2 SERPL-SCNC: 22 MMOL/L — SIGNIFICANT CHANGE UP (ref 22–31)
CREAT SERPL-MCNC: 0.77 MG/DL — SIGNIFICANT CHANGE UP (ref 0.5–1.3)
EGFR: 95 ML/MIN/1.73M2 — SIGNIFICANT CHANGE UP
EGFR: 95 ML/MIN/1.73M2 — SIGNIFICANT CHANGE UP
GLUCOSE SERPL-MCNC: 86 MG/DL — SIGNIFICANT CHANGE UP (ref 70–99)
MAGNESIUM SERPL-MCNC: 2.1 MG/DL — SIGNIFICANT CHANGE UP (ref 1.6–2.6)
PHOSPHATE SERPL-MCNC: 3.2 MG/DL — SIGNIFICANT CHANGE UP (ref 2.5–4.5)
POTASSIUM SERPL-MCNC: 4.3 MMOL/L — SIGNIFICANT CHANGE UP (ref 3.5–5.3)
POTASSIUM SERPL-SCNC: 4.3 MMOL/L — SIGNIFICANT CHANGE UP (ref 3.5–5.3)
PROT SERPL-MCNC: 6.7 G/DL — SIGNIFICANT CHANGE UP (ref 6–8.3)
PSA FLD-MCNC: 151 NG/ML — HIGH (ref 0–4)
SODIUM SERPL-SCNC: 146 MMOL/L — HIGH (ref 135–145)
T4 FREE+ TSH PNL SERPL: 2.58 UIU/ML — SIGNIFICANT CHANGE UP (ref 0.27–4.2)
TESTOST FREE+TOTAL PANEL SERPL-MCNC: <2.5 NG/DL — LOW (ref 300–740)

## 2024-10-02 ENCOUNTER — NON-APPOINTMENT (OUTPATIENT)
Age: 74
End: 2024-10-02

## 2024-10-02 LAB
HBV CORE IGG+IGM SER QL: REACTIVE
HBV SURFACE AB SER QL: REACTIVE
HBV SURFACE AB SERPL IA-ACNC: 32.7 MIU/ML
HBV SURFACE AG SER QL: NONREACTIVE
HCV AB SER QL: REACTIVE
HCV S/CO RATIO: 2.65 S/CO

## 2024-10-21 ENCOUNTER — RESULT REVIEW (OUTPATIENT)
Age: 74
End: 2024-10-21

## 2024-10-21 ENCOUNTER — APPOINTMENT (OUTPATIENT)
Dept: INFUSION THERAPY | Facility: CANCER CENTER | Age: 74
End: 2024-10-21

## 2024-10-21 ENCOUNTER — APPOINTMENT (OUTPATIENT)
Dept: HEMATOLOGY ONCOLOGY | Facility: CLINIC | Age: 74
End: 2024-10-21
Payer: MEDICARE

## 2024-10-21 VITALS
SYSTOLIC BLOOD PRESSURE: 169 MMHG | HEART RATE: 64 BPM | RESPIRATION RATE: 16 BRPM | HEIGHT: 73 IN | BODY MASS INDEX: 19.94 KG/M2 | TEMPERATURE: 97.3 F | WEIGHT: 150.44 LBS | OXYGEN SATURATION: 98 % | DIASTOLIC BLOOD PRESSURE: 105 MMHG

## 2024-10-21 DIAGNOSIS — C61 MALIGNANT NEOPLASM OF PROSTATE: ICD-10-CM

## 2024-10-21 DIAGNOSIS — C79.51 SECONDARY MALIGNANT NEOPLASM OF BONE: ICD-10-CM

## 2024-10-21 LAB
ALBUMIN SERPL ELPH-MCNC: 3.5 G/DL — SIGNIFICANT CHANGE UP (ref 3.3–5)
ALP SERPL-CCNC: 117 U/L — SIGNIFICANT CHANGE UP (ref 40–120)
ALT FLD-CCNC: 22 U/L — SIGNIFICANT CHANGE UP (ref 10–45)
ANION GAP SERPL CALC-SCNC: 12 MMOL/L — SIGNIFICANT CHANGE UP (ref 5–17)
AST SERPL-CCNC: 32 U/L — SIGNIFICANT CHANGE UP (ref 10–40)
BASOPHILS # BLD AUTO: 0.03 K/UL — SIGNIFICANT CHANGE UP (ref 0–0.2)
BASOPHILS NFR BLD AUTO: 0.4 % — SIGNIFICANT CHANGE UP (ref 0–2)
BILIRUB SERPL-MCNC: 0.2 MG/DL — SIGNIFICANT CHANGE UP (ref 0.2–1.2)
BUN SERPL-MCNC: 27 MG/DL — HIGH (ref 7–23)
BURR CELLS BLD QL SMEAR: PRESENT — SIGNIFICANT CHANGE UP
CALCIUM SERPL-MCNC: 8.8 MG/DL — SIGNIFICANT CHANGE UP (ref 8.4–10.5)
CHLORIDE SERPL-SCNC: 101 MMOL/L — SIGNIFICANT CHANGE UP (ref 96–108)
CO2 SERPL-SCNC: 27 MMOL/L — SIGNIFICANT CHANGE UP (ref 22–31)
CREAT SERPL-MCNC: 0.89 MG/DL — SIGNIFICANT CHANGE UP (ref 0.5–1.3)
EGFR: 90 ML/MIN/1.73M2 — SIGNIFICANT CHANGE UP
EGFR: 90 ML/MIN/1.73M2 — SIGNIFICANT CHANGE UP
ELLIPTOCYTES BLD QL SMEAR: SLIGHT — SIGNIFICANT CHANGE UP
EOSINOPHIL # BLD AUTO: 1.03 K/UL — HIGH (ref 0–0.5)
EOSINOPHIL NFR BLD AUTO: 14.4 % — HIGH (ref 0–6)
GLUCOSE SERPL-MCNC: 123 MG/DL — HIGH (ref 70–99)
HCT VFR BLD CALC: 40 % — SIGNIFICANT CHANGE UP (ref 39–50)
HGB BLD-MCNC: 14.1 G/DL — SIGNIFICANT CHANGE UP (ref 13–17)
IMM GRANULOCYTES NFR BLD AUTO: 0.3 % — SIGNIFICANT CHANGE UP (ref 0–0.9)
LG PLATELETS BLD QL AUTO: SIGNIFICANT CHANGE UP
LYMPHOCYTES # BLD AUTO: 0.88 K/UL — LOW (ref 1–3.3)
LYMPHOCYTES # BLD AUTO: 12.3 % — LOW (ref 13–44)
MAGNESIUM SERPL-MCNC: 1.7 MG/DL — SIGNIFICANT CHANGE UP (ref 1.6–2.6)
MCHC RBC-ENTMCNC: 33.3 PG — SIGNIFICANT CHANGE UP (ref 27–34)
MCHC RBC-ENTMCNC: 35.3 GM/DL — SIGNIFICANT CHANGE UP (ref 32–36)
MCV RBC AUTO: 94.3 FL — SIGNIFICANT CHANGE UP (ref 80–100)
MONOCYTES # BLD AUTO: 0.39 K/UL — SIGNIFICANT CHANGE UP (ref 0–0.9)
MONOCYTES NFR BLD AUTO: 5.5 % — SIGNIFICANT CHANGE UP (ref 2–14)
NEUTROPHILS # BLD AUTO: 4.8 K/UL — SIGNIFICANT CHANGE UP (ref 1.8–7.4)
NEUTROPHILS NFR BLD AUTO: 67.1 % — SIGNIFICANT CHANGE UP (ref 43–77)
NRBC # BLD: 0 /100 WBCS — SIGNIFICANT CHANGE UP (ref 0–0)
NRBC BLD-RTO: 0 /100 WBCS — SIGNIFICANT CHANGE UP (ref 0–0)
PHOSPHATE SERPL-MCNC: 3 MG/DL — SIGNIFICANT CHANGE UP (ref 2.5–4.5)
PLAT MORPH BLD: NORMAL — SIGNIFICANT CHANGE UP
PLATELET # BLD AUTO: 115 K/UL — LOW (ref 150–400)
POIKILOCYTOSIS BLD QL AUTO: SLIGHT — SIGNIFICANT CHANGE UP
POTASSIUM SERPL-MCNC: 3.9 MMOL/L — SIGNIFICANT CHANGE UP (ref 3.5–5.3)
POTASSIUM SERPL-SCNC: 3.9 MMOL/L — SIGNIFICANT CHANGE UP (ref 3.5–5.3)
PROT SERPL-MCNC: 6 G/DL — SIGNIFICANT CHANGE UP (ref 6–8.3)
RBC # BLD: 4.24 M/UL — SIGNIFICANT CHANGE UP (ref 4.2–5.8)
RBC # FLD: 15 % — HIGH (ref 10.3–14.5)
RBC BLD AUTO: SIGNIFICANT CHANGE UP
SODIUM SERPL-SCNC: 140 MMOL/L — SIGNIFICANT CHANGE UP (ref 135–145)
WBC # BLD: 7.15 K/UL — SIGNIFICANT CHANGE UP (ref 3.8–10.5)
WBC # FLD AUTO: 7.15 K/UL — SIGNIFICANT CHANGE UP (ref 3.8–10.5)

## 2024-10-21 PROCEDURE — 99214 OFFICE O/P EST MOD 30 MIN: CPT

## 2024-10-21 PROCEDURE — G2211 COMPLEX E/M VISIT ADD ON: CPT

## 2024-10-22 LAB
PSA FLD-MCNC: 138 NG/ML — HIGH (ref 0–4)
T4 FREE SERPL-MCNC: 1.5 NG/DL — SIGNIFICANT CHANGE UP (ref 0.9–1.8)
T4 FREE+ TSH PNL SERPL: 4.72 UIU/ML — HIGH (ref 0.27–4.2)
TESTOST FREE+TOTAL PANEL SERPL-MCNC: <2.5 NG/DL — LOW (ref 300–740)

## 2024-11-04 ENCOUNTER — APPOINTMENT (OUTPATIENT)
Dept: NUCLEAR MEDICINE | Facility: HOSPITAL | Age: 74
End: 2024-11-04

## 2024-11-07 ENCOUNTER — APPOINTMENT (OUTPATIENT)
Dept: NUCLEAR MEDICINE | Facility: HOSPITAL | Age: 74
End: 2024-11-07

## 2024-11-11 ENCOUNTER — RESULT REVIEW (OUTPATIENT)
Age: 74
End: 2024-11-11

## 2024-11-11 ENCOUNTER — APPOINTMENT (OUTPATIENT)
Dept: HEMATOLOGY ONCOLOGY | Facility: CLINIC | Age: 74
End: 2024-11-11
Payer: MEDICARE

## 2024-11-11 ENCOUNTER — APPOINTMENT (OUTPATIENT)
Dept: INFUSION THERAPY | Facility: CANCER CENTER | Age: 74
End: 2024-11-11

## 2024-11-11 VITALS
HEIGHT: 73 IN | HEART RATE: 76 BPM | RESPIRATION RATE: 17 BRPM | WEIGHT: 146.38 LBS | OXYGEN SATURATION: 99 % | SYSTOLIC BLOOD PRESSURE: 155 MMHG | DIASTOLIC BLOOD PRESSURE: 83 MMHG | BODY MASS INDEX: 19.4 KG/M2 | TEMPERATURE: 97.3 F

## 2024-11-11 DIAGNOSIS — C79.51 SECONDARY MALIGNANT NEOPLASM OF BONE: ICD-10-CM

## 2024-11-11 DIAGNOSIS — D52.9 FOLATE DEFICIENCY ANEMIA, UNSPECIFIED: ICD-10-CM

## 2024-11-11 DIAGNOSIS — C61 MALIGNANT NEOPLASM OF PROSTATE: ICD-10-CM

## 2024-11-11 LAB
ALBUMIN SERPL ELPH-MCNC: 3.3 G/DL — SIGNIFICANT CHANGE UP (ref 3.3–5)
ALP SERPL-CCNC: 104 U/L — SIGNIFICANT CHANGE UP (ref 40–120)
ALT FLD-CCNC: 19 U/L — SIGNIFICANT CHANGE UP (ref 10–45)
ANION GAP SERPL CALC-SCNC: 10 MMOL/L — SIGNIFICANT CHANGE UP (ref 5–17)
AST SERPL-CCNC: 29 U/L — SIGNIFICANT CHANGE UP (ref 10–40)
BASOPHILS # BLD AUTO: 0.03 K/UL — SIGNIFICANT CHANGE UP (ref 0–0.2)
BASOPHILS NFR BLD AUTO: 0.4 % — SIGNIFICANT CHANGE UP (ref 0–2)
BILIRUB SERPL-MCNC: 0.3 MG/DL — SIGNIFICANT CHANGE UP (ref 0.2–1.2)
BUN SERPL-MCNC: 28 MG/DL — HIGH (ref 7–23)
CALCIUM SERPL-MCNC: 8.3 MG/DL — LOW (ref 8.4–10.5)
CHLORIDE SERPL-SCNC: 102 MMOL/L — SIGNIFICANT CHANGE UP (ref 96–108)
CO2 SERPL-SCNC: 26 MMOL/L — SIGNIFICANT CHANGE UP (ref 22–31)
CREAT SERPL-MCNC: 0.97 MG/DL — SIGNIFICANT CHANGE UP (ref 0.5–1.3)
EGFR: 82 ML/MIN/1.73M2 — SIGNIFICANT CHANGE UP
EGFR: 82 ML/MIN/1.73M2 — SIGNIFICANT CHANGE UP
EOSINOPHIL # BLD AUTO: 0.73 K/UL — HIGH (ref 0–0.5)
EOSINOPHIL NFR BLD AUTO: 10 % — HIGH (ref 0–6)
FOLATE SERPL-MCNC: >20 NG/ML — SIGNIFICANT CHANGE UP
GLUCOSE SERPL-MCNC: 109 MG/DL — HIGH (ref 70–99)
HCT VFR BLD CALC: 40.2 % — SIGNIFICANT CHANGE UP (ref 39–50)
HGB BLD-MCNC: 13.7 G/DL — SIGNIFICANT CHANGE UP (ref 13–17)
IMM GRANULOCYTES NFR BLD AUTO: 0.3 % — SIGNIFICANT CHANGE UP (ref 0–0.9)
LYMPHOCYTES # BLD AUTO: 0.82 K/UL — LOW (ref 1–3.3)
LYMPHOCYTES # BLD AUTO: 11.3 % — LOW (ref 13–44)
MAGNESIUM SERPL-MCNC: 1.7 MG/DL — SIGNIFICANT CHANGE UP (ref 1.6–2.6)
MCHC RBC-ENTMCNC: 32.7 PG — SIGNIFICANT CHANGE UP (ref 27–34)
MCHC RBC-ENTMCNC: 34.1 G/DL — SIGNIFICANT CHANGE UP (ref 32–36)
MCV RBC AUTO: 95.9 FL — SIGNIFICANT CHANGE UP (ref 80–100)
MONOCYTES # BLD AUTO: 0.41 K/UL — SIGNIFICANT CHANGE UP (ref 0–0.9)
MONOCYTES NFR BLD AUTO: 5.6 % — SIGNIFICANT CHANGE UP (ref 2–14)
NEUTROPHILS # BLD AUTO: 5.26 K/UL — SIGNIFICANT CHANGE UP (ref 1.8–7.4)
NEUTROPHILS NFR BLD AUTO: 72.4 % — SIGNIFICANT CHANGE UP (ref 43–77)
NRBC # BLD: 0 /100 WBCS — SIGNIFICANT CHANGE UP (ref 0–0)
NRBC BLD-RTO: 0 /100 WBCS — SIGNIFICANT CHANGE UP (ref 0–0)
PHOSPHATE SERPL-MCNC: 2.5 MG/DL — SIGNIFICANT CHANGE UP (ref 2.5–4.5)
PLATELET # BLD AUTO: 99 K/UL — LOW (ref 150–400)
POTASSIUM SERPL-MCNC: 3.9 MMOL/L — SIGNIFICANT CHANGE UP (ref 3.5–5.3)
POTASSIUM SERPL-SCNC: 3.9 MMOL/L — SIGNIFICANT CHANGE UP (ref 3.5–5.3)
PROT SERPL-MCNC: 5.7 G/DL — LOW (ref 6–8.3)
PSA FLD-MCNC: 136 NG/ML — HIGH (ref 0–4)
RBC # BLD: 4.19 M/UL — LOW (ref 4.2–5.8)
RBC # FLD: 16 % — HIGH (ref 10.3–14.5)
SODIUM SERPL-SCNC: 138 MMOL/L — SIGNIFICANT CHANGE UP (ref 135–145)
T4 FREE+ TSH PNL SERPL: 7.22 UIU/ML — HIGH (ref 0.27–4.2)
TESTOST FREE+TOTAL PANEL SERPL-MCNC: 6.6 NG/DL — LOW (ref 300–740)
VIT B12 SERPL-MCNC: >2000 PG/ML — HIGH (ref 232–1245)
WBC # BLD: 7.27 K/UL — SIGNIFICANT CHANGE UP (ref 3.8–10.5)
WBC # FLD AUTO: 7.27 K/UL — SIGNIFICANT CHANGE UP (ref 3.8–10.5)

## 2024-11-11 PROCEDURE — 84449 ASSAY OF TRANSCORTIN: CPT

## 2024-11-11 PROCEDURE — 99214 OFFICE O/P EST MOD 30 MIN: CPT

## 2024-11-11 PROCEDURE — 84403 ASSAY OF TOTAL TESTOSTERONE: CPT

## 2024-11-11 PROCEDURE — 84100 ASSAY OF PHOSPHORUS: CPT

## 2024-11-11 PROCEDURE — 84443 ASSAY THYROID STIM HORMONE: CPT

## 2024-11-11 PROCEDURE — 80053 COMPREHEN METABOLIC PANEL: CPT

## 2024-11-11 PROCEDURE — G2211 COMPLEX E/M VISIT ADD ON: CPT

## 2024-11-11 PROCEDURE — 82607 VITAMIN B-12: CPT

## 2024-11-11 PROCEDURE — 82746 ASSAY OF FOLIC ACID SERUM: CPT

## 2024-11-11 PROCEDURE — G0103: CPT

## 2024-11-11 PROCEDURE — 84439 ASSAY OF FREE THYROXINE: CPT

## 2024-11-11 PROCEDURE — 82533 TOTAL CORTISOL: CPT

## 2024-11-11 PROCEDURE — 85027 COMPLETE CBC AUTOMATED: CPT

## 2024-11-11 PROCEDURE — 96402 CHEMO HORMON ANTINEOPL SQ/IM: CPT

## 2024-11-11 PROCEDURE — 96413 CHEMO IV INFUSION 1 HR: CPT

## 2024-11-11 PROCEDURE — 83735 ASSAY OF MAGNESIUM: CPT

## 2024-11-11 RX ORDER — CABOZANTINIB 20 MG/1
20 TABLET ORAL DAILY
Qty: 30 | Refills: 0 | Status: ACTIVE | COMMUNITY
Start: 2024-11-11 | End: 1900-01-01

## 2024-11-12 ENCOUNTER — NON-APPOINTMENT (OUTPATIENT)
Age: 74
End: 2024-11-12

## 2024-11-12 LAB — T4 FREE SERPL-MCNC: 1.4 NG/DL — SIGNIFICANT CHANGE UP (ref 0.9–1.8)

## 2024-11-21 LAB
CORTICOSTEROID BINDING GLOBULIN RESULT: 2.3 MG/DL — SIGNIFICANT CHANGE UP
CORTIS F/TOTAL MFR SERPL: 12 % — SIGNIFICANT CHANGE UP
CORTIS SERPL-MCNC: 15 UG/DL — SIGNIFICANT CHANGE UP
CORTISOL, FREE RESULT: 1.8 UG/DL — SIGNIFICANT CHANGE UP

## 2024-11-22 DIAGNOSIS — K59.00 CONSTIPATION, UNSPECIFIED: ICD-10-CM

## 2024-11-22 RX ORDER — DOCUSATE SODIUM 50MG AND SENNOSIDES 8.6MG 8.6; 5 MG/1; MG/1
8.6-5 TABLET, FILM COATED ORAL
Qty: 120 | Refills: 1 | Status: ACTIVE | COMMUNITY
Start: 2024-11-22 | End: 1900-01-01

## 2024-11-26 ENCOUNTER — APPOINTMENT (OUTPATIENT)
Dept: CARDIOLOGY | Facility: CLINIC | Age: 74
End: 2024-11-26

## 2024-11-26 ENCOUNTER — OUTPATIENT (OUTPATIENT)
Dept: OUTPATIENT SERVICES | Facility: HOSPITAL | Age: 74
LOS: 1 days | End: 2024-11-26
Payer: MEDICARE

## 2024-11-26 DIAGNOSIS — C61 MALIGNANT NEOPLASM OF PROSTATE: ICD-10-CM

## 2024-11-26 DIAGNOSIS — R11.2 NAUSEA WITH VOMITING, UNSPECIFIED: ICD-10-CM

## 2024-11-26 DIAGNOSIS — Z51.11 ENCOUNTER FOR ANTINEOPLASTIC CHEMOTHERAPY: ICD-10-CM

## 2024-12-02 ENCOUNTER — RESULT REVIEW (OUTPATIENT)
Age: 74
End: 2024-12-02

## 2024-12-02 ENCOUNTER — APPOINTMENT (OUTPATIENT)
Dept: INFUSION THERAPY | Facility: CANCER CENTER | Age: 74
End: 2024-12-02

## 2024-12-02 ENCOUNTER — APPOINTMENT (OUTPATIENT)
Dept: HEMATOLOGY ONCOLOGY | Facility: CLINIC | Age: 74
End: 2024-12-02
Payer: MEDICARE

## 2024-12-02 DIAGNOSIS — C79.51 SECONDARY MALIGNANT NEOPLASM OF BONE: ICD-10-CM

## 2024-12-02 LAB
BASOPHILS # BLD AUTO: 0.03 K/UL — SIGNIFICANT CHANGE UP (ref 0–0.2)
BASOPHILS NFR BLD AUTO: 0.5 % — SIGNIFICANT CHANGE UP (ref 0–2)
EOSINOPHIL # BLD AUTO: 0.47 K/UL — SIGNIFICANT CHANGE UP (ref 0–0.5)
EOSINOPHIL NFR BLD AUTO: 7.8 % — HIGH (ref 0–6)
HCT VFR BLD CALC: 37.5 % — LOW (ref 39–50)
HGB BLD-MCNC: 13 G/DL — SIGNIFICANT CHANGE UP (ref 13–17)
IMM GRANULOCYTES NFR BLD AUTO: 0.2 % — SIGNIFICANT CHANGE UP (ref 0–0.9)
LYMPHOCYTES # BLD AUTO: 0.66 K/UL — LOW (ref 1–3.3)
LYMPHOCYTES # BLD AUTO: 11 % — LOW (ref 13–44)
MCHC RBC-ENTMCNC: 34 PG — SIGNIFICANT CHANGE UP (ref 27–34)
MCHC RBC-ENTMCNC: 34.7 G/DL — SIGNIFICANT CHANGE UP (ref 32–36)
MCV RBC AUTO: 98.2 FL — SIGNIFICANT CHANGE UP (ref 80–100)
MONOCYTES # BLD AUTO: 0.34 K/UL — SIGNIFICANT CHANGE UP (ref 0–0.9)
MONOCYTES NFR BLD AUTO: 5.7 % — SIGNIFICANT CHANGE UP (ref 2–14)
NEUTROPHILS # BLD AUTO: 4.49 K/UL — SIGNIFICANT CHANGE UP (ref 1.8–7.4)
NEUTROPHILS NFR BLD AUTO: 74.8 % — SIGNIFICANT CHANGE UP (ref 43–77)
NRBC # BLD: 0 /100 WBCS — SIGNIFICANT CHANGE UP (ref 0–0)
NRBC BLD-RTO: 0 /100 WBCS — SIGNIFICANT CHANGE UP (ref 0–0)
PLATELET # BLD AUTO: 137 K/UL — LOW (ref 150–400)
RBC # BLD: 3.82 M/UL — LOW (ref 4.2–5.8)
RBC # FLD: 16.3 % — HIGH (ref 10.3–14.5)
WBC # BLD: 6 K/UL — SIGNIFICANT CHANGE UP (ref 3.8–10.5)
WBC # FLD AUTO: 6 K/UL — SIGNIFICANT CHANGE UP (ref 3.8–10.5)

## 2024-12-02 PROCEDURE — 99215 OFFICE O/P EST HI 40 MIN: CPT

## 2024-12-02 RX ORDER — ONDANSETRON 8 MG/1
8 TABLET ORAL EVERY 8 HOURS
Qty: 60 | Refills: 2 | Status: ACTIVE | COMMUNITY
Start: 2024-12-02 | End: 1900-01-01

## 2024-12-03 DIAGNOSIS — M79.605 PAIN IN LEFT LEG: ICD-10-CM

## 2024-12-03 LAB
ALBUMIN SERPL ELPH-MCNC: 3.1 G/DL — LOW (ref 3.3–5)
ALP SERPL-CCNC: 96 U/L — SIGNIFICANT CHANGE UP (ref 40–120)
ALT FLD-CCNC: 14 U/L — SIGNIFICANT CHANGE UP (ref 10–45)
ANION GAP SERPL CALC-SCNC: 12 MMOL/L — SIGNIFICANT CHANGE UP (ref 5–17)
AST SERPL-CCNC: 29 U/L — SIGNIFICANT CHANGE UP (ref 10–40)
BILIRUB SERPL-MCNC: 0.2 MG/DL — SIGNIFICANT CHANGE UP (ref 0.2–1.2)
BUN SERPL-MCNC: 22 MG/DL — SIGNIFICANT CHANGE UP (ref 7–23)
CALCIUM SERPL-MCNC: 8.3 MG/DL — LOW (ref 8.4–10.5)
CHLORIDE SERPL-SCNC: 106 MMOL/L — SIGNIFICANT CHANGE UP (ref 96–108)
CO2 SERPL-SCNC: 24 MMOL/L — SIGNIFICANT CHANGE UP (ref 22–31)
CREAT SERPL-MCNC: 0.83 MG/DL — SIGNIFICANT CHANGE UP (ref 0.5–1.3)
EGFR: 92 ML/MIN/1.73M2 — SIGNIFICANT CHANGE UP
EGFR: 92 ML/MIN/1.73M2 — SIGNIFICANT CHANGE UP
GLUCOSE SERPL-MCNC: 132 MG/DL — HIGH (ref 70–99)
MAGNESIUM SERPL-MCNC: 1.7 MG/DL — SIGNIFICANT CHANGE UP (ref 1.6–2.6)
PHOSPHATE SERPL-MCNC: 2.2 MG/DL — LOW (ref 2.5–4.5)
POTASSIUM SERPL-MCNC: 3.7 MMOL/L — SIGNIFICANT CHANGE UP (ref 3.5–5.3)
POTASSIUM SERPL-SCNC: 3.7 MMOL/L — SIGNIFICANT CHANGE UP (ref 3.5–5.3)
PROT SERPL-MCNC: 5.7 G/DL — LOW (ref 6–8.3)
PSA FLD-MCNC: 178 NG/ML — HIGH (ref 0–4)
SODIUM SERPL-SCNC: 142 MMOL/L — SIGNIFICANT CHANGE UP (ref 135–145)
T4 FREE SERPL-MCNC: 1.4 NG/DL — SIGNIFICANT CHANGE UP (ref 0.9–1.8)
T4 FREE+ TSH PNL SERPL: 5.22 UIU/ML — HIGH (ref 0.27–4.2)
TESTOST FREE+TOTAL PANEL SERPL-MCNC: 3.1 NG/DL — LOW (ref 300–740)

## 2024-12-05 ENCOUNTER — APPOINTMENT (OUTPATIENT)
Dept: NUCLEAR MEDICINE | Facility: CLINIC | Age: 74
End: 2024-12-05

## 2024-12-09 ENCOUNTER — APPOINTMENT (OUTPATIENT)
Dept: NUCLEAR MEDICINE | Facility: CLINIC | Age: 74
End: 2024-12-09
Payer: MEDICARE

## 2024-12-09 PROCEDURE — A9595: CPT

## 2024-12-09 PROCEDURE — 78816 PET IMAGE W/CT FULL BODY: CPT | Mod: PS

## 2024-12-11 ENCOUNTER — APPOINTMENT (OUTPATIENT)
Dept: RADIOLOGY | Facility: CLINIC | Age: 74
End: 2024-12-11
Payer: MEDICARE

## 2024-12-11 ENCOUNTER — APPOINTMENT (OUTPATIENT)
Dept: ULTRASOUND IMAGING | Facility: CLINIC | Age: 74
End: 2024-12-11
Payer: MEDICARE

## 2024-12-11 PROCEDURE — 93971 EXTREMITY STUDY: CPT | Mod: LT

## 2024-12-11 PROCEDURE — 73590 X-RAY EXAM OF LOWER LEG: CPT | Mod: LT

## 2024-12-12 DIAGNOSIS — C61 MALIGNANT NEOPLASM OF PROSTATE: ICD-10-CM

## 2024-12-13 ENCOUNTER — RESULT REVIEW (OUTPATIENT)
Age: 74
End: 2024-12-13

## 2024-12-13 ENCOUNTER — APPOINTMENT (OUTPATIENT)
Dept: HEMATOLOGY ONCOLOGY | Facility: CLINIC | Age: 74
End: 2024-12-13

## 2024-12-13 LAB
BASOPHILS # BLD AUTO: 0.03 K/UL — SIGNIFICANT CHANGE UP (ref 0–0.2)
BASOPHILS NFR BLD AUTO: 0.5 % — SIGNIFICANT CHANGE UP (ref 0–2)
EOSINOPHIL # BLD AUTO: 0.4 K/UL — SIGNIFICANT CHANGE UP (ref 0–0.5)
EOSINOPHIL NFR BLD AUTO: 6.8 % — HIGH (ref 0–6)
HCT VFR BLD CALC: 39.6 % — SIGNIFICANT CHANGE UP (ref 39–50)
HGB BLD-MCNC: 13.6 G/DL — SIGNIFICANT CHANGE UP (ref 13–17)
IMM GRANULOCYTES NFR BLD AUTO: 0.2 % — SIGNIFICANT CHANGE UP (ref 0–0.9)
LYMPHOCYTES # BLD AUTO: 0.54 K/UL — LOW (ref 1–3.3)
LYMPHOCYTES # BLD AUTO: 9.2 % — LOW (ref 13–44)
MCHC RBC-ENTMCNC: 33.7 PG — SIGNIFICANT CHANGE UP (ref 27–34)
MCHC RBC-ENTMCNC: 34.3 G/DL — SIGNIFICANT CHANGE UP (ref 32–36)
MCV RBC AUTO: 98 FL — SIGNIFICANT CHANGE UP (ref 80–100)
MONOCYTES # BLD AUTO: 0.37 K/UL — SIGNIFICANT CHANGE UP (ref 0–0.9)
MONOCYTES NFR BLD AUTO: 6.3 % — SIGNIFICANT CHANGE UP (ref 2–14)
NEUTROPHILS # BLD AUTO: 4.55 K/UL — SIGNIFICANT CHANGE UP (ref 1.8–7.4)
NEUTROPHILS NFR BLD AUTO: 77 % — SIGNIFICANT CHANGE UP (ref 43–77)
NRBC # BLD: 0 /100 WBCS — SIGNIFICANT CHANGE UP (ref 0–0)
NRBC BLD-RTO: 0 /100 WBCS — SIGNIFICANT CHANGE UP (ref 0–0)
PLATELET # BLD AUTO: 163 K/UL — SIGNIFICANT CHANGE UP (ref 150–400)
RBC # BLD: 4.04 M/UL — LOW (ref 4.2–5.8)
RBC # FLD: 15.9 % — HIGH (ref 10.3–14.5)
WBC # BLD: 5.9 K/UL — SIGNIFICANT CHANGE UP (ref 3.8–10.5)
WBC # FLD AUTO: 5.9 K/UL — SIGNIFICANT CHANGE UP (ref 3.8–10.5)

## 2024-12-16 ENCOUNTER — APPOINTMENT (OUTPATIENT)
Dept: NUCLEAR MEDICINE | Facility: HOSPITAL | Age: 74
End: 2024-12-16

## 2024-12-16 LAB
ALBUMIN SERPL ELPH-MCNC: 3.8 G/DL
ALP BLD-CCNC: 110 U/L
ALT SERPL-CCNC: 13 U/L
ANION GAP SERPL CALC-SCNC: 12 MMOL/L
AST SERPL-CCNC: 28 U/L
BILIRUB SERPL-MCNC: 0.4 MG/DL
BUN SERPL-MCNC: 28 MG/DL
CALCIUM SERPL-MCNC: 8.8 MG/DL
CHLORIDE SERPL-SCNC: 100 MMOL/L
CO2 SERPL-SCNC: 27 MMOL/L
CREAT SERPL-MCNC: 0.95 MG/DL
EGFR: 85 ML/MIN/1.73M2
GLUCOSE SERPL-MCNC: 140 MG/DL
POTASSIUM SERPL-SCNC: 4.1 MMOL/L
PROT SERPL-MCNC: 6.1 G/DL
PSA FREE FLD-MCNC: 12 %
PSA FREE SERPL-MCNC: 28.5 NG/ML
PSA SERPL-MCNC: 235 NG/ML
SODIUM SERPL-SCNC: 139 MMOL/L

## 2024-12-19 ENCOUNTER — APPOINTMENT (OUTPATIENT)
Dept: NUCLEAR MEDICINE | Facility: HOSPITAL | Age: 74
End: 2024-12-19

## 2024-12-19 ENCOUNTER — APPOINTMENT (OUTPATIENT)
Dept: CARDIOLOGY | Facility: CLINIC | Age: 74
End: 2024-12-19

## 2024-12-23 ENCOUNTER — RESULT REVIEW (OUTPATIENT)
Age: 74
End: 2024-12-23

## 2024-12-23 ENCOUNTER — APPOINTMENT (OUTPATIENT)
Dept: INFUSION THERAPY | Facility: CANCER CENTER | Age: 74
End: 2024-12-23

## 2024-12-23 LAB
ALBUMIN SERPL ELPH-MCNC: 3.3 G/DL — SIGNIFICANT CHANGE UP (ref 3.3–5)
ALP SERPL-CCNC: 105 U/L — SIGNIFICANT CHANGE UP (ref 40–120)
ALT FLD-CCNC: 13 U/L — SIGNIFICANT CHANGE UP (ref 10–45)
ANION GAP SERPL CALC-SCNC: 12 MMOL/L — SIGNIFICANT CHANGE UP (ref 5–17)
AST SERPL-CCNC: 26 U/L — SIGNIFICANT CHANGE UP (ref 10–40)
BASOPHILS # BLD AUTO: 0.02 K/UL — SIGNIFICANT CHANGE UP (ref 0–0.2)
BASOPHILS NFR BLD AUTO: 0.3 % — SIGNIFICANT CHANGE UP (ref 0–2)
BILIRUB SERPL-MCNC: 0.3 MG/DL — SIGNIFICANT CHANGE UP (ref 0.2–1.2)
BUN SERPL-MCNC: 24 MG/DL — HIGH (ref 7–23)
CALCIUM SERPL-MCNC: 8.5 MG/DL — SIGNIFICANT CHANGE UP (ref 8.4–10.5)
CHLORIDE SERPL-SCNC: 101 MMOL/L — SIGNIFICANT CHANGE UP (ref 96–108)
CO2 SERPL-SCNC: 24 MMOL/L — SIGNIFICANT CHANGE UP (ref 22–31)
CREAT SERPL-MCNC: 0.84 MG/DL — SIGNIFICANT CHANGE UP (ref 0.5–1.3)
EGFR: 92 ML/MIN/1.73M2 — SIGNIFICANT CHANGE UP
EGFR: 92 ML/MIN/1.73M2 — SIGNIFICANT CHANGE UP
EOSINOPHIL # BLD AUTO: 0.45 K/UL — SIGNIFICANT CHANGE UP (ref 0–0.5)
EOSINOPHIL NFR BLD AUTO: 6.1 % — HIGH (ref 0–6)
GLUCOSE SERPL-MCNC: 109 MG/DL — HIGH (ref 70–99)
HCT VFR BLD CALC: 38 % — LOW (ref 39–50)
HGB BLD-MCNC: 12.8 G/DL — LOW (ref 13–17)
IMM GRANULOCYTES NFR BLD AUTO: 0.4 % — SIGNIFICANT CHANGE UP (ref 0–0.9)
LYMPHOCYTES # BLD AUTO: 0.72 K/UL — LOW (ref 1–3.3)
LYMPHOCYTES # BLD AUTO: 9.7 % — LOW (ref 13–44)
MAGNESIUM SERPL-MCNC: 1.7 MG/DL — SIGNIFICANT CHANGE UP (ref 1.6–2.6)
MCHC RBC-ENTMCNC: 32.9 PG — SIGNIFICANT CHANGE UP (ref 27–34)
MCHC RBC-ENTMCNC: 33.7 G/DL — SIGNIFICANT CHANGE UP (ref 32–36)
MCV RBC AUTO: 97.7 FL — SIGNIFICANT CHANGE UP (ref 80–100)
MONOCYTES # BLD AUTO: 0.46 K/UL — SIGNIFICANT CHANGE UP (ref 0–0.9)
MONOCYTES NFR BLD AUTO: 6.2 % — SIGNIFICANT CHANGE UP (ref 2–14)
NEUTROPHILS # BLD AUTO: 5.72 K/UL — SIGNIFICANT CHANGE UP (ref 1.8–7.4)
NEUTROPHILS NFR BLD AUTO: 77.3 % — HIGH (ref 43–77)
NRBC # BLD: 0 /100 WBCS — SIGNIFICANT CHANGE UP (ref 0–0)
NRBC BLD-RTO: 0 /100 WBCS — SIGNIFICANT CHANGE UP (ref 0–0)
PHOSPHATE SERPL-MCNC: 2.4 MG/DL — LOW (ref 2.5–4.5)
PLATELET # BLD AUTO: 181 K/UL — SIGNIFICANT CHANGE UP (ref 150–400)
POTASSIUM SERPL-MCNC: 3.8 MMOL/L — SIGNIFICANT CHANGE UP (ref 3.5–5.3)
POTASSIUM SERPL-SCNC: 3.8 MMOL/L — SIGNIFICANT CHANGE UP (ref 3.5–5.3)
PROT SERPL-MCNC: 6.1 G/DL — SIGNIFICANT CHANGE UP (ref 6–8.3)
RBC # BLD: 3.89 M/UL — LOW (ref 4.2–5.8)
RBC # FLD: 15.9 % — HIGH (ref 10.3–14.5)
SODIUM SERPL-SCNC: 137 MMOL/L — SIGNIFICANT CHANGE UP (ref 135–145)
WBC # BLD: 7.4 K/UL — SIGNIFICANT CHANGE UP (ref 3.8–10.5)
WBC # FLD AUTO: 7.4 K/UL — SIGNIFICANT CHANGE UP (ref 3.8–10.5)

## 2024-12-23 PROCEDURE — 84100 ASSAY OF PHOSPHORUS: CPT

## 2024-12-23 PROCEDURE — 36415 COLL VENOUS BLD VENIPUNCTURE: CPT

## 2024-12-23 PROCEDURE — 84403 ASSAY OF TOTAL TESTOSTERONE: CPT

## 2024-12-23 PROCEDURE — G0103: CPT

## 2024-12-23 PROCEDURE — 83735 ASSAY OF MAGNESIUM: CPT

## 2024-12-23 PROCEDURE — 85027 COMPLETE CBC AUTOMATED: CPT

## 2024-12-23 PROCEDURE — 84443 ASSAY THYROID STIM HORMONE: CPT

## 2024-12-23 PROCEDURE — 84439 ASSAY OF FREE THYROXINE: CPT

## 2024-12-23 PROCEDURE — 80053 COMPREHEN METABOLIC PANEL: CPT

## 2024-12-23 PROCEDURE — 96413 CHEMO IV INFUSION 1 HR: CPT

## 2024-12-24 LAB
PSA FLD-MCNC: 254 NG/ML — HIGH (ref 0–4)
T4 FREE SERPL-MCNC: 1.4 NG/DL — SIGNIFICANT CHANGE UP (ref 0.9–1.7)
T4 FREE+ TSH PNL SERPL: 5.92 UIU/ML — HIGH (ref 0.27–4.2)
TESTOST FREE+TOTAL PANEL SERPL-MCNC: 4.9 NG/DL — LOW (ref 300–740)

## 2025-01-06 DIAGNOSIS — C61 MALIGNANT NEOPLASM OF PROSTATE: ICD-10-CM

## 2025-01-06 RX ORDER — OXYCODONE 5 MG/1
5 TABLET ORAL
Qty: 30 | Refills: 0 | Status: ACTIVE | COMMUNITY
Start: 2025-01-06 | End: 1900-01-01

## 2025-01-09 ENCOUNTER — NON-APPOINTMENT (OUTPATIENT)
Age: 75
End: 2025-01-09

## 2025-01-14 ENCOUNTER — NON-APPOINTMENT (OUTPATIENT)
Age: 75
End: 2025-01-14

## 2025-01-16 ENCOUNTER — APPOINTMENT (OUTPATIENT)
Dept: HEMATOLOGY ONCOLOGY | Facility: CLINIC | Age: 75
End: 2025-01-16

## 2025-01-16 ENCOUNTER — APPOINTMENT (OUTPATIENT)
Dept: INFUSION THERAPY | Facility: CANCER CENTER | Age: 75
End: 2025-01-16